# Patient Record
Sex: MALE | Race: WHITE | Employment: OTHER | URBAN - METROPOLITAN AREA
[De-identification: names, ages, dates, MRNs, and addresses within clinical notes are randomized per-mention and may not be internally consistent; named-entity substitution may affect disease eponyms.]

---

## 2020-02-14 ENCOUNTER — HOSPITAL ENCOUNTER (OUTPATIENT)
Dept: LAB | Age: 80
Discharge: HOME | End: 2020-02-14
Payer: MEDICARE

## 2020-02-14 ENCOUNTER — OFFICE VISIT (OUTPATIENT)
Dept: FAMILY MEDICINE CLINIC | Age: 80
End: 2020-02-14

## 2020-02-14 VITALS
HEIGHT: 70 IN | TEMPERATURE: 98.7 F | HEART RATE: 76 BPM | WEIGHT: 195 LBS | SYSTOLIC BLOOD PRESSURE: 112 MMHG | RESPIRATION RATE: 16 BRPM | OXYGEN SATURATION: 96 % | DIASTOLIC BLOOD PRESSURE: 70 MMHG | BODY MASS INDEX: 27.92 KG/M2

## 2020-02-14 DIAGNOSIS — D33.3 ACOUSTIC NEUROMA (HCC): Primary | ICD-10-CM

## 2020-02-14 DIAGNOSIS — Z99.89 OSA ON CPAP: ICD-10-CM

## 2020-02-14 DIAGNOSIS — E78.00 HYPERCHOLESTEREMIA: ICD-10-CM

## 2020-02-14 DIAGNOSIS — G47.33 OSA ON CPAP: ICD-10-CM

## 2020-02-14 DIAGNOSIS — R06.09 CHRONIC DYSPNEA: ICD-10-CM

## 2020-02-14 DIAGNOSIS — R01.1 HEART MURMUR: ICD-10-CM

## 2020-02-14 DIAGNOSIS — I82.5Z1 CHRONIC DEEP VEIN THROMBOSIS (DVT) OF DISTAL VEIN OF RIGHT LOWER EXTREMITY (HCC): ICD-10-CM

## 2020-02-14 PROBLEM — F41.9 ANXIETY: Status: ACTIVE | Noted: 2020-02-14

## 2020-02-14 PROBLEM — I82.509 CHRONIC DEEP VEIN THROMBOSIS (DVT) OF LOWER EXTREMITY (HCC): Status: ACTIVE | Noted: 2020-02-14

## 2020-02-14 PROBLEM — I47.9 PAROXYSMAL TACHYCARDIA, UNSPECIFIED (HCC): Status: ACTIVE | Noted: 2020-02-14

## 2020-02-14 PROCEDURE — 85610 PROTHROMBIN TIME: CPT

## 2020-02-14 PROCEDURE — 80053 COMPREHEN METABOLIC PANEL: CPT

## 2020-02-14 PROCEDURE — 85025 COMPLETE CBC W/AUTO DIFF WBC: CPT

## 2020-02-14 RX ORDER — FOLIC ACID 1 MG/1
TABLET ORAL DAILY
COMMUNITY

## 2020-02-14 RX ORDER — ATORVASTATIN CALCIUM 20 MG/1
TABLET, FILM COATED ORAL DAILY
COMMUNITY
End: 2020-06-17

## 2020-02-14 RX ORDER — ASCORBIC ACID 500 MG
TABLET ORAL
COMMUNITY

## 2020-02-14 RX ORDER — LANOLIN ALCOHOL/MO/W.PET/CERES
CREAM (GRAM) TOPICAL DAILY
COMMUNITY
End: 2021-06-15

## 2020-02-14 RX ORDER — ASPIRIN 81 MG/1
TABLET ORAL DAILY
COMMUNITY
End: 2021-01-19

## 2020-02-14 RX ORDER — ACETAMINOPHEN 500 MG
2000 TABLET ORAL DAILY
COMMUNITY

## 2020-02-14 RX ORDER — SOTALOL HYDROCHLORIDE 80 MG/1
80 TABLET ORAL 2 TIMES DAILY
COMMUNITY
End: 2020-03-04 | Stop reason: SDUPTHER

## 2020-02-14 RX ORDER — WARFARIN 1 MG/1
1 TABLET ORAL DAILY
COMMUNITY
End: 2020-05-14 | Stop reason: DRUGHIGH

## 2020-02-14 RX ORDER — WARFARIN 2.5 MG/1
2.5 TABLET ORAL DAILY
COMMUNITY
End: 2020-02-27 | Stop reason: SDUPTHER

## 2020-02-14 RX ORDER — IRON 18 MG
TABLET ORAL
COMMUNITY
End: 2021-02-09

## 2020-02-14 RX ORDER — MENTHOL
1000 GEL (GRAM) TOPICAL DAILY
COMMUNITY
End: 2021-02-09

## 2020-02-14 RX ORDER — ZINC GLUCONATE 10 MG
LOZENGE ORAL
COMMUNITY

## 2020-02-14 RX ORDER — PAROXETINE HYDROCHLORIDE 20 MG/1
TABLET, FILM COATED ORAL EVERY EVENING
COMMUNITY
End: 2020-05-10 | Stop reason: SDUPTHER

## 2020-02-14 NOTE — PROGRESS NOTES
Chief Complaint   Patient presents with   [facility] Patient

## 2020-02-14 NOTE — PROGRESS NOTES
Agueda Eli  70 y.o. male  1940  [address] [facility]  [id number]     [facility] PRACTICE       Encounter Date: 2/14/2020           New Patient Visit Note: Fermin Goff MD      Reason for Appointment:  Chief Complaint   Patient presents with   [facility] Establish Care     New Patient        History of Present Illness:  History provided by patient    Agueda Eli is a  70y.o. year old male who presents to clinic today for an new patient visit. The patient  has a past medical history of Acoustic neuroma ([address] (2/18/2020), Chronic deep vein thrombosis (DVT) of lower extremity ([address] (2/14/2020), Hypercholesteremia (2/14/2020), ISABELLA on CPAP (2/18/2020), and Paroxysmal tachycardia, unspecified ([facility] (2/14/2020). .  The patient reports that he has been feeling well recently, and he denies any recent illnesses or hospitalizations. The patient's acute and chronic medical conditions that were addressed today are discussed in greater detail below.         Acoustic neuroma [facility])     Duration: diagnosed around age 42  Monitoring: patient reports getting imaging every 2 years  Specialist: has ENT in [facility], but would like to transfer care to [facility], [address] [place] [address] ISABELLA on CPAP:   -reports good compliance and would like to establish with sleep medicine in [place]      Chronic dyspnea     Duration: patient reports that he has had chronic dyspnea since he was in his twenties  Evaluation: reports that he was found to have left diaphragmatic issues  Specialist: reports seeing a pulmonologist several years ago, but denies any recent visits      Chronic deep vein thrombosis (DVT) of lower extremity ([address]     Duration: age 24 with recurrence at age 48 (started on coumadin by Dr. Elizabeth De La Cruz)  Location: right lower leg with chronic swelling  Treatment: Coumadin with INR goal of 1.8 to 2.5       Paroxysmal tachycardia, unspecified ([address]     Medicine: Sotalol  Cardiology: patient reports that he was followed by Dr. Adriane Nogueira MD, but he would like to transfer care to [address]  Denies CP or Dyspnea. All other ROS were reviewed and are negative except as discussed in HPI      Allergies: Sulfa (sulfonamide antibiotics)    Medications: (Updated to reflect final medication list after visit)    Current Outpatient Medications:     warfarin (COUMADIN) 2.5 mg tablet, Take 2.5 mg by mouth daily. , Disp: , Rfl:     warfarin (COUMADIN) 1 mg tablet, Take 1 mg by mouth daily. , Disp: , Rfl:     aspirin delayed-release 81 mg tablet, Take  by mouth daily. , Disp: , Rfl:     PARoxetine (PAXIL) 20 mg tablet, Take  by mouth every evening., Disp: , Rfl:     sotalol (BETAPACE) 80 mg tablet, Take 80 mg by mouth two (2) times a day., Disp: , Rfl:     atorvastatin (LIPITOR) 20 mg tablet, Take  by mouth daily. , Disp: , Rfl:     iron 18 mg tab, Take  by mouth., Disp: , Rfl:     ascorbic acid, vitamin C, (VITAMIN C) 500 mg tablet, Take  by mouth., Disp: , Rfl:     gluc case/chondro case A/vit C/Mn (GLUCOSAMINE 1500 COMPLEX PO), Take  by mouth., Disp: , Rfl:     vitamin e (E GEMS) 1,000 unit capsule, Take 1,000 Units by mouth daily. , Disp: , Rfl:     thiamine HCL (VITAMIN B-1) 100 mg tablet, Take  by mouth daily. , Disp: , Rfl:     folic acid (FOLVITE) 1 mg tablet, Take  by mouth daily. , Disp: , Rfl:     cholecalciferol (VITAMIN D3) (2,000 UNITS /50 MCG) cap capsule, Take  by mouth two (2) times a day., Disp: , Rfl:     magnesium 250 mg tab, Take  by mouth., Disp: , Rfl:     calcium carbonate (CALCIUM 500 PO), Take  by mouth., Disp: , Rfl:     turmeric (CURCUMIN), by Does Not Apply route., Disp: , Rfl:     History  Patient Care Team:  Rad Samaniego MD as PCP - General (Family Practice)    Past Medical History: he has a past medical history of Acoustic neuroma ([facility] [address] (2/18/2020), Chronic deep vein thrombosis (DVT) of lower extremity ([facility] [address] (2/14/2020), Hypercholesteremia (2/14/2020), ISABELLA on CPAP (2/18/2020), and Paroxysmal tachycardia, unspecified ([facility] [address] (2/14/2020). right shoulder replacement     Past Surgical History: he has a past surgical history that includes hx orthopaedic; hx colonoscopy; and hx hernia repair. , nnormal stress test age 81,     Family Medical History: family history is not on file. Social History: he reports that he has quit smoking. His smoking use included pipe. He has never used smokeless tobacco. He reports current alcohol use. He reports that he does not use drugs. He grew up in [place], moved to [place] for work, but recently moved to [place] to be closer to granddaughters. His interests include sailing, fishing, family (his daughter is at [employer] running advancement program)      Objective:   Visit Vitals  /70 (BP 1 Location: Left arm, BP Patient Position: Sitting)   Pulse 76   Temp 98.7 °F (37.1 °C) (Oral)   Resp 16   Ht 5' 9.69\" (1.77 m)   Wt 195 lb (88.5 kg)   SpO2 96%   BMI 28.23 kg/m²     Wt Readings from Last 3 Encounters:   02/14/20 195 lb (88.5 kg)       Physical Exam  HENT:      Head: Normocephalic and atraumatic. Right Ear: External ear normal.      Left Ear: External ear normal.      Nose: Nose normal.      Mouth/Throat:      Mouth: Mucous membranes are moist.      Pharynx: No oropharyngeal exudate. Eyes:      General: Lids are normal. No scleral icterus. Right eye: No discharge. Left eye: No discharge. Extraocular Movements: Extraocular movements intact. Conjunctiva/sclera: Conjunctivae normal.      Pupils: Pupils are equal, round, and reactive to light. Neck:      Musculoskeletal: Normal range of motion and neck supple. Thyroid: No thyromegaly. Vascular: No JVD. Trachea: No tracheal deviation. Cardiovascular:      Rate and Rhythm: Normal rate and regular rhythm. Pulses:           Radial pulses are 2+ on the right side and 2+ on the left side. Heart sounds: Murmur present.  No friction rub. No gallop. Pulmonary:      Effort: Pulmonary effort is normal. No respiratory distress. Breath sounds: Normal breath sounds. No stridor. No wheezing. Abdominal:      General: Bowel sounds are normal. There is no distension. Palpations: Abdomen is soft. There is no mass. Tenderness: There is no abdominal tenderness. There is no guarding or rebound. Musculoskeletal: Normal range of motion. General: No tenderness or deformity. Right lower leg: No edema. Left lower leg: No edema. Lymphadenopathy:      Cervical: No cervical adenopathy. Skin:     General: Skin is warm and dry. Neurological:      Mental Status: He is alert. Cranial Nerves: No cranial nerve deficit. Coordination: Coordination normal.      Gait: Gait is intact. Deep Tendon Reflexes: Reflexes normal.   Psychiatric:         Mood and Affect: Mood normal.         Behavior: Behavior normal.         Thought Content: Thought content normal.              Assessment & Plan:    Diagnoses and all orders for this visit:    1. Acoustic neuroma [facility])  Assessment & Plan:  Chronic, stable  - will provide referral to ENT to establish care for continued monitoring  - discussed red flag symptoms and reasons to call or go to ED    Orders:  -     REFERRAL TO ENT-OTOLARYNGOLOGY    2. ISABELLA on CPAP  -     REFERRAL TO SLEEP STUDIES    3. Chronic deep vein thrombosis (DVT) of distal vein of right lower extremity (HCC)  Assessment & Plan:  Chronic, stable   - check INR for coumadin dosing  - discussed precautions with coumadin      Orders:  -     PROTHROMBIN TIME + INR  -     CBC WITH AUTOMATED DIFF    4. Hypercholesteremia  Assessment & Plan:  Chronic, stable  - check Lipids  - continue Lipitor    Orders:  -     METABOLIC PANEL, COMPREHENSIVE    5.  Chronic dyspnea  Assessment & Plan:  Chronic, unclear history  - obtain prior records for further information  - discussed red flag symptoms and reasons to call or go to ED      6. Heart murmur  Assessment & Plan:  Chronic, stable  - obtain records from previous provider  - discussed red flag symptoms and reasons to call or go to ED        I have discussed the diagnosis with the patient and the intended plan as seen in the above orders. The patient has received an after-visit summary along with patient information handout. I have discussed medication side effects and warnings with the patient as well. Dispostion  Follow-up and Dispositions    · Return in about 4 weeks (around 3/13/2020).            Joann Kern MD

## 2020-02-15 LAB
ALBUMIN SERPL-MCNC: 4.5 G/DL (ref 3.7–4.7)
ALBUMIN/GLOB SERPL: 2 {RATIO} (ref 1.2–2.2)
ALP SERPL-CCNC: 63 IU/L (ref 39–117)
ALT SERPL-CCNC: 19 IU/L (ref 0–44)
AST SERPL-CCNC: 19 IU/L (ref 0–40)
BASOPHILS # BLD AUTO: 0 X10E3/UL (ref 0–0.2)
BASOPHILS NFR BLD AUTO: 0 %
BILIRUB SERPL-MCNC: 0.3 MG/DL (ref 0–1.2)
BUN SERPL-MCNC: 14 MG/DL (ref 8–27)
BUN/CREAT SERPL: 17 (ref 10–24)
CALCIUM SERPL-MCNC: 9.3 MG/DL (ref 8.6–10.2)
CHLORIDE SERPL-SCNC: 103 MMOL/L (ref 96–106)
CO2 SERPL-SCNC: 23 MMOL/L (ref 20–29)
CREAT SERPL-MCNC: 0.82 MG/DL (ref 0.76–1.27)
EOSINOPHIL # BLD AUTO: 0.1 X10E3/UL (ref 0–0.4)
EOSINOPHIL NFR BLD AUTO: 2 %
ERYTHROCYTE [DISTWIDTH] IN BLOOD BY AUTOMATED COUNT: 12.1 % (ref 11.6–15.4)
GLOBULIN SER CALC-MCNC: 2.3 G/DL (ref 1.5–4.5)
GLUCOSE SERPL-MCNC: 100 MG/DL (ref 65–99)
HCT VFR BLD AUTO: 40.7 % (ref 37.5–51)
HGB BLD-MCNC: 13.4 G/DL (ref 13–17.7)
IMM GRANULOCYTES # BLD AUTO: 0 X10E3/UL (ref 0–0.1)
IMM GRANULOCYTES NFR BLD AUTO: 0 %
INR PPP: 2.4 (ref 0.8–1.2)
LYMPHOCYTES # BLD AUTO: 1.4 X10E3/UL (ref 0.7–3.1)
LYMPHOCYTES NFR BLD AUTO: 24 %
MCH RBC QN AUTO: 32.8 PG (ref 26.6–33)
MCHC RBC AUTO-ENTMCNC: 32.9 G/DL (ref 31.5–35.7)
MCV RBC AUTO: 100 FL (ref 79–97)
MONOCYTES # BLD AUTO: 0.8 X10E3/UL (ref 0.1–0.9)
MONOCYTES NFR BLD AUTO: 14 %
NEUTROPHILS # BLD AUTO: 3.4 X10E3/UL (ref 1.4–7)
NEUTROPHILS NFR BLD AUTO: 60 %
PLATELET # BLD AUTO: 199 X10E3/UL (ref 150–450)
POTASSIUM SERPL-SCNC: 5.5 MMOL/L (ref 3.5–5.2)
PROT SERPL-MCNC: 6.8 G/DL (ref 6–8.5)
PROTHROMBIN TIME: 22.9 SEC (ref 9.1–12)
RBC # BLD AUTO: 4.09 X10E6/UL (ref 4.14–5.8)
SODIUM SERPL-SCNC: 143 MMOL/L (ref 134–144)
WBC # BLD AUTO: 5.7 X10E3/UL (ref 3.4–10.8)

## 2020-02-18 PROBLEM — G47.33 OSA ON CPAP: Status: ACTIVE | Noted: 2020-02-18

## 2020-02-18 PROBLEM — R01.1 HEART MURMUR: Status: ACTIVE | Noted: 2020-02-18

## 2020-02-18 PROBLEM — R06.09 CHRONIC DYSPNEA: Status: ACTIVE | Noted: 2020-02-18

## 2020-02-18 PROBLEM — D33.3 ACOUSTIC NEUROMA (HCC): Status: ACTIVE | Noted: 2020-02-18

## 2020-02-18 PROBLEM — Z99.89 OSA ON CPAP: Status: ACTIVE | Noted: 2020-02-18

## 2020-02-19 NOTE — ASSESSMENT & PLAN NOTE
Chronic, stable  - obtain records from previous provider  - discussed red flag symptoms and reasons to call or go to ED

## 2020-02-19 NOTE — ASSESSMENT & PLAN NOTE
Chronic, stable  - will provide referral to ENT to establish care for continued monitoring  - discussed red flag symptoms and reasons to call or go to ED

## 2020-02-19 NOTE — ASSESSMENT & PLAN NOTE
Chronic, unclear history  - obtain prior records for further information  - discussed red flag symptoms and reasons to call or go to ED

## 2020-02-19 NOTE — ASSESSMENT & PLAN NOTE
Key CAD CHF Meds             warfarin (COUMADIN) 2.5 mg tablet (Taking) Take 2.5 mg by mouth daily. warfarin (COUMADIN) 1 mg tablet (Taking) Take 1 mg by mouth daily. aspirin delayed-release 81 mg tablet (Taking) Take  by mouth daily. sotalol (BETAPACE) 80 mg tablet (Taking) Take 80 mg by mouth two (2) times a day. atorvastatin (LIPITOR) 20 mg tablet (Taking) Take  by mouth daily.         Lab Results   Component Value Date/Time    Sodium 143 02/14/2020 04:50 PM    Potassium 5.5 02/14/2020 04:50 PM    INR 2.4 02/14/2020 04:50 PM    Prothrombin time 22.9 02/14/2020 04:50 PM

## 2020-03-04 NOTE — TELEPHONE ENCOUNTER
.Pharmacy is requesting for a new medication. .  Requested Prescriptions     Pending Prescriptions Disp Refills    sotalol (BETAPACE) 80 mg tablet       Sig: Take 1 Tab by mouth two (2) times a day. [facility] on file verified        LOV: Friday, February 14, 2020  UOV: Friday, March 13, 2020

## 2020-03-05 ENCOUNTER — TELEPHONE (OUTPATIENT)
Dept: FAMILY MEDICINE CLINIC | Age: 80
End: 2020-03-05

## 2020-03-05 DIAGNOSIS — E87.5 SERUM POTASSIUM ELEVATED: Primary | ICD-10-CM

## 2020-03-05 RX ORDER — SOTALOL HYDROCHLORIDE 80 MG/1
80 TABLET ORAL 2 TIMES DAILY
Qty: 180 TAB | Refills: 1 | Status: SHIPPED | OUTPATIENT
Start: 2020-03-05 | End: 2020-06-05 | Stop reason: SDUPTHER

## 2020-03-05 NOTE — TELEPHONE ENCOUNTER
Called patient and discussed lab results. INR 2.4. Patient reports his goal at 1.8 to 2.5. Potassium slightly elevated. Will recheck. Patient expresses agreement.      Lucinda Barr MD

## 2020-03-12 ENCOUNTER — HOSPITAL ENCOUNTER (OUTPATIENT)
Dept: LAB | Age: 80
Discharge: HOME | End: 2020-03-12
Payer: MEDICARE

## 2020-03-12 PROCEDURE — 80048 BASIC METABOLIC PNL TOTAL CA: CPT

## 2020-03-12 PROCEDURE — 36415 COLL VENOUS BLD VENIPUNCTURE: CPT

## 2020-03-13 LAB
BUN SERPL-MCNC: 17 MG/DL (ref 8–27)
BUN/CREAT SERPL: 20 (ref 10–24)
CALCIUM SERPL-MCNC: 9.2 MG/DL (ref 8.6–10.2)
CHLORIDE SERPL-SCNC: 103 MMOL/L (ref 96–106)
CO2 SERPL-SCNC: 25 MMOL/L (ref 20–29)
CREAT SERPL-MCNC: 0.85 MG/DL (ref 0.76–1.27)
GLUCOSE SERPL-MCNC: 94 MG/DL (ref 65–99)
POTASSIUM SERPL-SCNC: 5.4 MMOL/L (ref 3.5–5.2)
SODIUM SERPL-SCNC: 141 MMOL/L (ref 134–144)

## 2020-03-16 ENCOUNTER — TELEPHONE (OUTPATIENT)
Dept: FAMILY MEDICINE CLINIC | Age: 80
End: 2020-03-16

## 2020-03-16 DIAGNOSIS — I82.5Z1 CHRONIC DEEP VEIN THROMBOSIS (DVT) OF DISTAL VEIN OF RIGHT LOWER EXTREMITY (HCC): Primary | ICD-10-CM

## 2020-03-16 NOTE — TELEPHONE ENCOUNTER
----- Message from [employer] [place] sent at 3/16/2020 12:19 PM EDT -----  Regarding: Dr. Geoff Palmer first and last name: Oneyda Yi  Reason for call:   Pt is following up about a phone call appt that he was supposed to have with Dr. Cassie Matthew today at 10:45. The  told the nurse that it was advised for him to expect a call from the dr and have the appt there then to have the appt at the practice due to the age.  I attempted two transfers over to the backline for the pt to have the appt  Callback required yes/no and why:yes  Best contact number(s):  [phone]  Details to clarify the request:

## 2020-03-16 NOTE — TELEPHONE ENCOUNTER
03/16/20  5:22 PM    Called patient regarding rescheduled apt. Discussed ISABELLA and recommended that he follow up with sleep medicine. Discussed chronic DVT. Will place order for recheck of INR, and patient will have this performed when he is able.      1. Chronic deep vein thrombosis (DVT) of distal vein of right lower extremity ([address]  - PROTHROMBIN TIME + INR    Collin De La Vega MD

## 2020-03-16 NOTE — TELEPHONE ENCOUNTER
----- Message from [employer] sent at 3/16/2020  3:15 PM EDT -----  Regarding: Dr. Rod Guzman Message/Vendor Calls    Caller's first and last name: Kaden Barker      Reason for call: unknown      Callback required yes/no and why:yes      Best contact number(s):[phone]    Details to clarify the request: Patient is returning your call please call back      Hema Adam

## 2020-03-24 ENCOUNTER — TELEPHONE (OUTPATIENT)
Dept: FAMILY MEDICINE CLINIC | Age: 80
End: 2020-03-24

## 2020-03-24 DIAGNOSIS — E87.5 HYPERKALEMIA: Primary | ICD-10-CM

## 2020-03-24 NOTE — TELEPHONE ENCOUNTER
Called patient and discussed lab results with elevated potassium that is stable but remains elevated from last check. Patient denies any CP, dyspnea, sob, or palpitations. Will plan to recheck in two weeks. Patient expresses agreement.

## 2020-03-27 VITALS — WEIGHT: 190 LBS | HEIGHT: 72 IN | BODY MASS INDEX: 25.73 KG/M2

## 2020-03-30 ENCOUNTER — OFFICE VISIT (OUTPATIENT)
Dept: SLEEP MEDICINE | Age: 80
End: 2020-03-30

## 2020-03-30 DIAGNOSIS — G47.33 OSA (OBSTRUCTIVE SLEEP APNEA): Primary | ICD-10-CM

## 2020-03-30 NOTE — PATIENT INSTRUCTIONS

## 2020-03-30 NOTE — PROGRESS NOTES
[address] [facility] [place]. [place], [address]  Tel.  [phone]  Fax. [address] [facility] [address]  [place], [address] [facility]  Tel.  [phone]  Fax. [phone] [address] [facility] [place]   Tel.  [phone]  Fax. [phone]       Portillo Wolf is a 70 y.o. male who was seen by synchronous (real-time) audio-video technology on 3/30/2020. Consent:  He and/or his healthcare decision maker is aware that this patient-initiated Telehealth encounter is a billable service, with coverage as determined by his insurance carrier. He is aware that he may receive a bill and has provided verbal consent to proceed: Yes    I was in the office while conducting this encounter. Chief Complaint       Chief Complaint   Patient presents with    Sleep Problem     NO refd by Sonny Pires for sleep consult       HPI      Portillo Wolf is 70 y.o. male seen for evaluation of a sleep disorder. He notes having had an initial evaluation in [place] over 10 years ago (Dr. Shayna Cherry, ENT) for which she was started on CPAP. He moved to [place] last December. His current unit is 3or 11years old. He has not had supplies since August 2019. Currently, he retires between 10-10: 30 p.m. and awakens at 7 AM.  He may awaken once during the night. He notes a history of snoring and associated apnea. He denies vivid dreaming or nightmares, sleep talking or sleepwalking, bruxism or nocturnal incontinence, abnormal arm or leg movements, hypnagogic hallucinations, sleep paralysis or cataplexy. He notes that he may doze if he is seated and inactive such as reading, watching TV, riding as a passenger or at the movies. [place] Sleepiness Scale is elevated at 13. Compliance data demonstrated that during the past 30 days, APAP of 4-18 cm use during 30 days with the average daily use of 8.15 hours. CMS compliance criteria 100%.   Mask leak is frequently elevated. Average AHI 6.6/h. [place] Sleepiness Score: 13       Allergies   Allergen Reactions    Sulfa (Sulfonamide Antibiotics) Rash       Current Outpatient Medications   Medication Sig Dispense Refill    sotaloL (BETAPACE) 80 mg tablet Take 1 Tab by mouth two (2) times a day. 180 Tab 1    warfarin (COUMADIN) 2.5 mg tablet Take 1 Tab by mouth daily. 90 Tab 0    warfarin (COUMADIN) 1 mg tablet Take 1 mg by mouth daily.  aspirin delayed-release 81 mg tablet Take  by mouth daily.  PARoxetine (PAXIL) 20 mg tablet Take  by mouth every evening.  atorvastatin (LIPITOR) 20 mg tablet Take  by mouth daily.  iron 18 mg tab Take  by mouth.  ascorbic acid, vitamin C, (VITAMIN C) 500 mg tablet Take  by mouth.  gluc case/chondro case A/vit C/Mn (GLUCOSAMINE 1500 COMPLEX PO) Take  by mouth.  vitamin e (E GEMS) 1,000 unit capsule Take 1,000 Units by mouth daily.  thiamine HCL (VITAMIN B-1) 100 mg tablet Take  by mouth daily.  folic acid (FOLVITE) 1 mg tablet Take  by mouth daily.  cholecalciferol (VITAMIN D3) (2,000 UNITS /50 MCG) cap capsule Take  by mouth two (2) times a day.  magnesium 250 mg tab Take  by mouth.  calcium carbonate (CALCIUM 500 PO) Take  by mouth.  turmeric (CURCUMIN) by Does Not Apply route. He  has a past medical history of Acoustic neuroma ([facility] (2/18/2020), Chronic deep vein thrombosis (DVT) of lower extremity ([facility] [address] (2/14/2020), Hypercholesteremia (2/14/2020), SIABELLA on CPAP (2/18/2020), and Paroxysmal tachycardia, unspecified ([facility] (2/14/2020). He  has a past surgical history that includes hx orthopaedic; hx colonoscopy; and hx hernia repair. He family history is not on file. He  reports that he has quit smoking. His smoking use included pipe. He has never used smokeless tobacco. He reports current alcohol use. He reports that he does not use drugs.      Review of Systems:  Review of Systems   Constitutional: Negative for chills and fever. HENT: Positive for tinnitus. Negative for hearing loss. Eyes: Negative for blurred vision. Respiratory: Negative for cough and shortness of breath. Cardiovascular: Negative for chest pain and palpitations. Gastrointestinal: Negative for heartburn and nausea. Genitourinary: Positive for urgency. Negative for frequency. Musculoskeletal: Negative for back pain and neck pain. Skin: Negative for itching and rash. Neurological: Negative for dizziness and headaches. Psychiatric/Behavioral: Negative for depression. Objective:     Visit Vitals  Ht 6' (1.829 m)   Wt 190 lb (86.2 kg)   BMI 25.77 kg/m²     Body mass index is 25.77 kg/m². General:   Conversant, cooperative                               Neuro:  Speech fluent,   Psych:  Normal affect,  normal countenance        Assessment:       ICD-10-CM ICD-9-CM    1. ISABELLA (obstructive sleep apnea) G47.33 327.23        History of sleep apnea. Elevated AHI potentially reflecting elevated mask leak. A copy of his initial evaluation will be requested. Once obtained he should be able to upgrade his supplies. Compliance review after 2 weeks with new supplies. Plan:     No orders of the defined types were placed in this encounter. * Patient has a history and examination consistent with the diagnosis of sleep apnea. *Original sleep testing will be obtained  * He was provided information on sleep apnea including corresponding risk factors and the importance of proper treatment. * Treatment options if indicated were reviewed today. Instructions:  o Do not engage in activities requiring a normal degree of alertness if fatigue is present.   o The patient understands that untreated or undertreated sleep apnea could impair judgement and the ability to function normally during the day.  o Call or return if symptoms worsen or persist.          Lashae Hager MD, FAASM  Electronically signed 03/30/20     Pursuant to the emergency declaration under the [facility] waiver authority and the [employer] and [employer] Act, this Virtual  Visit was conducted, with patient's consent, to reduce the patient's risk of exposure to COVID-19 and provide continuity of care for an established patient. Services were provided through a video synchronous discussion virtually to substitute for in-person clinic visit. Vida Sagasutme MD       This note was created using voice recognition software. Despite editing, there may be syntax errors. This note will not be viewable in [address]

## 2020-04-03 ENCOUNTER — DOCUMENTATION ONLY (OUTPATIENT)
Dept: SLEEP MEDICINE | Age: 80
End: 2020-04-03

## 2020-04-20 ENCOUNTER — HOSPITAL ENCOUNTER (OUTPATIENT)
Dept: LAB | Age: 80
Discharge: HOME | End: 2020-04-20
Payer: MEDICARE

## 2020-04-20 PROCEDURE — 85610 PROTHROMBIN TIME: CPT

## 2020-04-20 PROCEDURE — 36415 COLL VENOUS BLD VENIPUNCTURE: CPT

## 2020-04-21 LAB
INR PPP: 2.3 (ref 0.8–1.2)
PROTHROMBIN TIME: 23.3 SEC (ref 9.1–12)

## 2020-05-10 ENCOUNTER — TELEPHONE (OUTPATIENT)
Dept: FAMILY MEDICINE CLINIC | Age: 80
End: 2020-05-10

## 2020-05-10 DIAGNOSIS — F41.9 ANXIETY: Primary | ICD-10-CM

## 2020-05-10 RX ORDER — PAROXETINE HYDROCHLORIDE 20 MG/1
40 TABLET, FILM COATED ORAL EVERY EVENING
Qty: 90 TAB | Refills: 1 | Status: SHIPPED | OUTPATIENT
Start: 2020-05-10 | End: 2020-08-18 | Stop reason: SDUPTHER

## 2020-05-14 ENCOUNTER — ANTI-COAG VISIT (OUTPATIENT)
Dept: CARDIOLOGY CLINIC | Age: 80
End: 2020-05-14

## 2020-05-14 DIAGNOSIS — I82.5Z1 CHRONIC DEEP VEIN THROMBOSIS (DVT) OF DISTAL VEIN OF RIGHT LOWER EXTREMITY (HCC): Primary | ICD-10-CM

## 2020-05-14 DIAGNOSIS — Z79.01 CURRENT USE OF LONG TERM ANTICOAGULATION: ICD-10-CM

## 2020-05-14 LAB
INR BLD: 2.4
PT POC: NORMAL
VALID INTERNAL CONTROL?: YES

## 2020-05-14 RX ORDER — WARFARIN 1 MG/1
1 TABLET ORAL
COMMUNITY
End: 2020-06-02 | Stop reason: SDUPTHER

## 2020-05-14 RX ORDER — WARFARIN 2.5 MG/1
2.5 TABLET ORAL
COMMUNITY
End: 2020-06-02 | Stop reason: SDUPTHER

## 2020-05-14 NOTE — PROGRESS NOTES
A full discussion of the nature of anticoagulants has been carried out. A benefit risk analysis has been presented to the patient, so that they understand the justification for choosing anticoagulation at this time. The need for frequent and regular monitoring, precise dosage adjustment and compliance is stressed. Side effects of potential bleeding are discussed. The patient should avoid any OTC items containing aspirin or ibuprofen, and should avoid great swings in general diet. Avoid alcohol consumption. Call if any signs of abnormal bleeding. Next PT/INR test in 1 month.     Continue current dosing regimen  Patient verbalized understanding    Anticoagulation Summary  As of 2020    INR goal:   2.0-3.0   TTR:      INR used for dosin.4 (2020)   Warfarin maintenance plan:   2.5 mg (2.5 mg x 1) every Mon, Wed, Fri; 3.5 mg (2.5 mg x 1 and 1 mg x 1) all other days   Weekly warfarin total:   21.5 mg   Plan last modified:   Annie Rice NP (2020)   Next INR check:   2020   Target end date:       Indications    Chronic deep vein thrombosis (DVT) of lower extremity ([facility] [address] [address] [I82.509]             Anticoagulation Episode Summary     INR check location:       Preferred lab:       Send INR reminders to:       Comments:         Anticoagulation Care Providers     Provider Role Specialty Phone number    Vamsi Moore MD [facility] [phone]          Future Appointments   Date Time Provider Corinna Johnston   6/3/2020  3:30 PM Vamsi Moore  [facility]

## 2020-05-21 ENCOUNTER — VIRTUAL VISIT (OUTPATIENT)
Dept: FAMILY MEDICINE CLINIC | Age: 80
End: 2020-05-21

## 2020-05-21 VITALS — HEIGHT: 72 IN | BODY MASS INDEX: 25.77 KG/M2

## 2020-05-21 DIAGNOSIS — I82.5Z1 CHRONIC DEEP VEIN THROMBOSIS (DVT) OF DISTAL VEIN OF RIGHT LOWER EXTREMITY (HCC): ICD-10-CM

## 2020-05-21 DIAGNOSIS — Z99.89 OSA ON CPAP: ICD-10-CM

## 2020-05-21 DIAGNOSIS — G47.33 OSA ON CPAP: ICD-10-CM

## 2020-05-21 DIAGNOSIS — F41.9 ANXIETY: Primary | ICD-10-CM

## 2020-05-21 DIAGNOSIS — D33.3 ACOUSTIC NEUROMA (HCC): ICD-10-CM

## 2020-05-21 DIAGNOSIS — Z86.006 HX OF MELANOMA IN SITU: ICD-10-CM

## 2020-05-21 NOTE — PROGRESS NOTES
SUBJECTIVE: The patient {gen pregnancy complaints obgyn:[id number]::\"has no unusual complaints\"} 1. Have you been to the ER, urgent care clinic since your last visit? Hospitalized since your last visit? No 
 
2. Have you seen or consulted any other health care providers outside of the [address] [place] [address] since your last visit? Include any pap smears or colon screening.  No

## 2020-05-21 NOTE — PROGRESS NOTES
Bethany Simmons  70 y.o. male  1940  [place] [address]  [id number]     1101        Encounter Date: 5/21/2020           Established Patient Visit Note: Zaki Rivera MD    Reason for Appointment:  Chief Complaint   Patient presents with    Medication Evaluation       History of Present Illness:  History provided by patient    Bethany Simmons is a 70 y.o. male who presents today for follow up of his chronic conditions. Anxiety: patient taking Paxil 20mg daily; recently increased dosing to 40mg daily given current situational stress with Pandemic. He reports that he is doing well with this dosing and he denies any SI  ISABELLA: Using CPAP and following along with sleep medicine  Chronic DVT: Followed by anticoagulation clinic; last visit on 5/14/20 with INR of 2.4  Hx of Skin Cancer: Patient endorses having hx of melanoma in situ that were excised. He also endorses having SSC that was removed. He would like referral to dermatology. 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 Respiratory: Negative for cough, shortness of breath and wheezing. Cardiovascular: Negative for chest pain and palpitations. Allergies: Sulfa (sulfonamide antibiotics)    Medications: (Updated to reflect final medication list after visit)    Current Outpatient Medications:     warfarin (Coumadin) 2.5 mg tablet, Take 2.5 mg by mouth three (3) days a week., Disp: , Rfl:     warfarin (COUMADIN) 1 mg tablet, Take 1 mg by mouth four (4) days a week.  In addition to 2.5mg tablet, Disp: , Rfl:     PARoxetine (PaxiL) 20 mg tablet, Take 2 Tabs by mouth every evening., Disp: 90 Tab, Rfl: 1    sotaloL (BETAPACE) 80 mg tablet, Take 1 Tab by mouth two (2) times a day., Disp: 180 Tab, Rfl: 1    aspirin delayed-release 81 mg tablet, Take by mouth daily. , Disp: , Rfl:     atorvastatin (LIPITOR) 20 mg tablet, Take  by mouth daily. , Disp: , Rfl:     iron 18 mg tab, Take  by mouth., Disp: , Rfl:     ascorbic acid, vitamin C, (VITAMIN C) 500 mg tablet, Take  by mouth., Disp: , Rfl:     gluc case/chondro case A/vit C/Mn (GLUCOSAMINE 1500 COMPLEX PO), Take  by mouth., Disp: , Rfl:     vitamin e (E GEMS) 1,000 unit capsule, Take 1,000 Units by mouth daily. , Disp: , Rfl:     thiamine HCL (VITAMIN B-1) 100 mg tablet, Take  by mouth daily. , Disp: , Rfl:     folic acid (FOLVITE) 1 mg tablet, Take  by mouth daily. , Disp: , Rfl:     cholecalciferol (VITAMIN D3) (2,000 UNITS /50 MCG) cap capsule, Take  by mouth two (2) times a day., Disp: , Rfl:     magnesium 250 mg tab, Take  by mouth., Disp: , Rfl:     calcium carbonate (CALCIUM 500 PO), Take  by mouth., Disp: , Rfl:     turmeric (CURCUMIN), by Does Not Apply route., Disp: , Rfl:     History  Patient Care Team:  Irwin Payan MD as PCP - General (Family Practice)  Irwin Payan MD as PCP - [facility]    Past Medical History: he has a past medical history of Acoustic neuroma ([facility] (2/18/2020), Chronic deep vein thrombosis (DVT) of lower extremity ([facility] (2/14/2020), Hypercholesteremia (2/14/2020), ISABELLA on CPAP (2/18/2020), and Paroxysmal tachycardia, unspecified ([facility] (2/14/2020). Past Surgical History: he has a past surgical history that includes hx orthopaedic; hx colonoscopy; and hx hernia repair. Family Medical History: family history is not on file. Social History: he reports that he has quit smoking. His smoking use included pipe. He has never used smokeless tobacco. He reports current alcohol use. He reports that he does not use drugs.       Objective:   Vital Signs  Visit Vitals  Ht 6' (1.829 m)   BMI 25.77 kg/m²     Unable to obtain full set of vital signs today as patient does not have all equipment for this at home    Physical Exam  Constitutional:       General: He is not in acute distress. Appearance: Normal appearance. He is not ill-appearing or toxic-appearing. HENT:      Head: Normocephalic. Right Ear: External ear normal.      Left Ear: External ear normal.      Nose: Nose normal.      Mouth/Throat:      Mouth: Mucous membranes are moist.   Eyes:      General: No scleral icterus. Right eye: No discharge. Left eye: No discharge. Extraocular Movements: Extraocular movements intact. Conjunctiva/sclera: Conjunctivae normal.   Neck:      Musculoskeletal: Normal range of motion. Pulmonary:      Effort: Pulmonary effort is normal. No respiratory distress. Neurological:      Mental Status: He is alert and oriented to person, place, and time. Mental status is at baseline. Psychiatric:         Mood and Affect: Mood normal.         Behavior: Behavior normal.         Thought Content: Thought content normal.         Judgment: Judgment normal.         Assessment & Plan:      ICD-10-CM ICD-9-CM    1. Anxiety F41.9 300.00    2. Hx of melanoma in situ Z86.006 V10.82 REFERRAL TO DERMATOLOGY   3. ISABELLA on CPAP G47.33 327.23     Z99.89 V46.8    4. Chronic deep vein thrombosis (DVT) of distal vein of right lower extremity (HCC) I82.5Z1 453.52    5. Acoustic neuroma ([facility]) D33.3 225.1      Anxiety: chronic, no SI; recently worse with Pandemic, but improved with increased dosing of Paxil. Will maintain 40mg dosing and reevaluate in 4-6 weeks  Hx of Melanoma: Chronic; will provide referral to dermatology for surveillance  ISABELLA: chronic, improved on CPAP; continue to follow along with sleep medicine  Chronic DVT: chronic stable on Coumadin and followed by anticoagulation clinic  Acoustic Neuroma: chronic, stable; patient has not yet scheduled visit with ENT; provided referral information to patient to schedule appointment      I was in the office while conducting this encounter.     Consent:  He and/or his healthcare decision maker is aware that this patient-initiated Telehealth encounter is a billable service, with coverage as determined by his insurance carrier. He is aware that he may receive a bill and has provided verbal consent to proceed: Yes    This virtual visit was conducted via D[employer] me. Pursuant to the emergency declaration under the [facility], [facility] waiver authority and the [employer] Act, this Virtual  Visit was conducted to reduce the patient's risk of exposure to COVID-19 and provide continuity of care for an established patient. Services were provided through a video synchronous discussion virtually to substitute for in-person clinic visit. Due to this being a TeleHealth evaluation, many elements of the physical examination are unable to be assessed. Total Time: minutes: 21-30 minutes. I have discussed the diagnosis with the patient and the intended plan as seen in the above orders. The patient has received an after-visit summary along with patient information handout. I have discussed medication side effects and warnings with the patient as well. Disposition  Follow-up and Dispositions    · Return in about 4 weeks (around 6/18/2020) for anxiety.            Tyrone Perry MD

## 2020-06-02 ENCOUNTER — TELEPHONE (OUTPATIENT)
Dept: FAMILY MEDICINE CLINIC | Age: 80
End: 2020-06-02

## 2020-06-02 RX ORDER — WARFARIN 2.5 MG/1
TABLET ORAL
Qty: 90 TAB | Refills: 1 | Status: SHIPPED | OUTPATIENT
Start: 2020-06-02 | End: 2020-11-24 | Stop reason: SDUPTHER

## 2020-06-02 RX ORDER — WARFARIN 1 MG/1
TABLET ORAL
Qty: 48 TAB | Refills: 1 | Status: SHIPPED | OUTPATIENT
Start: 2020-06-02 | End: 2020-11-24 | Stop reason: SDUPTHER

## 2020-06-02 NOTE — TELEPHONE ENCOUNTER
MD Jackie Voss is requesting refill for the 2.5mg tablet for patient with sig of 1 tablet once daily. Per last visit, 5/21/20, patient taking 2.5mg 3xweek and 1mg 4xweek. Attached both strengths if appropriate. Thanks, Jagruti Pérez      Last Visit: V.V. 5/21/20  MD Gabriel Castillo  Next Appointment: Not scheduled- to return 4 weeks  Previous Refill Encounter(s):   Historical provider- pharmacy rx 2/27/20 #90  (2.5mg)    Requested Prescriptions     Pending Prescriptions Disp Refills    warfarin (Coumadin) 2.5 mg tablet 36 Tab 1     Sig: Take 1 Tab by mouth three (3) days a week.  warfarin (COUMADIN) 1 mg tablet 48 Tab 1     Sig: Take 1 Tab by mouth four (4) days a week.  In addition to 2.5mg tablet

## 2020-06-03 ENCOUNTER — PATIENT MESSAGE (OUTPATIENT)
Dept: FAMILY MEDICINE CLINIC | Age: 80
End: 2020-06-03

## 2020-06-03 DIAGNOSIS — I82.5Z1 CHRONIC DEEP VEIN THROMBOSIS (DVT) OF DISTAL VEIN OF RIGHT LOWER EXTREMITY (HCC): Primary | ICD-10-CM

## 2020-06-05 RX ORDER — COMPRESS.STOCKING,KNEE,REG,MED
EACH MISCELLANEOUS
Qty: 2 EACH | Refills: 1 | Status: SHIPPED | OUTPATIENT
Start: 2020-06-05 | End: 2020-06-05 | Stop reason: SDUPTHER

## 2020-06-05 RX ORDER — COMPRESS.STOCKING,KNEE,REG,MED
EACH MISCELLANEOUS
Qty: 2 EACH | Refills: 1 | Status: SHIPPED | OUTPATIENT
Start: 2020-06-05 | End: 2020-06-12 | Stop reason: SDUPTHER

## 2020-06-05 RX ORDER — SOTALOL HYDROCHLORIDE 80 MG/1
80 TABLET ORAL 2 TIMES DAILY
Qty: 180 TAB | Refills: 1 | Status: SHIPPED | OUTPATIENT
Start: 2020-06-05 | End: 2021-02-24 | Stop reason: SDUPTHER

## 2020-06-05 NOTE — TELEPHONE ENCOUNTER
----- Message from Patrica Franklin sent at 6/4/2020  9:24 AM EDT -----  Regarding: Dr. Meghann Richmond pharmacy would like to know if a refill for patients Sotalol 80 mg medication can be refilled.  Pls contact pharmacy at [phone]

## 2020-06-05 NOTE — TELEPHONE ENCOUNTER
From: Perez Mota  To: Nicho Vogt MD  Sent: 6/3/2020 4:50 PM EDT  Subject: Prescription Question    Hi Dr. Neha Barrow--    For years, I have worn a Medi elastic stocking on my right leg (the one with DVT history) to improve circulation. The specs are Large, 30-40 mm, open toe calf, model # [id number]. They last me 6-9 months/pair, and my current pair was new last August, so they are getting pretty ratty (I'll save 'em for waqar-fishing). Can you write me a prescription for a new pair and call it in to [address] [place] [address]? Hope all is well at your house. Looking forward to our virtual appointment on Monday afternoon.     Best regards, March [place]

## 2020-06-08 ENCOUNTER — VIRTUAL VISIT (OUTPATIENT)
Dept: FAMILY MEDICINE CLINIC | Age: 80
End: 2020-06-08

## 2020-06-08 DIAGNOSIS — F41.9 ANXIETY: Primary | ICD-10-CM

## 2020-06-08 NOTE — PROGRESS NOTES
Fidelina Hull  70 y.o. male  1940  [facility] [address]  [id number]     [facility]       Encounter Date: 6/8/2020           Established Patient Visit Note: Chemo Santos MD    Reason for Appointment:  Chief Complaint   Patient presents with    Follow-up       History of Present Illness:  History provided by patient    Fidelina Hull is a 70 y.o. male who presents today for follow up of anxiety. The patient was last seen on 5/21/20 for anxiety. His Paxil has been increased from 20 to 40mg for increased situation stress a/w the pandemic. Today, he reports that he is doing well with the increase and most have his symtpoms have now returned to baseline. He denies any SI. He denies any significant side effects from increasing the paxil. Review of Systems  Review of Systems   Constitutional: Negative for chills and fever. Respiratory: Negative for cough, shortness of breath and wheezing. Cardiovascular: Negative for chest pain and palpitations. Allergies: Sulfa (sulfonamide antibiotics)    Medications: (Updated to reflect final medication list after visit)    Current Outpatient Medications:     sotaloL (BETAPACE) 80 mg tablet, Take 1 Tab by mouth two (2) times a day., Disp: 180 Tab, Rfl: 1    Comp. Stocking,Knee,Regular,Lrg (Relief Knee Open Toe Stocking) misc, Use daily for lower extremity swelling, Size large 30-40mm, Model # [id number], Disp: 2 Each, Rfl: 1    warfarin (Coumadin) 2.5 mg tablet, 2.5 mg (2.5 mg x 1) every Mon, Wed, Fri; 3.5 mg (2.5 mg x 1 and 1 mg x 1) all other days, Disp: 90 Tab, Rfl: 1    warfarin (COUMADIN) 1 mg tablet, 2.5 mg (2.5 mg x 1) every Mon, Wed, Fri; 3.5 mg (2.5 mg x 1 and 1 mg x 1) all other days, Disp: 48 Tab, Rfl: 1    PARoxetine (PaxiL) 20 mg tablet, Take 2 Tabs by mouth every evening., Disp: 90 Tab, Rfl: 1    aspirin delayed-release 81 mg tablet, Take  by mouth daily. , Disp: , Rfl:     atorvastatin (LIPITOR) 20 mg tablet, Take  by mouth daily. , Disp: , Rfl:     iron 18 mg tab, Take  by mouth., Disp: , Rfl:     ascorbic acid, vitamin C, (VITAMIN C) 500 mg tablet, Take  by mouth., Disp: , Rfl:     gluc case/chondro case A/vit C/Mn (GLUCOSAMINE 1500 COMPLEX PO), Take  by mouth., Disp: , Rfl:     vitamin e (E GEMS) 1,000 unit capsule, Take 1,000 Units by mouth daily. , Disp: , Rfl:     thiamine HCL (VITAMIN B-1) 100 mg tablet, Take  by mouth daily. , Disp: , Rfl:     folic acid (FOLVITE) 1 mg tablet, Take  by mouth daily. , Disp: , Rfl:     cholecalciferol (VITAMIN D3) (2,000 UNITS /50 MCG) cap capsule, Take  by mouth two (2) times a day., Disp: , Rfl:     magnesium 250 mg tab, Take  by mouth., Disp: , Rfl:     calcium carbonate (CALCIUM 500 PO), Take  by mouth., Disp: , Rfl:     turmeric (CURCUMIN), by Does Not Apply route., Disp: , Rfl:     History  Patient Care Team:  Ashley Ivan MD as PCP - General (Family Practice)  Ashley Ivan MD as PCP - [facility]    Past Medical History: he has a past medical history of Acoustic neuroma ([facility] (2/18/2020), Chronic deep vein thrombosis (DVT) of lower extremity ([address] (2/14/2020), Hypercholesteremia (2/14/2020), ISABELLA on CPAP (2/18/2020), and Paroxysmal tachycardia, unspecified ([address] (2/14/2020). Past Surgical History: he has a past surgical history that includes hx orthopaedic; hx colonoscopy; and hx hernia repair. Family Medical History: family history is not on file. Social History: he reports that he has quit smoking. His smoking use included pipe. He has never used smokeless tobacco. He reports current alcohol use. He reports that he does not use drugs. Objective:   Vital Signs  There were no vitals taken for this visit. Unable to obtain full set of vital signs today as patient does not have all equipment for this at home    Assessment & Plan:      ICD-10-CM ICD-9-CM    1.  Anxiety F41.9 300.00      Anxiety: Chronic, improved on increased dosing of Paxil with no SI; will continue and RTC 3 months      I was in the office while conducting this encounter. Consent:  He and/or his healthcare decision maker is aware that this patient-initiated Telehealth encounter is a billable service, with coverage as determined by his insurance carrier. He is aware that he may receive a bill and has provided verbal consent to proceed: Yes    This virtual visit was conducted telephone encounter only. -  I affirm this is a Patient Initiated Episode with an Established Patient who has not had a related appointment within my department in the past 7 days or scheduled within the next 24 hours. Note: this encounter is not billable if this call serves to triage the patient into an appointment for the relevant concern. Total Time: minutes: 30.        I have discussed the diagnosis with the patient and the intended plan as seen in the above orders. The patient has received an after-visit summary along with patient information handout. I have discussed medication side effects and warnings with the patient as well. Disposition  Follow-up and Dispositions    · Return in about 3 months (around 9/8/2020).            Nicho Vogt MD

## 2020-06-11 ENCOUNTER — ANTI-COAG VISIT (OUTPATIENT)
Dept: CARDIOLOGY CLINIC | Age: 80
End: 2020-06-11

## 2020-06-11 DIAGNOSIS — I82.5Z1 CHRONIC DEEP VEIN THROMBOSIS (DVT) OF DISTAL VEIN OF RIGHT LOWER EXTREMITY (HCC): ICD-10-CM

## 2020-06-11 DIAGNOSIS — Z79.01 CURRENT USE OF LONG TERM ANTICOAGULATION: Primary | ICD-10-CM

## 2020-06-11 LAB
INR BLD: NORMAL
INR, EXTERNAL: 2.2
PT POC: 2.2 SECONDS
VALID INTERNAL CONTROL?: YES

## 2020-06-11 NOTE — PROGRESS NOTES
A full discussion of the nature of anticoagulants has been carried out. A benefit risk analysis has been presented to the patient, so that they understand the justification for choosing anticoagulation at this time. The need for frequent and regular monitoring, precise dosage adjustment and compliance is stressed. Side effects of potential bleeding are discussed. The patient should avoid any OTC items containing aspirin or ibuprofen, and should avoid great swings in general diet. Avoid alcohol consumption. Call if any signs of abnormal bleeding. Next PT/INR test in 1 month. INR 2.2, continue current dosing regimen  Recheck INR in 1 month  He plans to establish cardiovascular care here with Dr. Ondina Albert for his AFib and would like to continue having his INR checked at this location. Anticoagulation Summary  As of 2020    INR goal:   2.0-3.0   TTR:   100.0 % (2.6 wk)   INR used for dosin.2 (2020)   Warfarin maintenance plan:   2.5 mg (2.5 mg x 1) every Mon, Wed, Fri; 3.5 mg (2.5 mg x 1 and 1 mg x 1) all other days   Weekly warfarin total:   21.5 mg   Plan last modified:   Martha Shepherd NP (2020)   Next INR check:   2020   Target end date:       Indications    Chronic deep [facility] thrombosis (DV[facility]) [facility] lower [facility] [I82.509]             Anticoagulation Episode Summary     INR check location:       Preferred lab:       Send INR reminders to:       Comments:         Anticoagulation Care Providers     Provider Role Specialty Phone number    Chinmay Sanabria MD [facility] [phone]          No future appointments.

## 2020-06-12 DIAGNOSIS — I82.5Z1 CHRONIC DEEP VEIN THROMBOSIS (DVT) OF DISTAL VEIN OF RIGHT LOWER EXTREMITY (HCC): ICD-10-CM

## 2020-06-12 RX ORDER — COMPRESS.STOCKING,KNEE,REG,MED
EACH MISCELLANEOUS
Qty: 2 EACH | Refills: 1 | Status: SHIPPED | OUTPATIENT
Start: 2020-06-12 | End: 2020-06-12

## 2020-06-12 RX ORDER — COMPRESS.STOCKING,KNEE,REG,MED
EACH MISCELLANEOUS
Qty: 2 EACH | Refills: 1 | Status: SHIPPED | OUTPATIENT
Start: 2020-06-12 | End: 2020-06-15 | Stop reason: SDUPTHER

## 2020-06-12 RX ORDER — COMPRESS.STOCKING,KNEE,REG,MED
EACH MISCELLANEOUS
Qty: 2 EACH | Refills: 1 | Status: SHIPPED | OUTPATIENT
Start: 2020-06-12 | End: 2020-08-10 | Stop reason: SDUPTHER

## 2020-06-15 DIAGNOSIS — I82.5Z1 CHRONIC DEEP VEIN THROMBOSIS (DVT) OF DISTAL VEIN OF RIGHT LOWER EXTREMITY (HCC): ICD-10-CM

## 2020-06-15 RX ORDER — COMPRESS.STOCKING,KNEE,REG,MED
EACH MISCELLANEOUS
Qty: 2 EACH | Refills: 1 | Status: SHIPPED | OUTPATIENT
Start: 2020-06-15

## 2020-06-17 RX ORDER — ATORVASTATIN CALCIUM 10 MG/1
10 TABLET, FILM COATED ORAL DAILY
Qty: 90 TAB | Refills: 1 | Status: SHIPPED | OUTPATIENT
Start: 2020-06-17 | End: 2020-12-23 | Stop reason: SDUPTHER

## 2020-06-17 NOTE — TELEPHONE ENCOUNTER
MD Mascorro Erp requesting new rx for atorvastatin 10mg 1 every day. Our med list has 20mg but is historical provider. Attached rx for the 10mg per pharmacy (written 6/4/19 #90) (last dispensed 3/23/20) if appropriate. Thanks, Lyndon Carlisle    Last Visit: V.V. 6/8/20  MD Indiana Sewell  Next Appointment: Not scheduled  Previous Refill Encounter(s): historical provider- Med list has 20mg current    Requested Prescriptions     Pending Prescriptions Disp Refills    atorvastatin (LIPITOR) 10 mg tablet 90 Tab 1     Sig: Take 1 Tab by mouth daily.

## 2020-08-06 ENCOUNTER — OFFICE VISIT (OUTPATIENT)
Dept: CARDIOLOGY CLINIC | Age: 80
End: 2020-08-06
Payer: MEDICARE

## 2020-08-06 VITALS
OXYGEN SATURATION: 98 % | DIASTOLIC BLOOD PRESSURE: 80 MMHG | SYSTOLIC BLOOD PRESSURE: 122 MMHG | HEIGHT: 72 IN | RESPIRATION RATE: 16 BRPM | BODY MASS INDEX: 26.22 KG/M2 | WEIGHT: 193.6 LBS | HEART RATE: 68 BPM

## 2020-08-06 DIAGNOSIS — I82.5Z1 CHRONIC DEEP VEIN THROMBOSIS (DVT) OF DISTAL VEIN OF RIGHT LOWER EXTREMITY (HCC): ICD-10-CM

## 2020-08-06 DIAGNOSIS — I25.10 CORONARY ARTERY DISEASE INVOLVING NATIVE CORONARY ARTERY OF NATIVE HEART WITHOUT ANGINA PECTORIS: ICD-10-CM

## 2020-08-06 DIAGNOSIS — I47.9 PAROXYSMAL TACHYCARDIA, UNSPECIFIED (HCC): Primary | ICD-10-CM

## 2020-08-06 DIAGNOSIS — E78.00 HYPERCHOLESTEREMIA: ICD-10-CM

## 2020-08-06 DIAGNOSIS — R01.1 HEART MURMUR: ICD-10-CM

## 2020-08-06 PROCEDURE — 93005 ELECTROCARDIOGRAM TRACING: CPT | Performed by: INTERNAL MEDICINE

## 2020-08-06 PROCEDURE — 93010 ELECTROCARDIOGRAM REPORT: CPT | Performed by: INTERNAL MEDICINE

## 2020-08-06 PROCEDURE — 99204 OFFICE O/P NEW MOD 45 MIN: CPT | Performed by: INTERNAL MEDICINE

## 2020-08-06 NOTE — PROGRESS NOTES
[facility] [place]: Laura Latif  [phone]  Requesting/referring provider: Dr. Leta Mcfarland  Reason for Consult: afib, leg swelling    HPI: Pauline Galindo, a 70y.o. year-old who presents for evaluation of afib and leg swelling. Has afib. Kennedy Clifton today on the EKG,   Had hid riight leg crushed with DVT and phlebitis after a rugby injury. On coumadin for that. Prev followed in [facility]. Was offered eliquis and hasn't done it hes been stable on the coumadin for years and is happy with it. He may want to do iINR testing at home. Echo a year ago with aortic valve sclerosis without stenosis  He has shortness of breath, has had it for years. Last stress test 8 years ago. Needs that again now. Assessment/Plan:  1. DVT on coumadin with PE  2. ISABELLA on CPAP  3. Paf and aflutter stable on coumadin rate controlled on sotalol  4. 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 Dyslipidemia- atorvastatin at goal  6. CHIRINOS may be anginal equivalent, proceed with stress testing. Fhx no early CAD  Soc repote pipe smoker    He  has a past medical history of Acoustic neuroma ([address] (2/18/2020), Chronic deep vein thrombosis (DVT) of lower extremity ([address] (2/14/2020), Hypercholesteremia (2/14/2020), ISABELLA on CPAP (2/18/2020), and Paroxysmal tachycardia, unspecified ([address] (2/14/2020). Cardiovascular ROS: positive for - dyspnea on exertion  Respiratory ROS: positive for - shortness of breath  Neurological ROS: no TIA or stroke symptoms  All other systems negative except as above. PE  Vitals:    08/06/20 1006   BP: 122/80   Pulse: 68   Resp: 16   SpO2: 98%   Weight: 193 lb 9.6 oz (87.8 kg)   Height: 6' (1.829 m)    Body mass index is 26.26 kg/m².    General appearance - alert, well appearing, and in no distress  Mental status - affect appropriate to mood  Eyes - sclera anicteric, moist mucous membranes  Neck - supple, no significant adenopathy  Lymphatics - no  lymphadenopathy  Chest - clear to auscultation, no wheezes, rales or rhonchi  Heart - normal rate, regular rhythm, normal S1, S2, no murmurs, rubs, clicks or gallops  Abdomen - soft, nontender, nondistended, no masses or organomegaly  Back exam - full range of motion, no tenderness  Neurological - cranial nerves II through XII grossly intact, no focal deficit  Musculoskeletal - no muscular tenderness noted, normal strength  Extremities - peripheral pulses normal, mild RLE swelling  Skin - normal coloration  no rashes    Recent Labs:  No results found for: CHOL, CHOLX, CHLST, CHOLV, 669900, HDL, HDLP, LDL, LDLC, DLDLP, TGLX, TRIGL, TRIGP, CHHD, CHHDX  Lab Results   Component Value Date/Time    Creatinine 0.85 03/12/2020 11:50 AM     Lab Results   Component Value Date/Time    BUN 17 03/12/2020 11:50 AM     Lab Results   Component Value Date/Time    Potassium 5.4 (H) 03/12/2020 11:50 AM     No results found for: HBA1C, HGBE8, ABR3PDQN  Lab Results   Component Value Date/Time    HGB 13.4 02/14/2020 04:50 PM     Lab Results   Component Value Date/Time    PLATELET 599 87/26/7341 04:50 PM       Reviewed:  Past Medical History:   Diagnosis Date    Acoustic neuroma ([facility] 2/18/2020    [facility] 2/14/2020    1.8 to 2.5 is goal    Hypercholesteremia 2/14/2020    Lipitor 20mg    ISABELLA on CPAP 2/18/2020    Paroxysmal tachycardia, unspecified ([facility] 2/14/2020    Sotalol Saw Corinna Johnson MD      Social History     Tobacco Use   Smoking Status Former Smoker    Types: Pipe   Smokeless Tobacco Never Used     Social History     Substance and Sexual Activity   Alcohol Use Yes    Drinks per session: 1 or 2    Binge frequency: Never     Allergies   Allergen Reactions    Sulfa (Sulfonamide Antibiotics) Rash       Current Outpatient Medications   Medication Sig    atorvastatin (LIPITOR) 10 mg tablet Take 1 Tab by mouth daily.  Comp. Stocking,Knee,Regular,Lrg (Relief Knee Open Toe Stocking) misc Use daily for lower extremity swelling, Size large 30-40mm, Model # [id number]    sotaloL (BETAPACE) 80 mg tablet Take 1 Tab by mouth two (2) times a day.  warfarin (Coumadin) 2.5 mg tablet 2.5 mg (2.5 mg x 1) every Mon, Wed, Fri; 3.5 mg (2.5 mg x 1 and 1 mg x 1) all other days    warfarin (COUMADIN) 1 mg tablet 2.5 mg (2.5 mg x 1) every Mon, Wed, Fri; 3.5 mg (2.5 mg x 1 and 1 mg x 1) all other days    PARoxetine (PaxiL) 20 mg tablet Take 2 Tabs by mouth every evening.  aspirin delayed-release 81 mg tablet Take  by mouth daily.  iron 18 mg tab Take  by mouth.  ascorbic acid, vitamin C, (VITAMIN C) 500 mg tablet Take  by mouth.  gluc case/chondro case A/vit C/Mn (GLUCOSAMINE 1500 COMPLEX PO) Take  by mouth.  vitamin e (E GEMS) 1,000 unit capsule Take 1,000 Units by mouth daily.  thiamine HCL (VITAMIN B-1) 100 mg tablet Take  by mouth daily.  folic acid (FOLVITE) 1 mg tablet Take  by mouth daily.  cholecalciferol (VITAMIN D3) (2,000 UNITS /50 MCG) cap capsule Take  by mouth two (2) times a day.  magnesium 250 mg tab Take  by mouth.  calcium carbonate (CALCIUM 500 PO) Take  by mouth.  turmeric (CURCUMIN) by Does Not Apply route.  Comp. Stocking,Knee,Regular,Lrg (Relief Knee Open Toe Stocking) misc Use daily for lower extremity swelling, Size large 30-40mm, Model # [id number]     No current facility-administered medications for this visit.         Jl Pat MD  [facility] [place]  [address] [facility]  [address]

## 2020-08-10 ENCOUNTER — OFFICE VISIT (OUTPATIENT)
Dept: FAMILY MEDICINE CLINIC | Age: 80
End: 2020-08-10
Payer: MEDICARE

## 2020-08-10 VITALS
SYSTOLIC BLOOD PRESSURE: 112 MMHG | WEIGHT: 194.4 LBS | DIASTOLIC BLOOD PRESSURE: 68 MMHG | HEIGHT: 72 IN | BODY MASS INDEX: 26.33 KG/M2 | TEMPERATURE: 98.5 F | RESPIRATION RATE: 16 BRPM | OXYGEN SATURATION: 98 % | HEART RATE: 58 BPM

## 2020-08-10 DIAGNOSIS — R06.09 DYSPNEA ON EXERTION: ICD-10-CM

## 2020-08-10 DIAGNOSIS — Z98.890 HX OF MELANOMA EXCISION: ICD-10-CM

## 2020-08-10 DIAGNOSIS — M1A.9XX0 CHRONIC GOUT WITHOUT TOPHUS, UNSPECIFIED CAUSE, UNSPECIFIED SITE: ICD-10-CM

## 2020-08-10 DIAGNOSIS — M25.559 HIP PAIN: Primary | ICD-10-CM
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
## 2020-08-10 DIAGNOSIS — I48.91 ATRIAL FIBRILLATION, UNSPECIFIED TYPE (HCC): ICD-10-CM

## 2020-08-10 DIAGNOSIS — Z79.01 ANTICOAGULATED ON COUMADIN: ICD-10-CM

## 2020-08-10 DIAGNOSIS — D33.3 ACOUSTIC NEUROMA (HCC): ICD-10-CM

## 2020-08-10 DIAGNOSIS — M79.89 TOE SWELLING: ICD-10-CM

## 2020-08-10 DIAGNOSIS — E78.00 HYPERCHOLESTEREMIA: ICD-10-CM

## 2020-08-10 DIAGNOSIS — Z85.820 HX OF MELANOMA EXCISION: ICD-10-CM

## 2020-08-10 DIAGNOSIS — I82.5Z1 CHRONIC DEEP VEIN THROMBOSIS (DVT) OF DISTAL VEIN OF RIGHT LOWER EXTREMITY (HCC): ICD-10-CM

## 2020-08-10 PROCEDURE — 99213 OFFICE O/P EST LOW 20 MIN: CPT | Performed by: FAMILY MEDICINE

## 2020-08-10 NOTE — PROGRESS NOTES
Chief Complaint   Patient presents with    Toe Swelling     great toe on right foot, does he need to do anything with his toes since     Foot Swelling    Hip Pain     L >R     1. Have you been to the ER, urgent care clinic since your last visit? Hospitalized since your last visit? No    2. Have you seen or consulted any other health care providers outside of the [address] [place] [address] since your last visit? Include any pap smears or colon screening.  No     Due AWV

## 2020-08-10 NOTE — PROGRESS NOTES
Donna Marsh  70 y.o. male  1940  [place]  [id number]     [address] [facility] PRACTICE       Encounter Date: 8/10/2020           Established Patient Visit Note: Wilbert Mina MD    Reason for Appointment:  Chief Complaint   Patient presents with    Toe Swelling     great toe on right foot, does he need to do anything with his toes since     Foot Swelling    Hip Pain     L >R       History of Present Illness:  History provided by patient    Scott José is a 70 y.o. male who presents to clinic today for:    Hip Pain  Duration: 6 months  Location: left and right hip, worse on left; also reports pain in gluteal region  Denies any lower back pain  Denies any symptoms of sciatica    Toe Swelling  Duration: several years  Symptoms: denies any pain, burning, or itching  Podiatry: has not seen podiatry  He reports having a history of gout with last exacerbation 5 years ago  Last uric acid: 6.3 (8/2019)    He has hx of significant sun exposure as well as prior hx of melanoma excision. 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 He will be seeing ENT Mark Garcia MD) next week  PAF/Aflutter, Chronic CHIRINOS: recently saw cardiology and stress test is planned      Review of Systems  Review of Systems   Constitutional: Negative for chills and fever. Respiratory: Negative for cough, shortness of breath (has chronic CHIRINOS) and wheezing. Cardiovascular: Negative for chest pain and palpitations. Allergies: Sulfa (sulfonamide antibiotics)    Medications: (Updated to reflect final medication list after visit)    Current Outpatient Medications:     atorvastatin (LIPITOR) 10 mg tablet, Take 1 Tab by mouth daily. , Disp: 90 Tab, Rfl: 1    Comp. Stocking,Knee,Regular,Lrg (Relief Knee Open Toe Stocking) misc, Use daily for lower extremity swelling, Size large 30-40mm, Model # [id number], Disp: 2 Each, Rfl: 1    sotaloL (BETAPACE) 80 mg tablet, Take 1 Tab by mouth two (2) times a day., Disp: 180 Tab, Rfl: 1    warfarin (Coumadin) 2.5 mg tablet, 2.5 mg (2.5 mg x 1) every Mon, Wed, Fri; 3.5 mg (2.5 mg x 1 and 1 mg x 1) all other days (Patient taking differently: 2.5 mg (2.5 mg x 1) every Mon, Wed 3.5 mg (2.5 mg x 1 and 1 mg x 1) all other days), Disp: 90 Tab, Rfl: 1    warfarin (COUMADIN) 1 mg tablet, 2.5 mg (2.5 mg x 1) every Mon, Wed, Fri; 3.5 mg (2.5 mg x 1 and 1 mg x 1) all other days, Disp: 48 Tab, Rfl: 1    PARoxetine (PaxiL) 20 mg tablet, Take 2 Tabs by mouth every evening., Disp: 90 Tab, Rfl: 1    aspirin delayed-release 81 mg tablet, Take  by mouth daily. , Disp: , Rfl:     iron 18 mg tab, Take  by mouth., Disp: , Rfl:     ascorbic acid, vitamin C, (VITAMIN C) 500 mg tablet, Take  by mouth., Disp: , Rfl:     gluc case/chondro case A/vit C/Mn (GLUCOSAMINE 1500 COMPLEX PO), Take  by mouth., Disp: , Rfl:     vitamin e (E GEMS) 1,000 unit capsule, Take 1,000 Units by mouth daily. , Disp: , Rfl:     thiamine HCL (VITAMIN B-1) 100 mg tablet, Take  by mouth daily. , Disp: , Rfl:     folic acid (FOLVITE) 1 mg tablet, Take  by mouth daily. , Disp: , Rfl:     cholecalciferol (VITAMIN D3) (2,000 UNITS /50 MCG) cap capsule, Take  by mouth two (2) times a day., Disp: , Rfl:     magnesium 250 mg tab, Take  by mouth., Disp: , Rfl:     calcium carbonate (CALCIUM 500 PO), Take  by mouth., Disp: , Rfl:     turmeric (CURCUMIN), by Does Not Apply route., Disp: , Rfl:     History  Patient Care Team:  Kyaw Chong MD as PCP - General (Family Medicine)  Kyaw Chong MD as PCP - [facility]  Renae Mendiola MD as Physician (Otolaryngology)    Past Medical History: he has a past medical history of Acoustic neuroma ([facility] [address] (2/18/2020), Chronic deep vein thrombosis (DVT) of lower extremity ([facility] [address] (2/14/2020), Hypercholesteremia (2/14/2020), ISABELLA on CPAP (2/18/2020), and Paroxysmal tachycardia, unspecified ([facility] [address] (2/14/2020).     Past Surgical History: he has a past surgical history that includes hx orthopaedic; hx colonoscopy; and hx hernia repair. Family Medical History: family history is not on file. Social History: he reports that he has quit smoking. His smoking use included pipe. He has never used smokeless tobacco. He reports current alcohol use. He reports that he does not use drugs. Health Maintenance Due   Topic Date Due    DTaP/Tdap/Td series (1 - Tdap) 10/10/1963    Pneumococcal 65+ years (1 of 1 - PPSV23) 10/10/2007    Shingrix Vaccine Age 50> (2 of 2) 12/27/2019    Medicare Yearly Exam  02/14/2020    Lipid Screen  08/14/2020       Objective:   Visit Vitals  /68 (BP 1 Location: Left arm, BP Patient Position: Sitting)   Pulse (!) 58   Temp 98.5 °F (36.9 °C) (Oral)   Resp 16   Ht 6' (1.829 m)   Wt 194 lb 6.4 oz (88.2 kg)   SpO2 98%   BMI 26.37 kg/m²     Wt Readings from Last 3 Encounters:   08/10/20 194 lb 6.4 oz (88.2 kg)   08/06/20 193 lb 9.6 oz (87.8 kg)   03/27/20 190 lb (86.2 kg)       Physical Exam  HENT:      Head: Normocephalic and atraumatic. Right Ear: External ear normal.      Left Ear: External ear normal.      Nose: Nose normal.      Mouth/Throat:      Pharynx: No oropharyngeal exudate. Eyes:      General: No scleral icterus. Right eye: No discharge. Left eye: No discharge. Conjunctiva/sclera: Conjunctivae normal.      Pupils: Pupils are equal, round, and reactive to light. Neck:      Musculoskeletal: Normal range of motion and neck supple. Thyroid: No thyromegaly. Vascular: No JVD. Trachea: No tracheal deviation. Cardiovascular:      Rate and Rhythm: Normal rate and regular rhythm. Heart sounds: Normal heart sounds. No murmur. No friction rub. No gallop. Pulmonary:      Effort: Pulmonary effort is normal. No respiratory distress. Breath sounds: Normal breath sounds. No stridor. No wheezing. Abdominal:      General: Bowel sounds are normal. There is no distension. Palpations: Abdomen is soft. There is no mass. Tenderness: There is no abdominal tenderness. There is no guarding or rebound. Musculoskeletal: Normal range of motion. General: No tenderness or deformity. Lymphadenopathy:      Cervical: No cervical adenopathy. Skin:     General: Skin is warm and dry. Neurological:      Mental Status: He is alert. Cranial Nerves: No cranial nerve deficit. Coordination: Coordination normal.      Gait: Gait is intact. Deep Tendon Reflexes: Reflexes normal.         Assessment & Plan:      ICD-10-CM ICD-9-CM    1. Hip pain  M25.559 719.45 XR SPINE LUMB 2 OR 3 V      XR HIP LT W OR WO PELV 2-3 VWS      XR HIP RT W OR WO PELV 2-3 VWS   2. Toe swelling  M79.89 729.81 REFERRAL TO PODIATRY   3. Acoustic neuroma (HCC)  D33.3 225.1    4. Moderate sun exposure, subsequent encounter  X32. XXXD [id number] REFERRAL TO DERMATOLOGY   5. Hx of melanoma excision  Z98.890 V15.29 REFERRAL TO DERMATOLOGY    Z85.820     6. Hypercholesteremia  U77.88 513.1 METABOLIC PANEL, COMPREHENSIVE      MAGNESIUM      LIPID PANEL   7. Chronic deep vein thrombosis (DVT) of distal vein of right lower extremity ([facility])  I82.5Z1 453.52 CBC WITH AUTOMATED DIFF   8. Anticoagulated on Coumadin  Z79.01 V58.61 CBC WITH AUTOMATED DIFF   9. Chronic gout without tophus, unspecified cause, unspecified site  M1A. 9XX0 274.02 URIC ACID   10. Atrial fibrillation, unspecified type ([facility])  I48.91 427.31 TSH REFLEX TO T4   11. Dyspnea on exertion  R06.00 786.09      · Hip Pain: Chronic, will obtain xrays to evaluate further  · Toe Swelling in setting of chronic gout: patient with hx of gout but no tophus on exam; no sign of infection on exam; will check uric acid and provide referral to podiatry for further evaluation  · Acoustic Neuroma: follow up with ENT as scheduled  · Hx of moderate sun exposure and prior hx of melanoma:  Will provide referral to dermatology skin check  · On AC of chronic DVT: Chronic, continue coumadin per cardiology  · Chronic CHIRINOS: follow up as scheduled for stress test with cardiology          I have discussed the diagnosis with the patient and the intended plan as seen in the above orders. The patient has received an after-visit summary along with patient information handout. I have discussed medication side effects and warnings with the patient as well. Disposition  Follow-up and Dispositions    · Return in about 4 weeks (around 9/7/2020) for annual wellness visit.            Sarah Khoury MD

## 2020-08-16 PROBLEM — M25.559 HIP PAIN: Status: ACTIVE | Noted: 2020-08-16

## 2020-08-16 PROBLEM — Z85.820 HX OF MELANOMA EXCISION: Status: ACTIVE | Noted: 2020-08-16

## 2020-08-16 PROBLEM — Z79.01 ANTICOAGULATED ON COUMADIN: Status: ACTIVE | Noted: 2020-08-16

## 2020-08-16 PROBLEM — M1A.9XX0 CHRONIC GOUT WITHOUT TOPHUS: Status: ACTIVE | Noted: 2020-08-16
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
## 2020-08-16 PROBLEM — I48.91 ATRIAL FIBRILLATION (HCC): Status: ACTIVE | Noted: 2020-08-16

## 2020-08-16 PROBLEM — Z98.890 HX OF MELANOMA EXCISION: Status: ACTIVE | Noted: 2020-08-16

## 2020-08-16 PROBLEM — M79.89 TOE SWELLING: Status: ACTIVE | Noted: 2020-08-16

## 2020-08-17 ENCOUNTER — ANTI-COAG VISIT (OUTPATIENT)
Dept: CARDIOLOGY CLINIC | Age: 80
End: 2020-08-17
Payer: MEDICARE

## 2020-08-17 DIAGNOSIS — I82.5Z1 CHRONIC DEEP VEIN THROMBOSIS (DVT) OF DISTAL VEIN OF RIGHT LOWER EXTREMITY (HCC): ICD-10-CM

## 2020-08-17 LAB
INR BLD: 3.6
PT POC: NORMAL
VALID INTERNAL CONTROL?: YES

## 2020-08-17 PROCEDURE — 85610 PROTHROMBIN TIME: CPT | Performed by: INTERNAL MEDICINE

## 2020-08-17 NOTE — PROGRESS NOTES
A full discussion of the nature of anticoagulants has been carried out. A benefit risk analysis has been presented to the patient, so that they understand the justification for choosing anticoagulation at this time. The need for frequent and regular monitoring, precise dosage adjustment and compliance is stressed. Side effects of potential bleeding are discussed. The patient should avoid any OTC items containing aspirin or ibuprofen, and should avoid great swings in general diet. Avoid alcohol consumption. Call if any signs of abnormal bleeding. Next PT/INR test in 1 week with nuclear test.    Patient has had a lot going on this week due to wife having surgery. Would like to resume prior dosing. Will recheck levels next week. Patient verbalized understanding      Anticoagulation Summary  As of 8/17/2020    INR goal:   2.0-3.0   TTR:   54.3 % (2.8 mo)   INR used for dosing:   3.6!  (8/17/2020)   Warfarin maintenance plan:   2.5 mg (2.5 mg x 1) every Mon, Wed, Fri; 3.5 mg (2.5 mg x 1 and 1 mg x 1) all other days   Weekly warfarin total:   21.5 mg   Plan last modified:   Aislinn Lugo RN (8/17/2020)   Next INR check:   8/27/2020   Target end date:       Indications    Chronic deep vein thrombosis (DVT) of lower extremity ([facility] [address] [I82.509]             Anticoagulation Episode Summary     INR check location:       Preferred lab:       Send INR reminders to:       Comments:         Anticoagulation Care Providers     Provider Role Specialty Phone number    Charu Donaldson MD [facility] [phone]          Future Appointments   Date Time Provider Corinna Johnston   8/27/2020 10:00 AM VERENICE LARSEN BS AMB   8/27/2020 10:40 AM COUMVERENICE CHAMBERS AMB   9/30/2020 10:00 AM MD FATEMEH Jewell BS AMB   2/9/2021 10:20 AM MD ROBLES Cárdenas BS AMB

## 2020-08-18 DIAGNOSIS — F41.9 ANXIETY: ICD-10-CM

## 2020-08-18 RX ORDER — PAROXETINE HYDROCHLORIDE 20 MG/1
40 TABLET, FILM COATED ORAL EVERY EVENING
Qty: 180 TAB | Refills: 1 | Status: SHIPPED | OUTPATIENT
Start: 2020-08-18 | End: 2021-02-11 | Stop reason: SDUPTHER

## 2020-08-18 NOTE — TELEPHONE ENCOUNTER
Updated rx to 90 d/s + 1. (Takes 2/day)  ThanksTonie    Last Visit: 8/10/20  MD Borden Hidden  Next Appointment: 9/30/20  MD Borden Hidden  Previous Refill Encounter(s): 5/10/20  90 + 1 (90 days)    Requested Prescriptions     Pending Prescriptions Disp Refills    PARoxetine (PaxiL) 20 mg tablet 180 Tab 1     Sig: Take 2 Tabs by mouth every evening.

## 2020-08-26 ENCOUNTER — TELEPHONE (OUTPATIENT)
Dept: CARDIOLOGY CLINIC | Age: 80
End: 2020-08-26

## 2020-08-26 NOTE — TELEPHONE ENCOUNTER
Spoke with patient, identifiers x2. COVID prescreening performed. Screening negative. Stress test instructions reviewed with patient. Patient verbalized understanding.

## 2020-08-27 ENCOUNTER — ANTI-COAG VISIT (OUTPATIENT)
Dept: CARDIOLOGY CLINIC | Age: 80
End: 2020-08-27
Payer: MEDICARE

## 2020-08-27 ENCOUNTER — ANCILLARY PROCEDURE (OUTPATIENT)
Dept: CARDIOLOGY CLINIC | Age: 80
End: 2020-08-27
Payer: MEDICARE

## 2020-08-27 VITALS
WEIGHT: 194 LBS | BODY MASS INDEX: 26.28 KG/M2 | DIASTOLIC BLOOD PRESSURE: 70 MMHG | HEIGHT: 72 IN | SYSTOLIC BLOOD PRESSURE: 122 MMHG

## 2020-08-27 DIAGNOSIS — I47.9 PAROXYSMAL TACHYCARDIA, UNSPECIFIED (HCC): ICD-10-CM

## 2020-08-27 DIAGNOSIS — R01.1 HEART MURMUR: ICD-10-CM

## 2020-08-27 DIAGNOSIS — I82.5Z1 CHRONIC DEEP VEIN THROMBOSIS (DVT) OF DISTAL VEIN OF RIGHT LOWER EXTREMITY (HCC): ICD-10-CM

## 2020-08-27 DIAGNOSIS — I48.91 ATRIAL FIBRILLATION, UNSPECIFIED TYPE (HCC): ICD-10-CM

## 2020-08-27 DIAGNOSIS — R07.9 CHEST PAIN IN ADULT: ICD-10-CM

## 2020-08-27 DIAGNOSIS — R06.09 CHRONIC DYSPNEA: ICD-10-CM

## 2020-08-27 DIAGNOSIS — E78.00 HYPERCHOLESTEREMIA: ICD-10-CM

## 2020-08-27 LAB
INR BLD: 1.5
PT POC: NORMAL
STRESS BASELINE DIAS BP: 70 MMHG
STRESS BASELINE HR: 55 BPM
STRESS BASELINE SYS BP: 122 MMHG
STRESS O2 SAT PEAK: 97 %
STRESS O2 SAT REST: 98 %
STRESS PEAK DIAS BP: 60 MMHG
STRESS PEAK SYS BP: 104 MMHG
STRESS PERCENT HR ACHIEVED: 51 %
STRESS POST PEAK HR: 73 BPM
STRESS RATE PRESSURE PRODUCT: 7592 BPM*MMHG
STRESS ST DEPRESSION: 0 MM
STRESS ST ELEVATION: 0 MM
STRESS TARGET HR: 143 BPM
VALID INTERNAL CONTROL?: YES

## 2020-08-27 PROCEDURE — 93018 CV STRESS TEST I&R ONLY: CPT | Performed by: INTERNAL MEDICINE

## 2020-08-27 PROCEDURE — 78452 HT MUSCLE IMAGE SPECT MULT: CPT | Performed by: INTERNAL MEDICINE

## 2020-08-27 PROCEDURE — 93017 CV STRESS TEST TRACING ONLY: CPT | Performed by: INTERNAL MEDICINE

## 2020-08-27 PROCEDURE — 93016 CV STRESS TEST SUPVJ ONLY: CPT | Performed by: INTERNAL MEDICINE

## 2020-08-27 PROCEDURE — 85610 PROTHROMBIN TIME: CPT | Performed by: INTERNAL MEDICINE

## 2020-08-27 PROCEDURE — A9500 TC99M SESTAMIBI: HCPCS | Performed by: INTERNAL MEDICINE

## 2020-08-27 RX ORDER — TETRAKIS(2-METHOXYISOBUTYLISOCYANIDE)COPPER(I) TETRAFLUOROBORATE 1 MG/ML
10 INJECTION, POWDER, LYOPHILIZED, FOR SOLUTION INTRAVENOUS ONCE
Status: COMPLETED | OUTPATIENT
Start: 2020-08-27 | End: 2020-08-27

## 2020-08-27 RX ORDER — TETRAKIS(2-METHOXYISOBUTYLISOCYANIDE)COPPER(I) TETRAFLUOROBORATE 1 MG/ML
30 INJECTION, POWDER, LYOPHILIZED, FOR SOLUTION INTRAVENOUS ONCE
Status: COMPLETED | OUTPATIENT
Start: 2020-08-27 | End: 2020-08-27

## 2020-08-27 RX ADMIN — TETRAKIS(2-METHOXYISOBUTYLISOCYANIDE)COPPER(I) TETRAFLUOROBORATE 25.7 MILLICURIE: 1 INJECTION, POWDER, LYOPHILIZED, FOR SOLUTION INTRAVENOUS at 12:00

## 2020-08-27 RX ADMIN — TETRAKIS(2-METHOXYISOBUTYLISOCYANIDE)COPPER(I) TETRAFLUOROBORATE 8.4 MILLICURIE: 1 INJECTION, POWDER, LYOPHILIZED, FOR SOLUTION INTRAVENOUS at 10:40

## 2020-08-27 RX ADMIN — REGADENOSON 0.4 MG: 0.08 INJECTION, SOLUTION INTRAVENOUS at 12:00

## 2020-08-27 NOTE — PROGRESS NOTES
A full discussion of the nature of anticoagulants has been carried out. A benefit risk analysis has been presented to the patient, so that they understand the justification for choosing anticoagulation at this time. The need for frequent and regular monitoring, precise dosage adjustment and compliance is stressed. Side effects of potential bleeding are discussed. The patient should avoid any OTC items containing aspirin or ibuprofen, and should avoid great swings in general diet. Avoid alcohol consumption. Call if any signs of abnormal bleeding. Next PT/INR test in 1 week. Levels are up and down. Patient wants to dictate what does he wants to take. States he wants to take 3.5 mg daily. Informed patient that when he was on 3.5 mg 5x a week his levels were 3.6. Patient reports his dose was 3.5 mg daily for a long time prior. Will recheck levels next week.       Anticoagulation Summary  As of 2020    INR goal:   2.0-3.0   TTR:   53.6 % (3.2 mo)   INR used for dosin.5! (2020)   Warfarin maintenance plan:   3.5 mg (2.5 mg x 1 and 1 mg x 1) every day   Weekly warfarin total:   24.5 mg   Plan last modified:   Suma Smith RN (2020)   Next INR check:   2020   Target end date:       Indications    Chronic deep vein thrombosis (DVT) of lower extremity ([facility] [address] [I82.509]             Anticoagulation Episode Summary     INR check location:       Preferred lab:       Send INR reminders to:       Comments:         Anticoagulation Care Providers     Provider Role Specialty Phone number    Francis Gray MD [facility] [phone]          Future Appointments   Date Time Provider Corinna Johnston   2020 10:40 AM VERENICE ESTRADA BS AMB   2020  2:00 PM VERENICE ESTRADA BS AMB   2020 10:00 AM MD FATEMEH Bobby BS AMB   2021 10:20 AM MD ROBLES Rangel BS AMB

## 2020-08-28 ENCOUNTER — TELEPHONE (OUTPATIENT)
Dept: CARDIOLOGY CLINIC | Age: 80
End: 2020-08-28

## 2020-08-28 NOTE — TELEPHONE ENCOUNTER
----- Message from Ewa Aviles MD sent at 8/27/2020  4:32 PM EDT -----  Normal stress test no evidence of blockages! yay! My chart message given to patient.

## 2020-09-02 ENCOUNTER — ANTI-COAG VISIT (OUTPATIENT)
Dept: CARDIOLOGY CLINIC | Age: 80
End: 2020-09-02
Payer: MEDICARE

## 2020-09-02 DIAGNOSIS — I82.5Z1 CHRONIC DEEP VEIN THROMBOSIS (DVT) OF DISTAL VEIN OF RIGHT LOWER EXTREMITY (HCC): ICD-10-CM

## 2020-09-02 LAB
INR BLD: 2.1
PT POC: NORMAL
VALID INTERNAL CONTROL?: YES

## 2020-09-02 PROCEDURE — 85610 PROTHROMBIN TIME: CPT | Performed by: INTERNAL MEDICINE

## 2020-09-02 NOTE — PROGRESS NOTES
A full discussion of the nature of anticoagulants has been carried out. A benefit risk analysis has been presented to the patient, so that they understand the justification for choosing anticoagulation at this time. The need for frequent and regular monitoring, precise dosage adjustment and compliance is stressed. Side effects of potential bleeding are discussed. The patient should avoid any OTC items containing aspirin or ibuprofen, and should avoid great swings in general diet. Avoid alcohol consumption. Call if any signs of abnormal bleeding. Next PT/INR test in 2 weeks. Levels better. Patient reports increased aleve intake recently which accounted for elevated levels. Continue current dosing regimen.    Patient verbalized understanding      Anticoagulation Summary  As of 2020    INR goal:   2.0-3.0   TTR:   51.3 % (3.4 mo)   INR used for dosin.1 (2020)   Warfarin maintenance plan:   3.5 mg (2.5 mg x 1 and 1 mg x 1) every day   Weekly warfarin total:   24.5 mg   Plan last modified:   Anita Johnson RN (2020)   Next INR check:   2020   Target end date:       Indications    Chronic deep vein thrombosis (DV[facility]) [facility] [I82.509]             Anticoagulation Episode Summary     INR check location:       Preferred lab:       Send INR reminders to:       Comments:         Anticoagulation Care Providers     Provider Role Specialty Phone number    Dilshad Terry MD [facility] [phone]          Future Appointments   Date Time Provider Corinna Johnston   2020 10:00 AM VERENICE ESTRADA BS AMB   2020 10:00 AM MD FATEMEH Emmanuel BS AMB   2021 10:20 AM MD ROBLES Lemos BS AMB

## 2020-09-16 ENCOUNTER — ANTI-COAG VISIT (OUTPATIENT)
Dept: CARDIOLOGY CLINIC | Age: 80
End: 2020-09-16
Payer: MEDICARE

## 2020-09-16 DIAGNOSIS — I82.5Z1 CHRONIC DEEP VEIN THROMBOSIS (DVT) OF DISTAL VEIN OF RIGHT LOWER EXTREMITY (HCC): ICD-10-CM

## 2020-09-16 LAB
INR BLD: 3.9
PT POC: NORMAL
VALID INTERNAL CONTROL?: YES

## 2020-09-16 PROCEDURE — 85610 PROTHROMBIN TIME: CPT | Performed by: INTERNAL MEDICINE

## 2020-09-16 NOTE — PROGRESS NOTES
A full discussion of the nature of anticoagulants has been carried out. A benefit risk analysis has been presented to the patient, so that they understand the justification for choosing anticoagulation at this time. The need for frequent and regular monitoring, precise dosage adjustment and compliance is stressed. Side effects of potential bleeding are discussed. The patient should avoid any OTC items containing aspirin or ibuprofen, and should avoid great swings in general diet. Avoid alcohol consumption. Call if any signs of abnormal bleeding. Next PT/INR test in 1 week. Levels now elevated. Instructed to decrease dose to 2.5 mg on M/W/F and 3.5 mg all other days. Written and verbal instructions given to patient.    Patient verbalized understanding    TWD decreased 12.2%    Anticoagulation Summary  As of 9/16/2020    INR goal:   2.0-3.0   TTR:   51.2 % (3.8 mo)   INR used for dosing:   3.9! (9/16/2020)   Warfarin maintenance plan:   2.5 mg (2.5 mg x 1) every Mon, Wed, Fri; 3.5 mg (2.5 mg x 1 and 1 mg x 1) all other days   Weekly warfarin total:   21.5 mg   Plan last modified:   Cuco Cm RN (9/16/2020)   Next INR check:   9/23/2020   Target end date:       Indications    Chronic deep vein thrombosis (DV[facility]) [facility] lower [facility] [I82.509]             Anticoagulation Episode Summary     INR check location:       Preferred lab:       Send INR reminders to:       Comments:         Anticoagulation Care Providers     Provider Role Specialty Phone number    Amrik Mora MD [facility] [phone]          Future Appointments   Date Time Provider Corinna Johnston   9/23/2020 10:20 AM VERENICE ESTRADA BS AMB   9/30/2020 10:00 AM MD FATEMEH Gilliland BS AMB   2/9/2021 10:20 AM MD ROBLES Guerrero BS AMB

## 2020-09-28 ENCOUNTER — ANTI-COAG VISIT (OUTPATIENT)
Dept: CARDIOLOGY CLINIC | Age: 80
End: 2020-09-28
Payer: MEDICARE

## 2020-09-28 DIAGNOSIS — Z79.01 LONG TERM (CURRENT) USE OF ANTICOAGULANTS: ICD-10-CM

## 2020-09-28 DIAGNOSIS — I82.5Z1 CHRONIC DEEP VEIN THROMBOSIS (DVT) OF DISTAL VEIN OF RIGHT LOWER EXTREMITY (HCC): ICD-10-CM

## 2020-09-28 LAB
INR BLD: 2.5
PT POC: NORMAL
VALID INTERNAL CONTROL?: YES

## 2020-09-28 PROCEDURE — 85610 PROTHROMBIN TIME: CPT | Performed by: INTERNAL MEDICINE

## 2020-09-28 NOTE — PROGRESS NOTES
A full discussion of the nature of anticoagulants has been carried out. A benefit risk analysis has been presented to the patient, so that they understand the justification for choosing anticoagulation at this time. The need for frequent and regular monitoring, precise dosage adjustment and compliance is stressed. Side effects of potential bleeding are discussed. The patient should avoid any OTC items containing aspirin or ibuprofen, and should avoid great swings in general diet. Avoid alcohol consumption. Call if any signs of abnormal bleeding. Next PT/INR test in 2 weeks. Levels better. Continue current dosing regimen.    Patient verbalized understanding      Anticoagulation Summary  As of 2020    INR goal:   2.0-3.0   TTR:   49.7 % (4.2 mo)   INR used for dosin.5 (2020)   Warfarin maintenance plan:   2.5 mg (2.5 mg x 1) every Mon, Wed, Fri; 3.5 mg (2.5 mg x 1 and 1 mg x 1) all other days   Weekly warfarin total:   21.5 mg   Plan last modified:   Jerson Sumner RN (2020)   Next INR check:   10/12/2020   Target end date:       Indications    Chronic deep vein thrombosis (DVT) of lower extremity ([facility] [address] [I82.509]  Long term (current) use of anticoagulants [Z79.01]             Anticoagulation Episode Summary     INR check location:       Preferred lab:       Send INR reminders to:       Comments:         Anticoagulation Care Providers     Provider Role Specialty Phone number    Juliocesar Lofton MD [facility] [phone]          Future Appointments   Date Time Provider Corinna Johnston   2020 10:00 AM MD FATEMEH Thomas BS AMB   10/7/2020 10:15 AM LAB ONLY FATEMEH HICKEY AMB   10/12/2020  9:00 AM VERENICE ESTRADA AMB   2021 10:20 AM MD ORBLES Mccormick BS AMB

## 2020-09-30 ENCOUNTER — OFFICE VISIT (OUTPATIENT)
Dept: FAMILY MEDICINE CLINIC | Age: 80
End: 2020-09-30
Payer: MEDICARE

## 2020-09-30 VITALS
SYSTOLIC BLOOD PRESSURE: 118 MMHG | BODY MASS INDEX: 27.69 KG/M2 | HEART RATE: 63 BPM | WEIGHT: 193.4 LBS | DIASTOLIC BLOOD PRESSURE: 72 MMHG | HEIGHT: 70 IN | TEMPERATURE: 97.6 F | RESPIRATION RATE: 16 BRPM | OXYGEN SATURATION: 98 %

## 2020-09-30 DIAGNOSIS — Z99.89 OSA ON CPAP: ICD-10-CM

## 2020-09-30 DIAGNOSIS — G31.84 MILD COGNITIVE IMPAIRMENT: ICD-10-CM

## 2020-09-30 DIAGNOSIS — M25.559 HIP PAIN: ICD-10-CM

## 2020-09-30 DIAGNOSIS — G47.33 OSA ON CPAP: ICD-10-CM

## 2020-09-30 DIAGNOSIS — Z00.00 MEDICARE ANNUAL WELLNESS VISIT, SUBSEQUENT: Primary | ICD-10-CM

## 2020-09-30 DIAGNOSIS — D33.3 ACOUSTIC NEUROMA (HCC): ICD-10-CM

## 2020-09-30 DIAGNOSIS — Z13.39 SCREENING FOR ALCOHOLISM: ICD-10-CM

## 2020-09-30 DIAGNOSIS — Z13.31 SCREENING FOR DEPRESSION: ICD-10-CM

## 2020-09-30 PROCEDURE — G0439 PPPS, SUBSEQ VISIT: HCPCS | Performed by: FAMILY MEDICINE

## 2020-09-30 PROCEDURE — G0444 DEPRESSION SCREEN ANNUAL: HCPCS | Performed by: FAMILY MEDICINE

## 2020-09-30 NOTE — PROGRESS NOTES
Chief Complaint   Patient presents with   [place] Annual Wellness Visit     1. Have you been to the ER, urgent care clinic since your last visit? Hospitalized since your last visit? No    2. Have you seen or consulted any other health care providers outside of the [address] [place] [address] since your last visit? Include any pap smears or colon screening.  Yes Where: [facility] for L hip pain and doing PT       Will get shingles and td@ pharmacy  And discuss flu with MD

## 2020-09-30 NOTE — PATIENT INSTRUCTIONS
Medicare Wellness Visit, Male The best way to live healthy is to have a lifestyle where you eat a well-balanced diet, exercise regularly, limit alcohol use, and quit all forms of tobacco/nicotine, if applicable. Regular preventive services are another way to keep healthy. Preventive services (vaccines, screening tests, monitoring & exams) can help personalize your care plan, which helps you manage your own care. Screening tests can find health problems at the earliest stages, when they are easiest to treat. [facility] follows the current, evidence-based guidelines published by the [facility] ([facility]) when recommending preventive services for our patients. Because we follow these guidelines, sometimes recommendations change over time as research supports it. (For example, a prostate screening blood test is no longer routinely recommended for men with no symptoms). Of course, you and your doctor may decide to screen more often for some diseases, based on your risk and co-morbidities (chronic disease you are already diagnosed with). Preventive services for you include: - Medicare offers their members a free annual wellness visit, which is time for you and your primary care provider to discuss and plan for your preventive service needs. Take advantage of this benefit every year! 
-All adults over age 72 should receive the recommended pneumonia vaccines. Current USPSTF guidelines recommend a series of two vaccines for the best pneumonia protection.  
-All adults should have a flu vaccine yearly and tetanus vaccine every 10 years. 
-All adults age 48 and older should receive the shingles vaccines (series of two vaccines).       
-All adults age 38-68 who are overweight should have a diabetes screening test once every three years.  
-Other screening tests & preventive services for persons with diabetes include: an eye exam to screen for diabetic retinopathy, a kidney function test, a foot exam, and stricter control over your cholesterol.  
-Cardiovascular screening for adults with routine risk involves an electrocardiogram (ECG) at intervals determined by the provider.  
-Colorectal cancer screening should be done for adults age 54-65 with no increased risk factors for colorectal cancer. There are a number of acceptable methods of screening for this type of cancer. Each test has its own benefits and drawbacks. Discuss with your provider what is most appropriate for you during your annual wellness visit. The different tests include: colonoscopy (considered the best screening method), a fecal occult blood test, a fecal DNA test, and sigmoidoscopy. 
-All adults born between [facility] should be screened once for Hepatitis C. 
-An Abdominal Aortic Aneurysm (AAA) Screening is recommended for men age 73-68 who has ever smoked in their lifetime. Here is a list of your current Health Maintenance items (your personalized list of preventive services) with a due date: 
Health Maintenance Due Topic Date Due  Pneumococcal Vaccine (1 of 1 - PPSV23) 10/10/2007 [address] [place] [address] Annual Well Visit  02/14/2020  Cholesterol Test   08/14/2020  Yearly Flu Vaccine (1) 09/01/2020

## 2020-09-30 NOTE — PROGRESS NOTES
This is the Subsequent Medicare Annual Wellness Exam, performed 12 months or more after the Initial AWV or the last Subsequent AWV    I have reviewed the patient's medical history in detail and updated the computerized patient record. Patient reports having some mild left hip pain. He was evaluated by ortho who performed xray showing arthritis and recommended PT. He is doing PT with [facility]. He reports that it is helping with stretching and muscle strengthening. He states that his pain level is around 1/10. He endorses having strong family history of osteoarthritis. Patient reports that he has been a little more forgetful. He reports having testing with a neurologist in [facility] 7-8 years ago that was normal. He does endorse feeling a little slower and occasionally having problems with names. He denies getting lost while driving or any changes in sight/smell/taste. ISABELLA: Her reports that he is compliant with CPAP. He is followed by Dr. 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    Atrial fibrillation ([address] I48.91    Long term (current) use of anticoagulants Z79.01     Past Medical History:   Diagnosis Date    Acoustic neuroma ([address] 2/18/2020    Chronic deep vein thrombosis (DVT) of lower extremity (HCC) 2/14/2020    1.8 to 2.5 is goal    Hypercholesteremia 2/14/2020 Lipitor 20mg    ISABELLA on CPAP 2/18/2020    Paroxysmal tachycardia, unspecified (HCC) 2/14/2020    Sotalol Saw Naya Hendricks MD       Past Surgical History:   Procedure Laterality Date    HX COLONOSCOPY      hx of GI bleed    HX HERNIA REPAIR      HX ORTHOPAEDIC      lerft shoulder      Current Outpatient Medications   Medication Sig Dispense Refill    PARoxetine (PaxiL) 20 mg tablet Take 2 Tabs by mouth every evening. (Patient taking differently: Take 20 mg by mouth two (2) times a day.) 180 Tab 1    atorvastatin (LIPITOR) 10 mg tablet Take 1 Tab by mouth daily. 90 Tab 1    Comp. Stocking,Knee,Regular,Lrg (Relief Knee Open Toe Stocking) misc Use daily for lower extremity swelling, Size large 30-40mm, Model # [id number] 2 Each 1    sotaloL (BETAPACE) 80 mg tablet Take 1 Tab by mouth two (2) times a day. 180 Tab 1    warfarin (Coumadin) 2.5 mg tablet 2.5 mg (2.5 mg x 1) every Mon, Wed, Fri; 3.5 mg (2.5 mg x 1 and 1 mg x 1) all other days (Patient taking differently: 3.5 mg daily except M/W/F take 2.5 mg) 90 Tab 1    warfarin (COUMADIN) 1 mg tablet 2.5 mg (2.5 mg x 1) every Mon, Wed, Fri; 3.5 mg (2.5 mg x 1 and 1 mg x 1) all other days (Patient taking differently: 3.5 mg daily except M/W/F take 2.5 mg) 48 Tab 1    aspirin delayed-release 81 mg tablet Take  by mouth daily.  iron 18 mg tab Take  by mouth.  ascorbic acid, vitamin C, (VITAMIN C) 500 mg tablet Take  by mouth.  gluc case/chondro case A/vit C/Mn (GLUCOSAMINE 1500 COMPLEX PO) Take  by mouth.  vitamin e (E GEMS) 1,000 unit capsule Take 1,000 Units by mouth daily.  thiamine HCL (VITAMIN B-1) 100 mg tablet Take  by mouth daily.  folic acid (FOLVITE) 1 mg tablet Take  by mouth daily.  cholecalciferol (VITAMIN D3) (2,000 UNITS /50 MCG) cap capsule Take 2,000 Units by mouth daily.  magnesium 250 mg tab Take  by mouth.  calcium carbonate (CALCIUM 500 PO) Take  by mouth.  turmeric (CURCUMIN) by Does Not Apply route. Allergies   Allergen Reactions    Sulfa (Sulfonamide Antibiotics) Rash       No family history on file. Social History     Tobacco Use    Smoking status: Former Smoker     Types: Pipe    Smokeless tobacco: Never Used   Substance Use Topics    Alcohol use: Yes     Drinks per session: 1 or 2     Binge frequency: Never       Depression Risk Factor Screening:     3 most recent PHQ Screens 9/30/2020   Little interest or pleasure in doing things Not at all   Feeling down, depressed, irritable, or hopeless Not at all   Total Score PHQ 2 0       Alcohol Risk Screen   Do you average more than 1 drink per night or more than 7 drinks a week: about 1-2 per night    In the past three months have you have had more than 4 drinks containing alcohol on one occasion: No        Functional Ability and Level of Safety:   Hearing: followed by ENT     Activities of Daily Living: The home contains: good lighting  Patient does total self care     Ambulation: with mild difficulty     Fall Risk:  Fall Risk Assessment, last 12 mths 9/30/2020   Able to walk? Yes   Fall in past 12 months? No     Abuse Screen:  Patient is not abused       Cognitive Screening   Has your family/caregiver stated any concerns about your memory: yes - wife with some concerns about memory     Cognitive Screening: Normal - MMSE (Mini Mental Status Exam) 29/30 (see media)    Patient Care Team   Patient Care Team:  Tyler Acosta MD as PCP - General (Family Medicine)  Tyler Acosta MD as PCP - [facility]  Sil Arciniega MD as Physician (Otolaryngology)    Assessment/Plan   Education and counseling provided:  Are appropriate based on today's review and evaluation      ICD-10-CM ICD-9-CM    1. Medicare annual wellness visit, subsequent  Z00.00 V70.0    2. Mild cognitive impairment  G31.84 331.83    3. Hip pain  M25.559 719.45    4. ISABELLA on CPAP  G47.33 327.23     Z99.89 V46.8    5. Screening for alcoholism  Z13.39 V79.1    6.  Screening for depression  Z13.31 V79.0 DEPRESSION SCREEN ANNUAL   7. Acoustic neuroma (HCC)  D33.3 225.1      · Medicare Wellness Exam: Reviewed and addressed patient's medical history and concerns as discussed in note. Reviewed recommended screenings and immunizations. Discussed recommendations for diet, exercise, and lifestyle. · All conditions listed above: chronic and stable. Will continue current treatment regimen. Will check labs as reflected above. Discussed following up with specialist as scheduled. Discussed recommendations for diet and exercise.          Health Maintenance Due   Topic Date Due    Pneumococcal 65+ years (1 of 1 - PPSV23) 10/10/2007    Medicare Yearly Exam  02/14/2020    Lipid Screen  08/14/2020    Flu Vaccine (1) 09/01/2020

## 2020-10-05 PROBLEM — G31.84 MILD COGNITIVE IMPAIRMENT: Status: ACTIVE | Noted: 2020-10-05

## 2020-11-10 ENCOUNTER — ANTI-COAG VISIT (OUTPATIENT)
Dept: CARDIOLOGY CLINIC | Age: 80
End: 2020-11-10
Payer: MEDICARE

## 2020-11-10 DIAGNOSIS — I82.5Z9 CHRONIC DEEP VEIN THROMBOSIS (DVT) OF DISTAL VEIN OF LOWER EXTREMITY, UNSPECIFIED LATERALITY (HCC): ICD-10-CM

## 2020-11-10 DIAGNOSIS — Z79.01 LONG TERM (CURRENT) USE OF ANTICOAGULANTS: ICD-10-CM

## 2020-11-10 LAB
INR BLD: 2.5
PT POC: NORMAL
VALID INTERNAL CONTROL?: YES

## 2020-11-10 PROCEDURE — 85610 PROTHROMBIN TIME: CPT | Performed by: INTERNAL MEDICINE

## 2020-11-10 NOTE — PROGRESS NOTES
A full discussion of the nature of anticoagulants has been carried out. A benefit risk analysis has been presented to the patient, so that they understand the justification for choosing anticoagulation at this time. The need for frequent and regular monitoring, precise dosage adjustment and compliance is stressed. Side effects of potential bleeding are discussed. The patient should avoid any OTC items containing aspirin or ibuprofen, and should avoid great swings in general diet. Avoid alcohol consumption. Call if any signs of abnormal bleeding. Next PT/INR test in 1 month. Continue current dosing regimen.    Patient verbalized understanding      Anticoagulation Summary  As of 11/10/2020    INR goal:   2.0-3.0   TTR:   62.5 % (5.7 mo)   INR used for dosin.5 (11/10/2020)   Warfarin maintenance plan:   2.5 mg (2.5 mg x 1) every Mon, Wed, Fri; 3.5 mg (2.5 mg x 1 and 1 mg x 1) all other days   Weekly warfarin total:   21.5 mg   Plan last modified:   Guicho Collins RN (2020)   Next INR check:   2020   Target end date:       Indications    Chronic deep vein thrombosis (DVT) of lower extremity ([facility] [I82.509]  Long term (current) use of anticoagulants [Z79.01]             Anticoagulation Episode Summary     INR check location:       Preferred lab:       Send INR reminders to:       Comments:         Anticoagulation Care Providers     Provider Role Specialty Phone number    Xena Nugent MD [facility] [phone]          Future Appointments   Date Time Provider Corinna Johnston   2020  9:40 AM VERENICE ESTRADA AMB   2021 10:20 AM MD ROBLES Packer AMB

## 2020-11-11 ENCOUNTER — TELEPHONE (OUTPATIENT)
Dept: FAMILY MEDICINE CLINIC | Age: 80
End: 2020-11-11

## 2020-11-11 LAB — TSH SERPL DL<=0.05 MIU/L-ACNC: 1.57 UIU/ML (ref 0.36–3.74)

## 2020-11-11 NOTE — TELEPHONE ENCOUNTER
Spoke to Lindsey Gracia. She states that they can't do the TSH reflex to T4. They can do the TSH reflex to freeT4. Advised that is fine. Lindsey Gracia said that she would need to have an add on order sent to her. Let her know that I don't have one. She will fax that form to us.     I put form on your desk to sign

## 2020-11-11 NOTE — TELEPHONE ENCOUNTER
Anabelle del real/  [facility] lab would like a call back from the nurse to discuss the pt      BCB# [phone]

## 2020-11-11 NOTE — PROGRESS NOTES
Please let patient know that his uric acid (lab for gout) is elevated and hemoglobin (oxygen carrying cells) is a little low. Please schedule for virtual visit or clinic visit to discuss additional recommendations for both.

## 2020-11-12 ENCOUNTER — PATIENT MESSAGE (OUTPATIENT)
Dept: FAMILY MEDICINE CLINIC | Age: 80
End: 2020-11-12

## 2020-11-16 LAB
ALBUMIN SERPL-MCNC: 3.1 G/DL (ref 3.5–5)
ALBUMIN/GLOB SERPL: 1 {RATIO} (ref 1.1–2.2)
ALP SERPL-CCNC: 83 U/L (ref 45–117)
ALT SERPL-CCNC: 26 U/L (ref 12–78)
ANION GAP SERPL CALC-SCNC: 4 MMOL/L (ref 5–15)
AST SERPL-CCNC: 21 U/L (ref 15–37)
BASOPHILS # BLD: 0 K/UL (ref 0–0.1)
BASOPHILS NFR BLD: 1 % (ref 0–1)
BILIRUB SERPL-MCNC: 0.7 MG/DL (ref 0.2–1)
BUN SERPL-MCNC: 15 MG/DL (ref 6–20)
BUN/CREAT SERPL: 21 (ref 12–20)
CALCIUM SERPL-MCNC: 8.7 MG/DL (ref 8.5–10.1)
CHLORIDE SERPL-SCNC: 106 MMOL/L (ref 97–108)
CHOLEST SERPL-MCNC: 140 MG/DL
CO2 SERPL-SCNC: 29 MMOL/L (ref 21–32)
COMMENT, HOLDF: NORMAL
CREAT SERPL-MCNC: 0.71 MG/DL (ref 0.7–1.3)
DIFFERENTIAL METHOD BLD: ABNORMAL
EOSINOPHIL # BLD: 0.1 K/UL (ref 0–0.4)
EOSINOPHIL NFR BLD: 1 % (ref 0–7)
ERYTHROCYTE [DISTWIDTH] IN BLOOD BY AUTOMATED COUNT: 12.5 % (ref 11.5–14.5)
GLOBULIN SER CALC-MCNC: 3.1 G/DL (ref 2–4)
GLUCOSE SERPL-MCNC: 125 MG/DL (ref 65–100)
HCT VFR BLD AUTO: 35.4 % (ref 36.6–50.3)
HDLC SERPL-MCNC: 61 MG/DL
HDLC SERPL: 2.3 {RATIO} (ref 0–5)
HGB BLD-MCNC: 11.3 G/DL (ref 12.1–17)
IMM GRANULOCYTES # BLD AUTO: 0 K/UL (ref 0–0.04)
IMM GRANULOCYTES NFR BLD AUTO: 1 % (ref 0–0.5)
LDLC SERPL CALC-MCNC: 64.8 MG/DL (ref 0–100)
LIPID PROFILE,FLP: NORMAL
LYMPHOCYTES # BLD: 0.9 K/UL (ref 0.8–3.5)
LYMPHOCYTES NFR BLD: 13 % (ref 12–49)
MAGNESIUM SERPL-MCNC: 2.1 MG/DL (ref 1.6–2.4)
MCH RBC QN AUTO: 32.8 PG (ref 26–34)
MCHC RBC AUTO-ENTMCNC: 31.9 G/DL (ref 30–36.5)
MCV RBC AUTO: 102.9 FL (ref 80–99)
MONOCYTES # BLD: 0.9 K/UL (ref 0–1)
MONOCYTES NFR BLD: 14 % (ref 5–13)
NEUTS SEG # BLD: 4.7 K/UL (ref 1.8–8)
NEUTS SEG NFR BLD: 70 % (ref 32–75)
NRBC # BLD: 0 K/UL (ref 0–0.01)
NRBC BLD-RTO: 0 PER 100 WBC
PLATELET # BLD AUTO: 228 K/UL (ref 150–400)
PMV BLD AUTO: 10.3 FL (ref 8.9–12.9)
POTASSIUM SERPL-SCNC: 4.9 MMOL/L (ref 3.5–5.1)
PROT SERPL-MCNC: 6.2 G/DL (ref 6.4–8.2)
RBC # BLD AUTO: 3.44 M/UL (ref 4.1–5.7)
SAMPLES BEING HELD,HOLD: NORMAL
SODIUM SERPL-SCNC: 139 MMOL/L (ref 136–145)
T4 FREE SERPL-MCNC: 1.1 NG/DL (ref 0.8–1.5)
TRIGL SERPL-MCNC: 71 MG/DL (ref ?–150)
URATE SERPL-MCNC: 7.5 MG/DL (ref 3.5–7.2)
VLDLC SERPL CALC-MCNC: 14.2 MG/DL
WBC # BLD AUTO: 6.5 K/UL (ref 4.1–11.1)

## 2020-11-18 ENCOUNTER — VIRTUAL VISIT (OUTPATIENT)
Dept: FAMILY MEDICINE CLINIC | Age: 80
End: 2020-11-18
Payer: MEDICARE

## 2020-11-18 DIAGNOSIS — E79.0 ELEVATED URIC ACID IN BLOOD: ICD-10-CM

## 2020-11-18 DIAGNOSIS — M25.50 MULTIPLE JOINT PAIN: ICD-10-CM

## 2020-11-18 DIAGNOSIS — D53.9 MACROCYTIC ANEMIA: Primary | ICD-10-CM

## 2020-11-18 PROCEDURE — 99213 OFFICE O/P EST LOW 20 MIN: CPT | Performed by: FAMILY MEDICINE

## 2020-11-18 PROCEDURE — G0463 HOSPITAL OUTPT CLINIC VISIT: HCPCS | Performed by: FAMILY MEDICINE

## 2020-11-18 NOTE — PROGRESS NOTES
Shalini Gutierrez  68 y.o. male  1940  [facility] [address]  [id number]     [address] [facility]       Encounter Date: 11/18/2020           Established Patient Visit Note: Vickie Anne MD    Reason for Appointment:  Chief Complaint   Patient presents with    Follow-up       History of Present Illness:  History provided by patient    Shalini Gutierrez is a 68 y.o. male who presents today for:    Recent labs showed low Hgb of 11.3 with MCV of 102.9. He endorses having one drink per night and two drinks on the weekend  His is on coumadin for hx of DVT (monitored by cardiology)  He reports hip and shoulder stiff in the morning. Followed by Sanju Edmondson (orthopedics). He has completed PT. Gout: Elevated Uric Acid. He has been on Allopurinol in past at 50mg dosing. Review of Systems  Review of Systems   Constitutional: Negative for chills and fever. Respiratory: Negative for cough, shortness of breath and wheezing. Cardiovascular: Negative for chest pain and palpitations. Allergies: Sulfa (sulfonamide antibiotics)    Medications: (Updated to reflect final medication list after visit)    Current Outpatient Medications:     warfarin (COUMADIN) 1 mg tablet, 3.5 mg daily except M/W/F take 2.5 mg, Disp: 48 Tab, Rfl: 1    warfarin (Coumadin) 2.5 mg tablet, 3.5 mg daily except M/W/F take 2.5 mg, Disp: 90 Tab, Rfl: 1    PARoxetine (PaxiL) 20 mg tablet, Take 2 Tabs by mouth every evening. (Patient taking differently: Take 20 mg by mouth two (2) times a day.), Disp: 180 Tab, Rfl: 1    atorvastatin (LIPITOR) 10 mg tablet, Take 1 Tab by mouth daily. , Disp: 90 Tab, Rfl: 1    Comp. Stocking,Knee,Regular,Lrg (Relief Knee Open Toe Stocking) misc, Use daily for lower extremity swelling, Size large 30-40mm, Model # [id number], Disp: 2 Each, Rfl: 1    sotaloL (BETAPACE) 80 mg tablet, Take 1 Tab by mouth two (2) times a day., Disp: 180 Tab, Rfl: 1    aspirin delayed-release 81 mg tablet, Take  by mouth daily. , Disp: , Rfl:     iron 18 mg tab, Take  by mouth., Disp: , Rfl:     ascorbic acid, vitamin C, (VITAMIN C) 500 mg tablet, Take  by mouth., Disp: , Rfl:     gluc case/chondro case A/vit C/Mn (GLUCOSAMINE 1500 COMPLEX PO), Take  by mouth., Disp: , Rfl:     vitamin e (E GEMS) 1,000 unit capsule, Take 1,000 Units by mouth daily. , Disp: , Rfl:     thiamine HCL (VITAMIN B-1) 100 mg tablet, Take  by mouth daily. , Disp: , Rfl:     folic acid (FOLVITE) 1 mg tablet, Take  by mouth daily. , Disp: , Rfl:     cholecalciferol (VITAMIN D3) (2,000 UNITS /50 MCG) cap capsule, Take 2,000 Units by mouth daily. , Disp: , Rfl:     magnesium 250 mg tab, Take  by mouth., Disp: , Rfl:     calcium carbonate (CALCIUM 500 PO), Take  by mouth., Disp: , Rfl:     turmeric (CURCUMIN), by Does Not Apply route., Disp: , Rfl:     History  Patient Care Team:  Lynne Roberts MD as PCP - General (Family Medicine)  Lynne Roberts MD as PCP - [address] [place] [address]  [facility] Provider  Alysia De Jesus MD as Physician (Otolaryngology)    Past Medical History: he has a past medical history of Acoustic neuroma ([facility] [address] [address] (2/18/2020), Chronic deep vein thrombosis (DVT) of lower extremity ([facility] [address] (2/14/2020), Hypercholesteremia (2/14/2020), ISABELLA on CPAP (2/18/2020), and Paroxysmal tachycardia, unspecified ([facility] [address] (2/14/2020). Past Surgical History: he has a past surgical history that includes hx orthopaedic; hx colonoscopy; and hx hernia repair. Family Medical History: family history is not on file. Social History: he reports that he has quit smoking. His smoking use included pipe. He has never used smokeless tobacco. He reports current alcohol use. He reports that he does not use drugs. Objective:     Physical Exam  Constitutional:       General: He is not in acute distress. Appearance: Normal appearance. He is not ill-appearing or toxic-appearing. HENT:      Head: Normocephalic.       Right Ear: External ear normal. Left Ear: External ear normal.      Nose: Nose normal.      Mouth/Throat:      Mouth: Mucous membranes are moist.   Eyes:      General: No scleral icterus. Right eye: No discharge. Left eye: No discharge. Extraocular Movements: Extraocular movements intact. Conjunctiva/sclera: Conjunctivae normal.   Neck:      Musculoskeletal: Normal range of motion. Pulmonary:      Effort: Pulmonary effort is normal. No respiratory distress. Neurological:      Mental Status: He is alert and oriented to person, place, and time. Mental status is at baseline. Psychiatric:         Mood and Affect: Mood normal.         Behavior: Behavior normal.         Thought Content: Thought content normal.         Judgment: Judgment normal.         Assessment & Plan:      ICD-10-CM ICD-9-CM    1. Macrocytic anemia  D53.9 281.9 PERIPHERAL SMEAR      VITAMIN B12 & FOLATE      METHYLMALONIC ACID      CBC WITH AUTOMATED DIFF   3. Elevated uric acid in blood  E79.0 790.6 URIC ACID   4. Multiple joint pain  M25.50 719.49 BELEM BY MULTIPLEX FLOW IA, QL     Macrocytic Anemia: Will evaluate further with labs. Elevated Uric Acid: Will recheck uric acid and consider addition of allopurinol if it remains elevated. Multiple Joint Pain: Will check BELEM    I was in the office while conducting this encounter. Consent:  He and/or his healthcare decision maker is aware that this patient-initiated Telehealth encounter is a billable service, with coverage as determined by his insurance carrier. He is aware that he may receive a bill and has provided verbal consent to proceed: Yes    This virtual visit was conducted via [employer].   Pursuant to the emergency declaration under the [facility], [address] [place] [address] authority and the [employer] Act, this Virtual  Visit was conducted to reduce the patient's risk of exposure to COVID-19 and provide continuity of care for an established patient. Services were provided through a video synchronous discussion virtually to substitute for in-person clinic visit. Due to this being a TeleHealth evaluation, many elements of the physical examination are unable to be assessed. Total Time: minutes: 11-20 minutes. I have discussed the diagnosis with the patient and the intended plan as seen in the above orders. The patient has received an after-visit summary along with patient information handout. I have discussed medication side effects and warnings with the patient as well. Disposition  Follow-up and Dispositions    · Return in about 2 weeks (around 12/2/2020).            Conor Gonzalez MD

## 2020-11-19 DIAGNOSIS — E79.0 ELEVATED URIC ACID IN BLOOD: ICD-10-CM

## 2020-11-19 DIAGNOSIS — D53.9 MACROCYTIC ANEMIA: ICD-10-CM

## 2020-11-19 DIAGNOSIS — M25.50 MULTIPLE JOINT PAIN: ICD-10-CM

## 2020-11-21 LAB — ANA SER QL: NEGATIVE

## 2020-11-23 LAB
COMMENT, HOLDF: NORMAL
SAMPLES BEING HELD,HOLD: NORMAL

## 2020-11-24 RX ORDER — WARFARIN 2.5 MG/1
TABLET ORAL
Qty: 90 TAB | Refills: 1 | Status: SHIPPED | OUTPATIENT
Start: 2020-11-24 | End: 2021-05-11 | Stop reason: SDUPTHER

## 2020-11-24 RX ORDER — WARFARIN 1 MG/1
TABLET ORAL
Qty: 48 TAB | Refills: 1 | Status: SHIPPED | OUTPATIENT
Start: 2020-11-24 | End: 2021-05-11 | Stop reason: SDUPTHER

## 2020-11-24 NOTE — TELEPHONE ENCOUNTER
Last visit 11/18/2020 Virtual visit MD Ivan Davies   Next appointment 2 weeks (12/2020)   Previous refill encounter(s)   06/02/2020:   Coumadin 1 mg #48 with 1 refill,  - Coumadin 2.5 mg #90 with 1 refill     Requested Prescriptions     Pending Prescriptions Disp Refills    warfarin (COUMADIN) 1 mg tablet 48 Tab 1     Sig: 3.5 mg daily except M/W/F take 2.5 mg    warfarin (Coumadin) 2.5 mg tablet 90 Tab 1     Sig: 3.5 mg daily except M/W/F take 2.5 mg

## 2020-11-25 ENCOUNTER — TELEPHONE (OUTPATIENT)
Dept: FAMILY MEDICINE CLINIC | Age: 80
End: 2020-11-25

## 2020-11-25 ENCOUNTER — PATIENT MESSAGE (OUTPATIENT)
Dept: FAMILY MEDICINE CLINIC | Age: 80
End: 2020-11-25

## 2020-11-25 DIAGNOSIS — E79.0 ELEVATED URIC ACID IN BLOOD: Primary | ICD-10-CM

## 2020-11-25 RX ORDER — ALLOPURINOL 100 MG/1
50 TABLET ORAL DAILY
Qty: 45 TAB | Refills: 0 | Status: SHIPPED | OUTPATIENT
Start: 2020-11-25 | End: 2021-02-24 | Stop reason: SDUPTHER

## 2020-11-25 NOTE — TELEPHONE ENCOUNTER
Called patient regarding lab results. Will start allopurinol, and patient agrees to closely monitor INR with cardiology. Will follow up with patient in clinic to discuss further.      Geno Dc MD

## 2020-11-25 NOTE — TELEPHONE ENCOUNTER
----- Message from Scotty Braswell sent at 11/25/2020  9:11 AM EST -----  Regarding: Dr. Margaret Bowen Message/Vendor Calls    Caller's first and last name: Devon Thompson with [address]      Reason for call: Pt was prescribed Warfarin. Pt is also taking Paroxetine which is showing a drug interaction. Requesting a call back to get ok to have patient to both medications.        Callback required yes/no and why: yes      Best contact number(s): [phone]      Details to clarify the request: [facility]

## 2020-11-25 NOTE — TELEPHONE ENCOUNTER
From: Iza Yoo  To: Valente Ugalde MD  Sent: 11/25/2020 8:26 AM EST  Subject: Prescription Question    Hello again, Dr. Akbar Roche. Can you call in a prescription to [facility] for allopurinol, 100 mg. tablets? If so, I will plan on splitting the tablets and taking a half each morning, as I have done in the past to control uric acid. Thanks as always for your help!  Evan, JOSE LUIS

## 2020-11-27 LAB
BASOPHILS # BLD: 0 K/UL (ref 0–0.1)
BASOPHILS NFR BLD: 0 % (ref 0–1)
DIFFERENTIAL METHOD BLD: ABNORMAL
EOSINOPHIL # BLD: 0 K/UL (ref 0–0.4)
EOSINOPHIL NFR BLD: 1 % (ref 0–7)
ERYTHROCYTE [DISTWIDTH] IN BLOOD BY AUTOMATED COUNT: 12.4 % (ref 11.5–14.5)
FOLATE SERPL-MCNC: 64.1 NG/ML (ref 5–21)
HCT VFR BLD AUTO: 36 % (ref 36.6–50.3)
HGB BLD-MCNC: 11.4 G/DL (ref 12.1–17)
IMM GRANULOCYTES # BLD AUTO: 0.1 K/UL (ref 0–0.04)
IMM GRANULOCYTES NFR BLD AUTO: 1 % (ref 0–0.5)
LYMPHOCYTES # BLD: 0.8 K/UL (ref 0.8–3.5)
LYMPHOCYTES NFR BLD: 10 % (ref 12–49)
Lab: NORMAL
MCH RBC QN AUTO: 32.2 PG (ref 26–34)
MCHC RBC AUTO-ENTMCNC: 31.7 G/DL (ref 30–36.5)
MCV RBC AUTO: 101.7 FL (ref 80–99)
METHYLMALONATE SERPL-SCNC: 235 NMOL/L (ref 0–378)
MONOCYTES # BLD: 0.9 K/UL (ref 0–1)
MONOCYTES NFR BLD: 12 % (ref 5–13)
NEUTS SEG # BLD: 6.1 K/UL (ref 1.8–8)
NEUTS SEG NFR BLD: 76 % (ref 32–75)
NRBC # BLD: 0 K/UL (ref 0–0.01)
NRBC BLD-RTO: 0 PER 100 WBC
PERIPHERAL SMEAR,PSM: NORMAL
PLATELET # BLD AUTO: 330 K/UL (ref 150–400)
PMV BLD AUTO: 9.8 FL (ref 8.9–12.9)
RBC # BLD AUTO: 3.54 M/UL (ref 4.1–5.7)
URATE SERPL-MCNC: 6 MG/DL (ref 3.5–7.2)
VIT B12 SERPL-MCNC: 382 PG/ML (ref 193–986)
WBC # BLD AUTO: 7.9 K/UL (ref 4.1–11.1)

## 2020-11-30 ENCOUNTER — TELEPHONE (OUTPATIENT)
Dept: FAMILY MEDICINE CLINIC | Age: 80
End: 2020-11-30

## 2020-11-30 NOTE — TELEPHONE ENCOUNTER
Called pt to get him set up for an appt. He thought he had an appt already scheduled. I have set him up for VV for 12/3 @ 8:00. He reports that he had a covid test on Sat @ [facility].  He said that Dr Donna Navas recommended it. He was told that it would be 3-4 days before he got results. He will check into those results before his Thursday appt.

## 2020-11-30 NOTE — TELEPHONE ENCOUNTER
----- Message from Alma Sanford sent at 11/25/2020 10:54 AM EST -----  Regarding: Reilly Francois/telephone  Contact: [phone]  Appointment not available    Caller's first and last name and relationship to patient (if not the patient):      Best contact number: [phone]      Preferred date and time:virtual visit for 12/3/20      Scheduled appointment date and time:      Reason for appointment:f/up visit for Thursday December 3,2020. However, Epic gives me a red warning. Please call pt to schedule.  thanks      Details to clarify the request:      Alma Sanford

## 2020-12-03 ENCOUNTER — VIRTUAL VISIT (OUTPATIENT)
Dept: FAMILY MEDICINE CLINIC | Age: 80
End: 2020-12-03
Payer: MEDICARE

## 2020-12-03 DIAGNOSIS — R53.83 FATIGUE, UNSPECIFIED TYPE: ICD-10-CM

## 2020-12-03 DIAGNOSIS — M25.50 MULTIPLE JOINT PAIN: ICD-10-CM

## 2020-12-03 DIAGNOSIS — D53.9 MACROCYTIC ANEMIA: Primary | ICD-10-CM

## 2020-12-03 PROCEDURE — G0463 HOSPITAL OUTPT CLINIC VISIT: HCPCS | Performed by: FAMILY MEDICINE

## 2020-12-03 PROCEDURE — 99213 OFFICE O/P EST LOW 20 MIN: CPT | Performed by: FAMILY MEDICINE

## 2020-12-03 NOTE — PROGRESS NOTES
Iza Yoo  68 y.o. male  1940  [place] [address]  [id number]     [facility]       Encounter Date: 12/3/2020           Established Patient Visit Note: Valente Ugalde MD    Reason for Appointment:  Chief Complaint   Patient presents with    Fatigue       History of Present Illness:  History provided by patient    Iza Yoo is a 68 y.o. male who presents today for follow up of fatigue and shoulder/hip ache    Patient reports that the fatigue is a little better  He states that over the weekend he found a bottle of celebrex and took a few of them. He states that it helped with a lot of the stiffness. He states that this has allowed him to sleep better, but he continues to have some fatigue and soreness. He reports that he was tested for covid at [facility],which was negative  Left hip and left shoulder have been aching, he also endorses having neck stiffeness. He reports that he tried taking tylenol 2 tylenol 3 times per day but it did not seem to help    Review of Systems  Review of Systems   Constitutional: Negative for chills and fever. Respiratory: Negative for cough, shortness of breath and wheezing. Cardiovascular: Negative for chest pain and palpitations. Allergies: Sulfa (sulfonamide antibiotics)    Medications: (Updated to reflect final medication list after visit)    Current Outpatient Medications:     allopurinoL (ZYLOPRIM) 100 mg tablet, Take 0.5 Tabs by mouth daily. , Disp: 45 Tab, Rfl: 0    warfarin (COUMADIN) 1 mg tablet, 3.5 mg daily except M/W/F take 2.5 mg, Disp: 48 Tab, Rfl: 1    warfarin (Coumadin) 2.5 mg tablet, 3.5 mg daily except M/W/F take 2.5 mg, Disp: 90 Tab, Rfl: 1    PARoxetine (PaxiL) 20 mg tablet, Take 2 Tabs by mouth every evening. (Patient taking differently: Take 20 mg by mouth two (2) times a day.), Disp: 180 Tab, Rfl: 1    atorvastatin (LIPITOR) 10 mg tablet, Take 1 Tab by mouth daily. , Disp: 90 Tab, Rfl: 1    Comp. Stocking,Knee,Regular,Lrg (Relief Knee Open Toe Stocking) misc, Use daily for lower extremity swelling, Size large 30-40mm, Model # [id number], Disp: 2 Each, Rfl: 1    sotaloL (BETAPACE) 80 mg tablet, Take 1 Tab by mouth two (2) times a day., Disp: 180 Tab, Rfl: 1    aspirin delayed-release 81 mg tablet, Take  by mouth daily. , Disp: , Rfl:     iron 18 mg tab, Take  by mouth., Disp: , Rfl:     ascorbic acid, vitamin C, (VITAMIN C) 500 mg tablet, Take  by mouth., Disp: , Rfl:     gluc case/chondro case A/vit C/Mn (GLUCOSAMINE 1500 COMPLEX PO), Take  by mouth., Disp: , Rfl:     vitamin e (E GEMS) 1,000 unit capsule, Take 1,000 Units by mouth daily. , Disp: , Rfl:     thiamine HCL (VITAMIN B-1) 100 mg tablet, Take  by mouth daily. , Disp: , Rfl:     folic acid (FOLVITE) 1 mg tablet, Take  by mouth daily. , Disp: , Rfl:     cholecalciferol (VITAMIN D3) (2,000 UNITS /50 MCG) cap capsule, Take 2,000 Units by mouth daily. , Disp: , Rfl:     magnesium 250 mg tab, Take  by mouth., Disp: , Rfl:     calcium carbonate (CALCIUM 500 PO), Take  by mouth., Disp: , Rfl:     turmeric (CURCUMIN), by Does Not Apply route., Disp: , Rfl:     History  Patient Care Team:  Arnie Fernando MD as PCP - General (Family Medicine)  Arnie Fernando MD as PCP - [address] [place] [address] [facility]  Pierre Powers MD as Physician (Otolaryngology)    Past Medical History: he has a past medical history of Acoustic neuroma ([facility] [address] (2/18/2020), Chronic deep vein thrombosis (DVT) of lower extremity ([facility] [address] (2/14/2020), Hypercholesteremia (2/14/2020), ISABELLA on CPAP (2/18/2020), and Paroxysmal tachycardia, unspecified ([facility] [address] (2/14/2020). Past Surgical History: he has a past surgical history that includes hx orthopaedic; hx colonoscopy; and hx hernia repair. Family Medical History: family history is not on file. Social History: he reports that he has quit smoking. His smoking use included pipe.  He has never used smokeless tobacco. He reports current alcohol use. He reports that he does not use drugs. Objective:     Physical Exam  Constitutional:       General: He is not in acute distress. Appearance: Normal appearance. He is not ill-appearing or toxic-appearing. HENT:      Head: Normocephalic. Right Ear: External ear normal.      Left Ear: External ear normal.      Nose: Nose normal.      Mouth/Throat:      Mouth: Mucous membranes are moist.   Eyes:      General: No scleral icterus. Right eye: No discharge. Left eye: No discharge. Extraocular Movements: Extraocular movements intact. Conjunctiva/sclera: Conjunctivae normal.   Neck:      Musculoskeletal: Normal range of motion. Pulmonary:      Effort: Pulmonary effort is normal. No respiratory distress. Neurological:      Mental Status: He is alert and oriented to person, place, and time. Mental status is at baseline. Psychiatric:         Mood and Affect: Mood normal.         Behavior: Behavior normal.         Thought Content: Thought content normal.         Judgment: Judgment normal.         Assessment & Plan:      ICD-10-CM ICD-9-CM    1. Macrocytic anemia  D53.9 281.9 OCCULT BLOOD IMMUNOASSAY,DIAGNOSTIC      CBC WITH AUTOMATED DIFF   2. Fatigue, unspecified type  R53.83 780.79    3. Multiple joint pain  M25.50 719.49 SED RATE (ESR)      C REACTIVE PROTEIN, QT      RHEUMATOID FACTOR, QT     Fatigue and HIP/Joint Pain in Setting of Anemia: Symptoms of morning stiffness and hip/shoulder pain concerning for autoimune etiology. Will check ESR and CRP to evaluate further. Will also recheck CBC and obtain FIT for anemia. I was in the office while conducting this encounter. Consent:  He and/or his healthcare decision maker is aware that this patient-initiated Telehealth encounter is a billable service, with coverage as determined by his insurance carrier.  He is aware that he may receive a bill and has provided verbal consent to proceed: Yes    This virtual visit was conducted via [employer]. Pursuant to the emergency declaration under the [facility], [facility] waiver authority and the [employer] Act, this Virtual  Visit was conducted to reduce the patient's risk of exposure to COVID-19 and provide continuity of care for an established patient. Services were provided through a video synchronous discussion virtually to substitute for in-person clinic visit. Due to this being a TeleHealth evaluation, many elements of the physical examination are unable to be assessed. Total Time: minutes: 11-20 minutes. I have discussed the diagnosis with the patient and the intended plan as seen in the above orders. The patient has received an after-visit summary along with patient information handout. I have discussed medication side effects and warnings with the patient as well. Disposition  Follow-up and Dispositions    · Return in about 2 weeks (around 12/17/2020).            Vickie Anne MD

## 2020-12-08 ENCOUNTER — ANTI-COAG VISIT (OUTPATIENT)
Dept: CARDIOLOGY CLINIC | Age: 80
End: 2020-12-08
Payer: MEDICARE

## 2020-12-08 ENCOUNTER — PATIENT MESSAGE (OUTPATIENT)
Dept: FAMILY MEDICINE CLINIC | Age: 80
End: 2020-12-08

## 2020-12-08 DIAGNOSIS — M25.50 MULTIPLE JOINT PAIN: ICD-10-CM

## 2020-12-08 DIAGNOSIS — Z79.01 LONG TERM (CURRENT) USE OF ANTICOAGULANTS: ICD-10-CM

## 2020-12-08 DIAGNOSIS — M35.3 POLYMYALGIA RHEUMATICA (HCC): Primary | ICD-10-CM

## 2020-12-08 DIAGNOSIS — I82.5Z9 CHRONIC DEEP VEIN THROMBOSIS (DVT) OF DISTAL VEIN OF LOWER EXTREMITY, UNSPECIFIED LATERALITY (HCC): ICD-10-CM

## 2020-12-08 DIAGNOSIS — D53.9 MACROCYTIC ANEMIA: ICD-10-CM

## 2020-12-08 LAB
BASOPHILS # BLD: 0 K/UL (ref 0–0.1)
BASOPHILS NFR BLD: 0 % (ref 0–1)
CRP SERPL-MCNC: 7.24 MG/DL (ref 0–0.6)
DIFFERENTIAL METHOD BLD: ABNORMAL
EOSINOPHIL # BLD: 0.1 K/UL (ref 0–0.4)
EOSINOPHIL NFR BLD: 1 % (ref 0–7)
ERYTHROCYTE [DISTWIDTH] IN BLOOD BY AUTOMATED COUNT: 12.7 % (ref 11.5–14.5)
ERYTHROCYTE [SEDIMENTATION RATE] IN BLOOD: 70 MM/HR (ref 0–20)
HCT VFR BLD AUTO: 34.8 % (ref 36.6–50.3)
HGB BLD-MCNC: 10.9 G/DL (ref 12.1–17)
IMM GRANULOCYTES # BLD AUTO: 0 K/UL (ref 0–0.04)
IMM GRANULOCYTES NFR BLD AUTO: 0 % (ref 0–0.5)
INR BLD: 2.4
LYMPHOCYTES # BLD: 1 K/UL (ref 0.8–3.5)
LYMPHOCYTES NFR BLD: 12 % (ref 12–49)
MCH RBC QN AUTO: 31.3 PG (ref 26–34)
MCHC RBC AUTO-ENTMCNC: 31.3 G/DL (ref 30–36.5)
MCV RBC AUTO: 100 FL (ref 80–99)
MONOCYTES # BLD: 1 K/UL (ref 0–1)
MONOCYTES NFR BLD: 13 % (ref 5–13)
NEUTS SEG # BLD: 5.9 K/UL (ref 1.8–8)
NEUTS SEG NFR BLD: 74 % (ref 32–75)
NRBC # BLD: 0 K/UL (ref 0–0.01)
NRBC BLD-RTO: 0 PER 100 WBC
PLATELET # BLD AUTO: 324 K/UL (ref 150–400)
PMV BLD AUTO: 9.8 FL (ref 8.9–12.9)
PT POC: 29.2 SECONDS
RBC # BLD AUTO: 3.48 M/UL (ref 4.1–5.7)
RHEUMATOID FACT SERPL-ACNC: <10 IU/ML
VALID INTERNAL CONTROL?: YES
WBC # BLD AUTO: 8 K/UL (ref 4.1–11.1)

## 2020-12-08 PROCEDURE — 85610 PROTHROMBIN TIME: CPT | Performed by: INTERNAL MEDICINE

## 2020-12-08 NOTE — PATIENT INSTRUCTIONS
A full discussion of the nature of anticoagulants has been carried out. A benefit risk analysis has been presented to the patient, so that they understand the justification for choosing anticoagulation at this time. The need for frequent and regular monitoring, precise dosage adjustment and compliance is stressed. Side effects of potential bleeding are discussed. The patient should avoid any OTC items containing aspirin or ibuprofen, and should avoid great swings in general diet. Avoid alcohol consumption. Call if any signs of abnormal bleeding. Next PT/INR test in 1 month. Reports taking celebrex 200 mg daily prn arthritic pain. Does not have current pills on hand. To discuss with Dr. Katz regarding continuing. No change in diet. Discussed if NSAID was to continue, may need to check INR and dose coumadin accordingly. Verbalized understanding. No change in dosing regimen at this time.

## 2020-12-09 RX ORDER — PREDNISONE 5 MG/1
15 TABLET ORAL DAILY
Qty: 63 TAB | Refills: 0 | Status: SHIPPED | OUTPATIENT
Start: 2020-12-09 | End: 2020-12-28 | Stop reason: SDUPTHER

## 2020-12-09 NOTE — TELEPHONE ENCOUNTER
Called patient and discussed elevated ESR and CRP in setting of recent onset of fatigue, morning stiffness, and hip/shoulder pain. Discussed likely diagnosis of PMR and treatment. Patient agreeable to starting trial of steroids and RTC 2 weeks to evaluate for improvement. Discussed side effects of steroids and  need to closely monitor INR while on steroids. Patient expresses agreement and understanding. Valente Ugalde MD      ICD-10-CM ICD-9-CM    1.  Polymyalgia rheumatica (HCC)  M35.3 725 predniSONE (DELTASONE) 5 mg tablet

## 2020-12-23 RX ORDER — ATORVASTATIN CALCIUM 10 MG/1
10 TABLET, FILM COATED ORAL DAILY
Qty: 90 TAB | Refills: 1 | Status: SHIPPED | OUTPATIENT
Start: 2020-12-23 | End: 2021-06-15 | Stop reason: SDUPTHER

## 2020-12-23 NOTE — TELEPHONE ENCOUNTER
Last Visit: VV 12/3/20  MD Ivan Davies, labs done  Next Appointment: Not scheduled  Previous Refill Encounter(s): 6/17/20 90 + 1    Requested Prescriptions     Pending Prescriptions Disp Refills    atorvastatin (LIPITOR) 10 mg tablet 90 Tab 1     Sig: Take 1 Tab by mouth daily.

## 2020-12-28 ENCOUNTER — PATIENT MESSAGE (OUTPATIENT)
Dept: FAMILY MEDICINE CLINIC | Age: 80
End: 2020-12-28

## 2020-12-28 DIAGNOSIS — E78.00 HYPERCHOLESTEREMIA: ICD-10-CM

## 2020-12-28 DIAGNOSIS — M35.3 POLYMYALGIA RHEUMATICA (HCC): Primary | ICD-10-CM

## 2020-12-28 RX ORDER — PREDNISONE 2.5 MG/1
12.5 TABLET ORAL
Qty: 70 TAB | Refills: 0 | Status: SHIPPED | OUTPATIENT
Start: 2020-12-28 | End: 2021-01-11

## 2021-01-02 ENCOUNTER — HOSPITAL ENCOUNTER (OUTPATIENT)
Dept: LAB | Age: 81
Discharge: HOME | End: 2021-01-02
Payer: MEDICARE

## 2021-01-02 PROCEDURE — 82270 OCCULT BLOOD FECES: CPT

## 2021-01-11 ENCOUNTER — TELEPHONE (OUTPATIENT)
Dept: FAMILY MEDICINE CLINIC | Age: 81
End: 2021-01-11

## 2021-01-11 ENCOUNTER — TRANSCRIBE ORDER (OUTPATIENT)
Dept: REGISTRATION | Age: 81
End: 2021-01-11

## 2021-01-11 DIAGNOSIS — Z01.812 PRE-PROCEDURE LAB EXAM: Primary | ICD-10-CM

## 2021-01-14 LAB — HEMOCCULT STL QL IA: NEGATIVE

## 2021-01-19 ENCOUNTER — VIRTUAL VISIT (OUTPATIENT)
Dept: FAMILY MEDICINE CLINIC | Age: 81
End: 2021-01-19
Payer: MEDICARE

## 2021-01-19 DIAGNOSIS — Z79.01 LONG TERM (CURRENT) USE OF ANTICOAGULANTS: ICD-10-CM

## 2021-01-19 DIAGNOSIS — Z98.890 HX OF MELANOMA EXCISION: ICD-10-CM

## 2021-01-19 DIAGNOSIS — F41.9 ANXIETY: ICD-10-CM

## 2021-01-19 DIAGNOSIS — M35.3 POLYMYALGIA RHEUMATICA (HCC): Primary | ICD-10-CM

## 2021-01-19 DIAGNOSIS — G47.33 OSA ON CPAP: ICD-10-CM

## 2021-01-19 DIAGNOSIS — Z79.01 ANTICOAGULATED ON COUMADIN: ICD-10-CM

## 2021-01-19 DIAGNOSIS — I48.91 ATRIAL FIBRILLATION, UNSPECIFIED TYPE (HCC): ICD-10-CM

## 2021-01-19 DIAGNOSIS — Z85.820 HX OF MELANOMA EXCISION: ICD-10-CM

## 2021-01-19 DIAGNOSIS — I82.5Z9 CHRONIC DEEP VEIN THROMBOSIS (DVT) OF DISTAL VEIN OF LOWER EXTREMITY, UNSPECIFIED LATERALITY (HCC): ICD-10-CM

## 2021-01-19 DIAGNOSIS — D33.3 ACOUSTIC NEUROMA (HCC): ICD-10-CM

## 2021-01-19 DIAGNOSIS — Z99.89 OSA ON CPAP: ICD-10-CM

## 2021-01-19 DIAGNOSIS — M1A.9XX0 CHRONIC GOUT WITHOUT TOPHUS, UNSPECIFIED CAUSE, UNSPECIFIED SITE: ICD-10-CM
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
## 2021-01-19 PROBLEM — M79.89 TOE SWELLING: Status: RESOLVED | Noted: 2020-08-16 | Resolved: 2021-01-19

## 2021-01-19 PROCEDURE — G8427 DOCREV CUR MEDS BY ELIG CLIN: HCPCS | Performed by: FAMILY MEDICINE

## 2021-01-19 PROCEDURE — 99213 OFFICE O/P EST LOW 20 MIN: CPT | Performed by: FAMILY MEDICINE

## 2021-01-19 PROCEDURE — G0463 HOSPITAL OUTPT CLINIC VISIT: HCPCS | Performed by: FAMILY MEDICINE

## 2021-01-19 PROCEDURE — 1101F PT FALLS ASSESS-DOCD LE1/YR: CPT | Performed by: FAMILY MEDICINE

## 2021-01-19 PROCEDURE — G8432 DEP SCR NOT DOC, RNG: HCPCS | Performed by: FAMILY MEDICINE

## 2021-01-19 RX ORDER — ASPIRIN 81 MG/1
81 TABLET ORAL EVERY OTHER DAY
Qty: 1 TAB | Refills: 0
Start: 2021-01-19 | End: 2021-02-09

## 2021-01-19 NOTE — PROGRESS NOTES
Perez Mota  68 y.o. male  1940  [facility]  [id number]     [address] [facility]       Encounter Date: 1/19/2021           Established Patient Visit Note: Nicho Vogt MD    Reason for Appointment:  Chief Complaint   Patient presents with    Follow-up       History of Present Illness:  History provided by patient    Perez Mota is a 68 y.o. male who presents today for:    · PMR: hip and shoulder pain improved; taking prednisone 10mg daily; denies any side effects, sleeping okay  · Has dermatology skin check next month  · Chronic DVT: last INR Dec, 2020; he reports missing a coumadin check. He left a message, but has not yet received a call back. · Paroxysmal Tachycardia: denies any recent symptoms; scheduled for cardiology follow up in Feb, 2021  · Left Hip Arthritis: scheduled for hip replacement on 6/24/21; plans to have injection March, 2021  · Acoustic Neuroma: last saw ENT Aug, 2020 with plan to f/u in one year  · ISABELLA: complainant with CPAP      Review of Systems  Review of Systems   Constitutional: Negative for chills and fever. Respiratory: Negative for cough, shortness of breath and wheezing. Cardiovascular: Negative for chest pain and palpitations. Allergies: Sulfa (sulfonamide antibiotics)    Medications: (Updated to reflect final medication list after visit)    Current Outpatient Medications:     aspirin delayed-release 81 mg tablet, Take 1 Tab by mouth every other day., Disp: 1 Tab, Rfl: 0    predniSONE (DELTASONE) 10 mg tablet, Take 10 mg by mouth daily (with breakfast) for 28 days. , Disp: 28 Tab, Rfl: 0    atorvastatin (LIPITOR) 10 mg tablet, Take 1 Tab by mouth daily. , Disp: 90 Tab, Rfl: 1    allopurinoL (ZYLOPRIM) 100 mg tablet, Take 0.5 Tabs by mouth daily. , Disp: 45 Tab, Rfl: 0    warfarin (COUMADIN) 1 mg tablet, 3.5 mg daily except M/W/F take 2.5 mg, Disp: 48 Tab, Rfl: 1    warfarin (Coumadin) 2.5 mg tablet, 3.5 mg daily except M/W/F take 2.5 mg, Disp: 90 Tab, Rfl: 1    PARoxetine (PaxiL) 20 mg tablet, Take 2 Tabs by mouth every evening. (Patient taking differently: Take 20 mg by mouth two (2) times a day.), Disp: 180 Tab, Rfl: 1    Comp. Stocking,Knee,Regular,Lrg (Relief Knee Open Toe Stocking) misc, Use daily for lower extremity swelling, Size large 30-40mm, Model # [id number], Disp: 2 Each, Rfl: 1    sotaloL (BETAPACE) 80 mg tablet, Take 1 Tab by mouth two (2) times a day., Disp: 180 Tab, Rfl: 1    iron 18 mg tab, Take  by mouth., Disp: , Rfl:     ascorbic acid, vitamin C, (VITAMIN C) 500 mg tablet, Take  by mouth., Disp: , Rfl:     gluc case/chondro case A/vit C/Mn (GLUCOSAMINE 1500 COMPLEX PO), Take  by mouth., Disp: , Rfl:     vitamin e (E GEMS) 1,000 unit capsule, Take 1,000 Units by mouth daily. , Disp: , Rfl:     thiamine HCL (VITAMIN B-1) 100 mg tablet, Take  by mouth daily. , Disp: , Rfl:     folic acid (FOLVITE) 1 mg tablet, Take  by mouth daily. , Disp: , Rfl:     cholecalciferol (VITAMIN D3) (2,000 UNITS /50 MCG) cap capsule, Take 2,000 Units by mouth daily. , Disp: , Rfl:     magnesium 250 mg tab, Take  by mouth., Disp: , Rfl:     calcium carbonate (CALCIUM 500 PO), Take  by mouth., Disp: , Rfl:     turmeric (CURCUMIN), by Does Not Apply route., Disp: , Rfl:     History  Patient Care Team:  Mary Diaz MD as PCP - General (Family Medicine)  Mary Diaz MD [facility]  Sherrie Sparks MD as Physician (Otolaryngology)    Past Medical History: he has a past medical history of Acoustic neuroma ([facility] [address] (2/18/2020), Chronic deep vein thrombosis (DVT) of lower extremity ([facility] [address] (2/14/2020), Hypercholesteremia (2/14/2020), ISABELLA on CPAP (2/18/2020), and Paroxysmal tachycardia, unspecified ([facility] [address] (2/14/2020). Past Surgical History: he has a past surgical history that includes hx orthopaedic; hx colonoscopy; and hx hernia repair.     Family Medical History: family history is not on file.    Social History: he reports that he has quit smoking. His smoking use included pipe. He has never used smokeless tobacco. He reports current alcohol use. He reports that he does not use drugs. Objective:     Physical Exam  Constitutional:       General: He is not in acute distress. Appearance: Normal appearance. He is not ill-appearing or toxic-appearing. HENT:      Head: Normocephalic. Right Ear: External ear normal.      Left Ear: External ear normal.      Nose: Nose normal.      Mouth/Throat:      Mouth: Mucous membranes are moist.   Eyes:      General: No scleral icterus. Right eye: No discharge. Left eye: No discharge. Extraocular Movements: Extraocular movements intact. Conjunctiva/sclera: Conjunctivae normal.   Neck:      Musculoskeletal: Normal range of motion. Pulmonary:      Effort: Pulmonary effort is normal. No respiratory distress. Neurological:      Mental Status: He is alert and oriented to person, place, and time. Mental status is at baseline. Psychiatric:         Mood and Affect: Mood normal.         Behavior: Behavior normal.         Thought Content: Thought content normal.         Judgment: Judgment normal.         Assessment & Plan:      ICD-10-CM ICD-9-CM    1. Polymyalgia rheumatica (HCC)  M35.3 725    2. Long term (current) use of anticoagulants  Z79.01 V58.61    3. Atrial fibrillation, unspecified type ([facility] [address]  I48.91 427.31    4. Chronic deep vein thrombosis (DVT) of distal vein of lower extremity, unspecified laterality ([facility])  I82.5Z9 453.52    5. Moderate sun exposure, subsequent encounter  X32. XXXD [id number]    1. Hx of melanoma excision  Z98.890 V15.29     Z85.820     7. Acoustic neuroma (HCC)  D33.3 225.1    8. Anticoagulated on Coumadin  Z79.01 V58.61    9. Anxiety  F41.9 300.00    10. Chronic gout without tophus, unspecified cause, unspecified site  M1A. 9XX0 274.02    11.  ISABELLA on CPAP  G47.33 327.23     Z99.89 V46.8      · PMR: Chronic, improved on steroid taper. Continue to decreased prednisone by 2.5mg every 4 weeks. Discussed monitoring for headaches and vision changes and PMR is risk factor for temporal arteritis. · On AC with coumadin in setting of Afib and Chronic DVT: Chronic, Discussed scheduling INR check with cardiology given that patient is on prolonged steroids. Discussed red flag symptoms and reasons to call or go to ED. Discussed red flag symptoms and reasons to call or go to ED  All other conditions listed above: chronic and stable. Will continue current treatment regimen. Will check labs as reflected above. Discussed following up with specialist as scheduled. Discussed recommendations for diet and exercise. I was in the office while conducting this encounter. Consent:  He and/or his healthcare decision maker is aware that this patient-initiated Telehealth encounter is a billable service, with coverage as determined by his insurance carrier. He is aware that he may receive a bill and has provided verbal consent to proceed: Yes    This virtual visit was conducted via [employer]. Pursuant to the emergency declaration under the [facility] waiver authority and the [employer] Act, this Virtual  Visit was conducted to reduce the patient's risk of exposure to COVID-19 and provide continuity of care for an established patient. Services were provided through a video synchronous discussion virtually to substitute for in-person clinic visit. Due to this being a TeleHealth evaluation, many elements of the physical examination are unable to be assessed. Total Time: minutes: 21-30 minutes. I have discussed the diagnosis with the patient and the intended plan as seen in the above orders. The patient has received an after-visit summary along with patient information handout.   I have discussed medication side effects and warnings with the patient as well. Disposition  Follow-up and Dispositions    · Return in about 4 weeks (around 2/16/2021).            Donny Kruse MD

## 2021-02-01 ENCOUNTER — TELEPHONE (OUTPATIENT)
Dept: FAMILY MEDICINE CLINIC | Age: 81
End: 2021-02-01

## 2021-02-01 DIAGNOSIS — M35.3 POLYMYALGIA RHEUMATICA (HCC): Primary | ICD-10-CM

## 2021-02-01 RX ORDER — PREDNISONE 2.5 MG/1
7.5 TABLET ORAL
Qty: 84 TAB | Refills: 0 | Status: SHIPPED | OUTPATIENT
Start: 2021-02-09 | End: 2021-03-01 | Stop reason: SDUPTHER

## 2021-02-02 ENCOUNTER — ANTI-COAG VISIT (OUTPATIENT)
Dept: CARDIOLOGY CLINIC | Age: 81
End: 2021-02-02
Payer: MEDICARE

## 2021-02-02 DIAGNOSIS — Z79.01 LONG TERM (CURRENT) USE OF ANTICOAGULANTS: ICD-10-CM

## 2021-02-02 DIAGNOSIS — I82.5Z9 CHRONIC DEEP VEIN THROMBOSIS (DVT) OF DISTAL VEIN OF LOWER EXTREMITY, UNSPECIFIED LATERALITY (HCC): ICD-10-CM

## 2021-02-02 LAB
INR BLD: 2.2
PT POC: NORMAL
VALID INTERNAL CONTROL?: YES

## 2021-02-02 PROCEDURE — 85610 PROTHROMBIN TIME: CPT | Performed by: INTERNAL MEDICINE

## 2021-02-02 NOTE — PROGRESS NOTES
A full discussion of the nature of anticoagulants has been carried out. A benefit risk analysis has been presented to the patient, so that they understand the justification for choosing anticoagulation at this time. The need for frequent and regular monitoring, precise dosage adjustment and compliance is stressed. Side effects of potential bleeding are discussed. The patient should avoid any OTC items containing aspirin or ibuprofen, and should avoid great swings in general diet. Avoid alcohol consumption. Call if any signs of abnormal bleeding. Next PT/INR test in 1 month. Pt dose was unchanged.      Anticoagulation Summary  As of 2021    INR goal:  2.0-3.0   TTR:  74.8 % (8.5 mo)   INR used for dosin.2 (2021)   Warfarin maintenance plan:  2.5 mg (2.5 mg x 1) every Mon, Wed, Fri; 3.5 mg (2.5 mg x 1 and 1 mg x 1) all other days   Weekly warfarin total:  21.5 mg   No change documented:  Marianne Reyes RN   Plan last modified:  Manuel Perry RN (2020)   Next INR check:  3/2/2021   Target end date:      Indications    Chronic deep vein thrombosis (DVT) of lower extremity ([facility] [address] [I82.509]  Long term (current) use of anticoagulants [Z79.01]             Anticoagulation Episode Summary     INR check location:      Preferred lab:      Send INR reminders to:      Comments:        Anticoagulation Care Providers     Provider Role Specialty Phone number    Jayden De La Fuente MD [facility] [phone]          Future Appointments   Date Time Provider Corinna Johnston   2021 10:20 AM MD ROBLES Granado AMB   3/2/2021 10:20 AM VERENICE ESTRADA AMB

## 2021-02-09 ENCOUNTER — OFFICE VISIT (OUTPATIENT)
Dept: CARDIOLOGY CLINIC | Age: 81
End: 2021-02-09
Payer: MEDICARE

## 2021-02-09 VITALS
WEIGHT: 198 LBS | OXYGEN SATURATION: 99 % | HEART RATE: 59 BPM | DIASTOLIC BLOOD PRESSURE: 70 MMHG | SYSTOLIC BLOOD PRESSURE: 110 MMHG | BODY MASS INDEX: 28.35 KG/M2 | RESPIRATION RATE: 18 BRPM | HEIGHT: 70 IN

## 2021-02-09 DIAGNOSIS — I48.3 TYPICAL ATRIAL FLUTTER (HCC): Primary | ICD-10-CM

## 2021-02-09 DIAGNOSIS — Z79.01 LONG TERM (CURRENT) USE OF ANTICOAGULANTS: ICD-10-CM

## 2021-02-09 DIAGNOSIS — I35.8 AORTIC VALVE SCLEROSIS: ICD-10-CM

## 2021-02-09 DIAGNOSIS — R06.09 CHRONIC DYSPNEA: ICD-10-CM

## 2021-02-09 DIAGNOSIS — I82.5Z1 CHRONIC DEEP VEIN THROMBOSIS (DVT) OF DISTAL VEIN OF RIGHT LOWER EXTREMITY (HCC): ICD-10-CM

## 2021-02-09 PROCEDURE — 1101F PT FALLS ASSESS-DOCD LE1/YR: CPT | Performed by: INTERNAL MEDICINE

## 2021-02-09 PROCEDURE — G8536 NO DOC ELDER MAL SCRN: HCPCS | Performed by: INTERNAL MEDICINE

## 2021-02-09 PROCEDURE — G8419 CALC BMI OUT NRM PARAM NOF/U: HCPCS | Performed by: INTERNAL MEDICINE

## 2021-02-09 PROCEDURE — 93010 ELECTROCARDIOGRAM REPORT: CPT | Performed by: INTERNAL MEDICINE

## 2021-02-09 PROCEDURE — 93005 ELECTROCARDIOGRAM TRACING: CPT | Performed by: INTERNAL MEDICINE

## 2021-02-09 PROCEDURE — G8427 DOCREV CUR MEDS BY ELIG CLIN: HCPCS | Performed by: INTERNAL MEDICINE

## 2021-02-09 PROCEDURE — G8510 SCR DEP NEG, NO PLAN REQD: HCPCS | Performed by: INTERNAL MEDICINE

## 2021-02-09 PROCEDURE — G0463 HOSPITAL OUTPT CLINIC VISIT: HCPCS | Performed by: INTERNAL MEDICINE

## 2021-02-09 PROCEDURE — 99214 OFFICE O/P EST MOD 30 MIN: CPT | Performed by: INTERNAL MEDICINE

## 2021-02-09 RX ORDER — CALCIUM CARBONATE/VITAMIN D3 600MG-5MCG
1 TABLET ORAL DAILY
COMMUNITY
End: 2021-04-28

## 2021-02-09 RX ORDER — FERROUS SULFATE 324(65)MG
1 TABLET, DELAYED RELEASE (ENTERIC COATED) ORAL DAILY
COMMUNITY
End: 2021-06-15

## 2021-02-09 RX ORDER — MULTIVITAMIN/IRON/FOLIC ACID 18MG-0.4MG
1 TABLET ORAL DAILY
COMMUNITY

## 2021-02-09 NOTE — PROGRESS NOTES
[place]:   [phone]    HPI: Patti Diamond, a 68y.o. year-old who presents for evaluation of AFib. Getting hip surgery in June 2021 with Dr. Vicente Reed  No palpitations  No chest pain   No dizziness or falls  His dyspnea with exertion is chronic   Wears compression sock on right leg and edema is well controlled  Had right leg crushed with DVT and phlebitis after a rugby injury - on coumadin for that, stable INR with coumadin therapy for years  Volunteers for [facility]    Today we reviewed his Coumadin therapy as well as the excess anticoagulants he is taking. At this point there is not really any added benefit of fish oil turmeric and aspirin for him but there certainly an additive risk of harm in the setting of increased bleeding risk. His EKG last year confirmed rate controlled 4-1 a flutter m for which she is taking sotalol and today he is in sinus bradycardia with acceptable intervals. This point we will continue the Coumadin he sees asymptomatic related to his arrhythmia 7 I cannot do anything further as far as management at this time. He does need an echo to evaluate his heart function as that is been a few years and look at his heart valves. Stress test last year looked fine     Assessment/Plan:  1. Right sided DVT/PE - on coumadin, last INR 2.2 earlier this month   2. ISABELLA on CPAP  3. Parox AFib/aflutter - on sotalol 80mg BID, will repeat ECG today to reassess, labs from 12/30 reviewed  -on coumadin for anticoagulation, INR 2.2 earlier this month  -advised him to stop ASA every other day and vitamin E since the combination of those medications increases his risk of bleeding with coumadin   -will repeat TTE at his next visit to reassess chamber size, etc  4. 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 PMR - on prednisone now per PCP     Nuc Stress Test 8/20 - no ischemia     Fhx no early CAD  Soc remote pipe smoker    He  has a past medical history of Acoustic neuroma ([facility] (2/18/2020), Chronic deep vein thrombosis (DVT) of lower extremity ([facility] (2/14/2020), Hypercholesteremia (2/14/2020), ISABELLA on CPAP (2/18/2020), and Paroxysmal tachycardia, unspecified ([facility] (2/14/2020). Cardiovascular ROS: positive for - dyspnea on exertion  Respiratory ROS: positive for - shortness of breath  Neurological ROS: no TIA or stroke symptoms  All other systems negative except as above. PE  Vitals:    02/09/21 1040   BP: 110/70   Pulse: (!) 59   Resp: 18   SpO2: 99%   Weight: 198 lb (89.8 kg)   Height: 5' 10\" (1.778 m)    Body mass index is 28.41 kg/m².    General appearance - alert, well appearing, and in no distress  Mental status - affect appropriate to mood  Eyes - sclera anicteric, moist mucous membranes  Neck - supple, no significant adenopathy  Lymphatics - no  lymphadenopathy  Chest - clear to auscultation, no wheezes, rales or rhonchi  Heart - normal rate, regular rhythm, normal S1, S2, no murmurs, rubs, clicks or gallops  Abdomen - soft, nontender, nondistended  Back exam - full range of motion, no tenderness  Neurological - cranial nerves II through XII grossly intact, no focal deficit  Musculoskeletal - no muscular tenderness noted, normal strength  Extremities - peripheral pulses normal, mild RLE swelling  Skin - normal coloration  no rashes    12 lead ECG: sinus bradycardia VR 54 bpm, QTc 420ms    Recent Labs:  Lab Results   Component Value Date/Time    Cholesterol, total 140 11/11/2020 07:54 AM    HDL Cholesterol 61 11/11/2020 07:54 AM    LDL, calculated 64.8 11/11/2020 07:54 AM    Triglyceride 71 11/11/2020 07:54 AM    CHOL/HDL Ratio 2.3 11/11/2020 07:54 AM     Lab Results   Component Value Date/Time    Creatinine 0.82 12/30/2020 11:20 AM     Lab Results   Component Value Date/Time BUN 18 12/30/2020 11:20 AM     Lab Results   Component Value Date/Time    Potassium 5.3 (H) 12/30/2020 11:20 AM     No results found for: HBA1C, HGBE8, NIR1AQAP, DLH4OKIB  Lab Results   Component Value Date/Time    HGB 10.9 (L) 12/08/2020 08:54 AM     Lab Results   Component Value Date/Time    PLATELET 454 04/19/8589 08:54 AM       Reviewed:  Past Medical History:   Diagnosis Date    Acoustic neuroma ([facility] 2/18/2020    Chronic deep vein thrombosis (DVT) of lower extremity (HCC) 2/14/2020    1.8 to 2.5 is goal    Hypercholesteremia 2/14/2020    Lipitor 20mg    ISABELLA on CPAP 2/18/2020    Paroxysmal tachycardia, unspecified ([facility] 2/14/2020    Sotalol Saw Sharron Man MD      Social History     Tobacco Use   Smoking Status Former Smoker    Types: Pipe   Smokeless Tobacco Never Used     Social History     Substance and Sexual Activity   Alcohol Use Yes    Alcohol/week: 21.0 standard drinks    Types: 7 Glasses of wine, 7 Cans of beer, 7 Shots of liquor per week    Drinks per session: 1 or 2    Binge frequency: Never     Allergies   Allergen Reactions    Sulfa (Sulfonamide Antibiotics) Rash       Current Outpatient Medications   Medication Sig    calcium-vitamin D 600 mg(1,500mg) -200 unit tab Take 1 Tab by mouth daily.  ferrous sulfate 324 mg (65 mg iron) tablet Take 1 Tab by mouth daily.  predniSONE (DELTASONE) 2.5 mg tablet Take 3 Tabs by mouth daily (with breakfast) for 28 days.  atorvastatin (LIPITOR) 10 mg tablet Take 1 Tab by mouth daily.  allopurinoL (ZYLOPRIM) 100 mg tablet Take 0.5 Tabs by mouth daily.  warfarin (COUMADIN) 1 mg tablet 3.5 mg daily except M/W/F take 2.5 mg    warfarin (Coumadin) 2.5 mg tablet 3.5 mg daily except M/W/F take 2.5 mg    PARoxetine (PaxiL) 20 mg tablet Take 2 Tabs by mouth every evening. (Patient taking differently: Take 20 mg by mouth two (2) times a day.)    Comp. Stocking,Knee,Regular,Lrg (Relief Knee Open Toe Stocking) misc Use daily for lower extremity swelling, Size large 30-40mm, Model # [id number]    sotaloL (BETAPACE) 80 mg tablet Take 1 Tab by mouth two (2) times a day.  ascorbic acid, vitamin C, (VITAMIN C) 500 mg tablet Take  by mouth.  gluc case/chondro case A/vit C/Mn (GLUCOSAMINE 1500 COMPLEX PO) Take  by mouth.  thiamine HCL (VITAMIN B-1) 100 mg tablet Take  by mouth daily.  folic acid (FOLVITE) 1 mg tablet Take  by mouth daily.  cholecalciferol (VITAMIN D3) (2,000 UNITS /50 MCG) cap capsule Take 2,000 Units by mouth daily.  magnesium 250 mg tab Take  by mouth.  calcium carbonate (CALCIUM 500 PO) Take  by mouth.  turmeric (CURCUMIN) by Does Not Apply route.  b complex-folic acid 0.4 mg tablet Take 1 Tab by mouth daily. No current facility-administered medications for this visit.         Crista Esparza MD  [facility] [place]  [address] [facility] [address] [place] [address]

## 2021-02-11 DIAGNOSIS — F41.9 ANXIETY: ICD-10-CM

## 2021-02-11 RX ORDER — PAROXETINE HYDROCHLORIDE 20 MG/1
20 TABLET, FILM COATED ORAL 2 TIMES DAILY
Qty: 180 TAB | Refills: 1 | Status: SHIPPED | OUTPATIENT
Start: 2021-02-11 | End: 2021-07-12 | Stop reason: SDUPTHER

## 2021-02-11 NOTE — TELEPHONE ENCOUNTER
Last visit 01/19/2021 Virtual visit MD Amy Sosa   Next appointment 4 weeks (02/2021) - Nothing scheduled   Previous refill encounter(s)   08/18/2020 Paxil #180 with 1 refill     Requested Prescriptions     Pending Prescriptions Disp Refills    PARoxetine (PaxiL) 20 mg tablet 180 Tab 1     Sig: Take 1 Tab by mouth two (2) times a day.

## 2021-02-16 ENCOUNTER — TELEPHONE (OUTPATIENT)
Dept: FAMILY MEDICINE CLINIC | Age: 81
End: 2021-02-16

## 2021-02-16 NOTE — TELEPHONE ENCOUNTER
MD Cynthia Will pharmacy is sending fax request for prednisone 10mg tabs. Noted faxed: 2/7, 2/11 and 2/12. I contacted pharmacy to advise patient was tapering per notes and they stated patient keeps requesting. They stated the 2.5mg tabs were picked up sent on 2/1/21 for 28 days. Not sure why he is requesting? Please advise.   Thanks, yamil

## 2021-02-17 NOTE — TELEPHONE ENCOUNTER
Called patient. He states that he had requested Paxil, not prednisone. Paxil was sent to pharmacy on 2/11/21.

## 2021-02-24 DIAGNOSIS — E79.0 ELEVATED URIC ACID IN BLOOD: ICD-10-CM

## 2021-02-24 RX ORDER — SOTALOL HYDROCHLORIDE 80 MG/1
80 TABLET ORAL 2 TIMES DAILY
Qty: 180 TAB | Refills: 1 | Status: SHIPPED | OUTPATIENT
Start: 2021-02-24 | End: 2021-08-31 | Stop reason: SDUPTHER

## 2021-02-24 RX ORDER — ALLOPURINOL 100 MG/1
50 TABLET ORAL DAILY
Qty: 45 TAB | Refills: 1 | Status: SHIPPED | OUTPATIENT
Start: 2021-02-24 | End: 2021-08-31 | Stop reason: SDUPTHER

## 2021-02-24 NOTE — TELEPHONE ENCOUNTER
Last Visit: VV 1/19/21 MD Jocelyn Boston  Next Appointment: Not scheduled-to return 4 weeks  Previous Refill Encounter(s):   Allopurinol 11/25/20 45  Sotalol 6/5/20 180 + 1    Requested Prescriptions     Pending Prescriptions Disp Refills    allopurinoL (ZYLOPRIM) 100 mg tablet 45 Tab 1     Sig: Take 0.5 Tabs by mouth daily.  sotaloL (BETAPACE) 80 mg tablet 180 Tab 1     Sig: Take 1 Tab by mouth two (2) times a day.

## 2021-03-01 DIAGNOSIS — M35.3 POLYMYALGIA RHEUMATICA (HCC): ICD-10-CM

## 2021-03-01 RX ORDER — PREDNISONE 5 MG/1
5 TABLET ORAL
Qty: 28 TAB | Refills: 0 | Status: SHIPPED | OUTPATIENT
Start: 2021-03-09 | End: 2021-03-16 | Stop reason: SDUPTHER

## 2021-03-02 ENCOUNTER — ANTI-COAG VISIT (OUTPATIENT)
Dept: CARDIOLOGY CLINIC | Age: 81
End: 2021-03-02
Payer: MEDICARE

## 2021-03-02 DIAGNOSIS — Z79.01 LONG TERM (CURRENT) USE OF ANTICOAGULANTS: ICD-10-CM

## 2021-03-02 DIAGNOSIS — I82.5Z9 CHRONIC DEEP VEIN THROMBOSIS (DVT) OF DISTAL VEIN OF LOWER EXTREMITY, UNSPECIFIED LATERALITY (HCC): ICD-10-CM

## 2021-03-02 LAB
INR BLD: 2.2
PT POC: NORMAL
VALID INTERNAL CONTROL?: YES

## 2021-03-02 PROCEDURE — 85610 PROTHROMBIN TIME: CPT | Performed by: INTERNAL MEDICINE

## 2021-03-02 NOTE — PROGRESS NOTES
A full discussion of the nature of anticoagulants has been carried out. A benefit risk analysis has been presented to the patient, so that they understand the justification for choosing anticoagulation at this time. The need for frequent and regular monitoring, precise dosage adjustment and compliance is stressed. Side effects of potential bleeding are discussed. The patient should avoid any OTC items containing aspirin or ibuprofen, and should avoid great swings in general diet. Avoid alcohol consumption. Call if any signs of abnormal bleeding. Next PT/INR test in 1 month. Pt dose was unchanged.      Anticoagulation Summary  As of 3/2/2021    INR goal:  2.0-3.0   TTR:  77.3 % (9.4 mo)   INR used for dosin.2 (3/2/2021)   Warfarin maintenance plan:  2.5 mg (2.5 mg x 1) every Mon, Wed, Fri; 3.5 mg (2.5 mg x 1 and 1 mg x 1) all other days   Weekly warfarin total:  21.5 mg   No change documented:  Isabella Ocampo RN   Plan last modified:  Fredi Mares RN (2020)   Next INR check:  3/30/2021   Target end date:      Indications    Chronic deep vein thrombosis (DVT) of lower extremity ([facility] [I82.509]  Long term (current) use of anticoagulants [Z79.01]             Anticoagulation Episode Summary     INR check location:      Preferred lab:      Send INR reminders to:      Comments:        Anticoagulation Care Providers     Provider Role Specialty Phone number    Roberto Garcia MD [facility] [phone]          Future Appointments   Date Time Provider Corinna Johnston   3/30/2021 10:20 AM COUMVERENICE CHAMBERS BS AMB   8/10/2021  2:00 PM ECHOVERENICE AMB   8/10/2021  3:00 PM MD ROBLES Weinberg BS AMB

## 2021-03-05 ENCOUNTER — TRANSCRIBE ORDER (OUTPATIENT)
Dept: SCHEDULING | Age: 81
End: 2021-03-05

## 2021-03-05 DIAGNOSIS — M16.12 PRIMARY OSTEOARTHRITIS OF LEFT HIP: Primary | ICD-10-CM

## 2021-03-09 ENCOUNTER — HOSPITAL ENCOUNTER (OUTPATIENT)
Dept: GENERAL RADIOLOGY | Age: 81
Discharge: HOME | End: 2021-03-09
Attending: ORTHOPAEDIC SURGERY
Payer: MEDICARE

## 2021-03-09 DIAGNOSIS — M16.12 PRIMARY OSTEOARTHRITIS OF LEFT HIP: ICD-10-CM

## 2021-03-09 PROCEDURE — 74011250636 HC RX REV CODE- 250/636: Performed by: RADIOLOGY

## 2021-03-09 PROCEDURE — 74011000250 HC RX REV CODE- 250

## 2021-03-09 PROCEDURE — 20610 DRAIN/INJ JOINT/BURSA W/O US: CPT

## 2021-03-09 PROCEDURE — 74011000636 HC RX REV CODE- 636: Performed by: RADIOLOGY

## 2021-03-09 PROCEDURE — 74011000250 HC RX REV CODE- 250: Performed by: RADIOLOGY

## 2021-03-09 RX ORDER — BUPIVACAINE HYDROCHLORIDE 5 MG/ML
5 INJECTION, SOLUTION EPIDURAL; INTRACAUDAL
Status: COMPLETED | OUTPATIENT
Start: 2021-03-09 | End: 2021-03-09

## 2021-03-09 RX ORDER — TRIAMCINOLONE ACETONIDE 40 MG/ML
40 INJECTION, SUSPENSION INTRA-ARTICULAR; INTRAMUSCULAR
Status: COMPLETED | OUTPATIENT
Start: 2021-03-09 | End: 2021-03-09

## 2021-03-09 RX ORDER — LIDOCAINE HYDROCHLORIDE 10 MG/ML
INJECTION, SOLUTION EPIDURAL; INFILTRATION; INTRACAUDAL; PERINEURAL
Status: COMPLETED
Start: 2021-03-09 | End: 2021-03-09

## 2021-03-09 RX ORDER — SODIUM BICARBONATE 42 MG/ML
2 INJECTION, SOLUTION INTRAVENOUS
Status: COMPLETED | OUTPATIENT
Start: 2021-03-09 | End: 2021-03-09

## 2021-03-09 RX ADMIN — TRIAMCINOLONE ACETONIDE 40 MG: 40 INJECTION, SUSPENSION INTRA-ARTICULAR; INTRAMUSCULAR at 14:08

## 2021-03-09 RX ADMIN — IOHEXOL 20 ML: 180 INJECTION INTRAVENOUS at 14:07

## 2021-03-09 RX ADMIN — SODIUM BICARBONATE 10 MG: 42 INJECTION, SOLUTION INTRAVENOUS at 14:07

## 2021-03-09 RX ADMIN — LIDOCAINE HYDROCHLORIDE 10 ML: 10 INJECTION, SOLUTION EPIDURAL; INFILTRATION; INTRACAUDAL; PERINEURAL at 14:06

## 2021-03-09 RX ADMIN — BUPIVACAINE HYDROCHLORIDE 25 MG: 5 INJECTION, SOLUTION EPIDURAL; INTRACAUDAL; PERINEURAL at 14:08

## 2021-03-16 ENCOUNTER — TELEPHONE (OUTPATIENT)
Dept: FAMILY MEDICINE CLINIC | Age: 81
End: 2021-03-16

## 2021-03-16 DIAGNOSIS — M35.3 POLYMYALGIA RHEUMATICA (HCC): ICD-10-CM

## 2021-03-16 RX ORDER — PREDNISONE 5 MG/1
TABLET ORAL
Qty: 42 TAB | Refills: 0 | Status: SHIPPED | OUTPATIENT
Start: 2021-03-16 | End: 2021-04-28

## 2021-03-16 NOTE — TELEPHONE ENCOUNTER
Patient on prednisone taper. He should only be taking 5mg daily as written on last rx from 3/9 to 4/6. Please schedule him for patient visit to discuss prior to 4/6.     Alice Faustin MD

## 2021-03-16 NOTE — TELEPHONE ENCOUNTER
Called pt and he has been taking prednisone 5 mg tab 3 pills every morning since 3/1. He thought he was taking 2.5 mg tab. Advised that we have to start over on the taper. New directions are as follows:  10 mg for 1 week. He will start tomorrow   7.5 mg for 1 week start 3/24  5 mg for 4 weeks start 3/31  2.5 mg for 4 weeks start 4/28. Pt has #16 pills from his 3/1 prescription. He has another rx to p/u that was sent in on 3/9 for 5 mg. He is scheduled for a f/u appt for 4/28. He reports that he had L hip injection. He got first covid inj 3/7    Called pharmacy with above directions. They didn't get the 3/9 order of the prednisone. Gave verbal order (per Dr Deanna Gilmore verbal order) for prednison 5 mg # 42.

## 2021-03-16 NOTE — TELEPHONE ENCOUNTER
MD Neha Barrow,    Receiving requests again (from patient per pharmacy) from [facility], for prednisone with written \"faxed 3/8, 3/10, 3/12. \"      Is there another taper of prednisone that we need? (due 4/1?)    (the paxil has refill- I advised pharmacy also that last time patient was actually requesting paxil.)    They are sending requests for 2.5mg (3 tabs daily dated 2/1/21) and 5mg - 1 daily dated 3/1/21 (both were for 28 days). Patient was due to return in Feb for appt. None scheduled.     Thanks, Dariusz Mccartneyty

## 2021-04-28 ENCOUNTER — OFFICE VISIT (OUTPATIENT)
Dept: FAMILY MEDICINE CLINIC | Age: 81
End: 2021-04-28
Payer: MEDICARE

## 2021-04-28 VITALS
WEIGHT: 196.2 LBS | RESPIRATION RATE: 16 BRPM | SYSTOLIC BLOOD PRESSURE: 118 MMHG | BODY MASS INDEX: 28.09 KG/M2 | HEART RATE: 68 BPM | TEMPERATURE: 98.3 F | OXYGEN SATURATION: 98 % | DIASTOLIC BLOOD PRESSURE: 68 MMHG | HEIGHT: 70 IN

## 2021-04-28 DIAGNOSIS — I82.5Z9 CHRONIC DEEP VEIN THROMBOSIS (DVT) OF DISTAL VEIN OF LOWER EXTREMITY, UNSPECIFIED LATERALITY (HCC): ICD-10-CM

## 2021-04-28 DIAGNOSIS — D33.3 ACOUSTIC NEUROMA (HCC): ICD-10-CM

## 2021-04-28 DIAGNOSIS — G31.84 MILD COGNITIVE IMPAIRMENT: Primary | ICD-10-CM

## 2021-04-28 DIAGNOSIS — M35.3 POLYMYALGIA RHEUMATICA (HCC): ICD-10-CM

## 2021-04-28 DIAGNOSIS — R06.09 CHRONIC DYSPNEA: ICD-10-CM

## 2021-04-28 DIAGNOSIS — Z91.81 AT RISK FOR FALLS: ICD-10-CM

## 2021-04-28 DIAGNOSIS — Z79.01 LONG TERM (CURRENT) USE OF ANTICOAGULANTS: ICD-10-CM

## 2021-04-28 DIAGNOSIS — I47.9 PAROXYSMAL TACHYCARDIA, UNSPECIFIED (HCC): ICD-10-CM

## 2021-04-28 DIAGNOSIS — Z99.89 OSA ON CPAP: ICD-10-CM

## 2021-04-28 DIAGNOSIS — Z23 ENCOUNTER FOR IMMUNIZATION: ICD-10-CM

## 2021-04-28 DIAGNOSIS — G47.33 OSA ON CPAP: ICD-10-CM

## 2021-04-28 DIAGNOSIS — M1A.9XX0 CHRONIC GOUT WITHOUT TOPHUS, UNSPECIFIED CAUSE, UNSPECIFIED SITE: ICD-10-CM

## 2021-04-28 DIAGNOSIS — R68.89 OTHER GENERAL SYMPTOMS AND SIGNS: ICD-10-CM

## 2021-04-28 DIAGNOSIS — Z79.01 ANTICOAGULATED ON COUMADIN: ICD-10-CM

## 2021-04-28 DIAGNOSIS — Z79.52 LONG TERM (CURRENT) USE OF SYSTEMIC STEROIDS: ICD-10-CM

## 2021-04-28 DIAGNOSIS — R71.8 ELEVATED MCV: ICD-10-CM

## 2021-04-28 LAB
COMMENT, HOLDF: NORMAL
SAMPLES BEING HELD,HOLD: NORMAL

## 2021-04-28 PROCEDURE — G8510 SCR DEP NEG, NO PLAN REQD: HCPCS | Performed by: FAMILY MEDICINE

## 2021-04-28 PROCEDURE — 1101F PT FALLS ASSESS-DOCD LE1/YR: CPT | Performed by: FAMILY MEDICINE

## 2021-04-28 PROCEDURE — G8419 CALC BMI OUT NRM PARAM NOF/U: HCPCS | Performed by: FAMILY MEDICINE

## 2021-04-28 PROCEDURE — G8536 NO DOC ELDER MAL SCRN: HCPCS | Performed by: FAMILY MEDICINE

## 2021-04-28 PROCEDURE — G0463 HOSPITAL OUTPT CLINIC VISIT: HCPCS | Performed by: FAMILY MEDICINE

## 2021-04-28 PROCEDURE — 90732 PPSV23 VACC 2 YRS+ SUBQ/IM: CPT | Performed by: FAMILY MEDICINE

## 2021-04-28 PROCEDURE — 99213 OFFICE O/P EST LOW 20 MIN: CPT | Performed by: FAMILY MEDICINE

## 2021-04-28 PROCEDURE — G8427 DOCREV CUR MEDS BY ELIG CLIN: HCPCS | Performed by: FAMILY MEDICINE

## 2021-04-28 NOTE — PROGRESS NOTES
Chief Complaint   Patient presents with    Follow Up Chronic Condition     1. Have you been to the ER, urgent care clinic since your last visit? Hospitalized since your last visit? No    2. Have you seen or consulted any other health care providers outside of the [address] [place] [address] since your last visit? Include any pap smears or colon screening.  Yes Where: Dr Alfie Candelario

## 2021-04-28 NOTE — PROGRESS NOTES
Chyrl Collet  68 y.o. male  1940  [place]  [id number]     [address] [facility]       Encounter Date: 4/28/2021           Established Patient Visit Note: Ahsan Carrillo MD    Reason for Appointment:  Chief Complaint   Patient presents with    Follow Up Chronic Condition       History of Present Illness:  History provided by patient    Chyrl Collet is a 68 y.o. male who presents to clinic today for:    · PMR: has completed course of prednisone, no longer has any symptoms at this time  · Primary Osteoarthritis of Left Hip: followed by orthopedics and planning surgery in June, 2021  · Parox Afib/aflutter/Dyslipidemia: Recently saw cardiolgy in feb with plan to obtain echo at next visit and continue current treatment. Anticoagulated on Coumadin  ·  Mild cognitive impairment. Denies any recent changes, reports that memory has been about the same  ·  Chronic gout without tophus: denies any recent symptoms  · Acoustic neuroma [facility]): followed by ENT; has not had an MRI recently  · Chronic deep vein thrombosis (DVT): he denies any recent changes; doing well on coumadin  · ISABELLA on CPAP: he reports that his sleep has been good recently  · Chronic dyspnea: denies any recent changes  · Recently saw dermatollgy with last visit in jan/feb 2021  · Trips: he reports that he tripped on the kitchen steps the other day but he ws able to roll through it. He reports that this has ahpeened more often recently. Review of Systems  Review of Systems   Constitutional: Negative for chills and fever. Respiratory: Negative for cough, shortness of breath (has chornic dyspnea, but no recent changes) and wheezing. Cardiovascular: Negative for chest pain and palpitations.           Allergies: Sulfa (sulfonamide antibiotics)    Medications: (Updated to reflect final medication list after visit)    Current Outpatient Medications:     allopurinoL (ZYLOPRIM) 100 mg tablet, Take 0.5 Tabs by mouth daily. , Disp: 45 Tab, Rfl: 1    sotaloL (BETAPACE) 80 mg tablet, Take 1 Tab by mouth two (2) times a day., Disp: 180 Tab, Rfl: 1    PARoxetine (PaxiL) 20 mg tablet, Take 1 Tab by mouth two (2) times a day., Disp: 180 Tab, Rfl: 1    ferrous sulfate 324 mg (65 mg iron) tablet, Take 1 Tab by mouth daily. , Disp: , Rfl:     b complex-folic acid 0.4 mg tablet, Take 1 Tab by mouth daily. , Disp: , Rfl:     atorvastatin (LIPITOR) 10 mg tablet, Take 1 Tab by mouth daily. , Disp: 90 Tab, Rfl: 1    warfarin (COUMADIN) 1 mg tablet, 3.5 mg daily except M/W/F take 2.5 mg, Disp: 48 Tab, Rfl: 1    warfarin (Coumadin) 2.5 mg tablet, 3.5 mg daily except M/W/F take 2.5 mg, Disp: 90 Tab, Rfl: 1    Comp. Stocking,Knee,Regular,Lrg (Relief Knee Open Toe Stocking) misc, Use daily for lower extremity swelling, Size large 30-40mm, Model # [id number], Disp: 2 Each, Rfl: 1    ascorbic acid, vitamin C, (VITAMIN C) 500 mg tablet, Take  by mouth., Disp: , Rfl:     gluc case/chondro case A/vit C/Mn (GLUCOSAMINE 1500 COMPLEX PO), Take  by mouth., Disp: , Rfl:     thiamine HCL (VITAMIN B-1) 100 mg tablet, Take  by mouth daily. , Disp: , Rfl:     folic acid (FOLVITE) 1 mg tablet, Take  by mouth daily. , Disp: , Rfl:     cholecalciferol (VITAMIN D3) (2,000 UNITS /50 MCG) cap capsule, Take 2,000 Units by mouth daily. , Disp: , Rfl:     magnesium 250 mg tab, Take  by mouth., Disp: , Rfl:     calcium carbonate (CALCIUM 500 PO), Take  by mouth., Disp: , Rfl:     turmeric (CURCUMIN), by Does Not Apply route., Disp: , Rfl:     History  Patient Care Team:  Lucia Valencia MD as PCP - General (Family Medicine)  Lucia Valencia MD as PCP - [address] [place] [address]  [facility] Provider  Myles Cortés MD as Physician (Otolaryngology)  Steve Rowe MD as Physician (Cardiology)    Past Medical History: he has a past medical history of Acoustic neuroma [facility]) (2/18/2020), Chronic deep vein thrombosis (DVT) of lower extremity ([facility] (2/14/2020), Hypercholesteremia (2/14/2020), ISABELLA on CPAP (2/18/2020), and Paroxysmal tachycardia, unspecified ([facility] (2/14/2020). Past Surgical History: he has a past surgical history that includes hx orthopaedic; hx colonoscopy; and hx hernia repair. Family Medical History: family history is not on file. Social History: he reports that he has quit smoking. His smoking use included pipe. He has never used smokeless tobacco. He reports current alcohol use of about 21.0 standard drinks of alcohol per week. He reports that he does not use drugs. Health Maintenance Due   Topic Date Due    Hepatitis C Screening  Never done       Objective:   Visit Vitals  /68 (BP 1 Location: Left upper arm, BP Patient Position: Sitting)   Pulse 68   Temp 98.3 °F (36.8 °C) (Oral)   Resp 16   Ht 5' 10\" (1.778 m)   Wt 196 lb 3.2 oz (89 kg)   SpO2 98%   BMI 28.15 kg/m²     Wt Readings from Last 3 Encounters:   04/28/21 196 lb 3.2 oz (89 kg)   02/09/21 198 lb (89.8 kg)   09/30/20 193 lb 6.4 oz (87.7 kg)       Physical Exam  Constitutional:       Appearance: Normal appearance. HENT:      Head: Normocephalic and atraumatic. Right Ear: External ear normal.      Left Ear: External ear normal.      Nose: Nose normal.      Mouth/Throat:      Pharynx: No oropharyngeal exudate. Eyes:      General: No scleral icterus. Right eye: No discharge. Left eye: No discharge. Conjunctiva/sclera: Conjunctivae normal.      Pupils: Pupils are equal, round, and reactive to light. Neck:      Musculoskeletal: Normal range of motion and neck supple. Thyroid: No thyromegaly. Vascular: No JVD. Trachea: No tracheal deviation. Cardiovascular:      Rate and Rhythm: Normal rate and regular rhythm. Heart sounds: Normal heart sounds. No murmur. No friction rub. No gallop. Pulmonary:      Effort: Pulmonary effort is normal. No respiratory distress. Breath sounds: Normal breath sounds. No stridor. No wheezing. Musculoskeletal: Normal range of motion. General: No tenderness or deformity. Lymphadenopathy:      Cervical: No cervical adenopathy. Skin:     General: Skin is warm and dry. Neurological:      Mental Status: He is alert. Cranial Nerves: No cranial nerve deficit. Coordination: Coordination normal.      Gait: Gait is intact. Deep Tendon Reflexes: Reflexes normal.         Assessment & Plan:      ICD-10-CM ICD-9-CM    1. Mild cognitive impairment  G31.84 331.83    2. Encounter for immunization  Z23 V03.89 PNEUMOCOCCAL POLYSACCHARIDE VACCINE, 23-VALENT, ADULT OR IMMUNOSUPPRESSED PT DOSE,      ADMIN INFLUENZA VIRUS VAC      ADMIN PNEUMOCOCCAL VACCINE   3. Long term (current) use of anticoagulants  Z79.01 V58.61    4. Anticoagulated on Coumadin  Z79.01 V58.61    5. Chronic gout without tophus, unspecified cause, unspecified site  M1A. 9XX0 274.02 URIC ACID      URIC ACID   6. Acoustic neuroma ([facility])  D33.3 225.1    7. Chronic deep vein thrombosis (DVT) of distal vein of lower extremity, unspecified laterality ([facility])  I82.5Z9 453.52    8. ISABELLA on CPAP  G47.33 327.23     Z99.89 V46.8    9. Chronic dyspnea  R06.09 786.09    10. Paroxysmal tachycardia, unspecified ([facility])  I47.9 427.2    11. At risk for falls  Z91.81 V15.88 DEXA BONE DENSITY STUDY AXIAL      REFERRAL TO PHYSICAL THERAPY   12. Long term (current) use of systemic steroids   Z79.52 V58.65 DEXA BONE DENSITY STUDY AXIAL      METABOLIC PANEL, COMPREHENSIVE      CBC W/O DIFF      CBC W/O DIFF      METABOLIC PANEL, COMPREHENSIVE   13. Elevated MCV  R71.8 790.09 METHYLMALONIC ACID      VITAMIN B12 & FOLATE      VITAMIN B12 & FOLATE      METHYLMALONIC ACID   14. Polymyalgia rheumatica ([facility])  M35.3 725 SED RATE (ESR)      C REACTIVE PROTEIN, QT      C REACTIVE PROTEIN, QT      SED RATE (ESR)   15. Other general symptoms and signs   R68.89 780.99 VITAMIN B12 & FOLATE      VITAMIN B12 & FOLATE     · At Risk For Falls: Chronic, uncontrolled. Referral to PT for fall prevention therapy and obtain DEXA given prior history of prednisone usage. · Elevated MCV: Chronic, unclear etiology. Check labs as above   All other conditions listed above: chronic and stable. Will continue current treatment regimen. Will check labs as reflected above. Discussed following up with specialist as scheduled. Discussed recommendations for diet and exercise. I have discussed the diagnosis with the patient and the intended plan as seen in the above orders. The patient has received an after-visit summary along with patient information handout. I have discussed medication side effects and warnings with the patient as well. Disposition  Follow-up and Dispositions    · Return in about 3 months (around 7/28/2021).            Eric Mixon MD

## 2021-04-29 LAB
ALBUMIN SERPL-MCNC: 3.7 G/DL (ref 3.5–5)
ALBUMIN/GLOB SERPL: 1.4 {RATIO} (ref 1.1–2.2)
ALP SERPL-CCNC: 59 U/L (ref 45–117)
ALT SERPL-CCNC: 34 U/L (ref 12–78)
ANION GAP SERPL CALC-SCNC: 7 MMOL/L (ref 5–15)
AST SERPL-CCNC: 25 U/L (ref 15–37)
BILIRUB SERPL-MCNC: 0.4 MG/DL (ref 0.2–1)
BUN SERPL-MCNC: 18 MG/DL (ref 6–20)
BUN/CREAT SERPL: 23 (ref 12–20)
CALCIUM SERPL-MCNC: 9.2 MG/DL (ref 8.5–10.1)
CHLORIDE SERPL-SCNC: 107 MMOL/L (ref 97–108)
CO2 SERPL-SCNC: 30 MMOL/L (ref 21–32)
CREAT SERPL-MCNC: 0.79 MG/DL (ref 0.7–1.3)
CRP SERPL-MCNC: <0.29 MG/DL (ref 0–0.6)
ERYTHROCYTE [DISTWIDTH] IN BLOOD BY AUTOMATED COUNT: 14.4 % (ref 11.5–14.5)
ERYTHROCYTE [SEDIMENTATION RATE] IN BLOOD: 13 MM/HR (ref 0–20)
FOLATE SERPL-MCNC: 54 NG/ML (ref 5–21)
GLOBULIN SER CALC-MCNC: 2.6 G/DL (ref 2–4)
GLUCOSE SERPL-MCNC: 109 MG/DL (ref 65–100)
HCT VFR BLD AUTO: 41.7 % (ref 36.6–50.3)
HGB BLD-MCNC: 12.7 G/DL (ref 12.1–17)
MCH RBC QN AUTO: 33.2 PG (ref 26–34)
MCHC RBC AUTO-ENTMCNC: 30.5 G/DL (ref 30–36.5)
MCV RBC AUTO: 109.2 FL (ref 80–99)
NRBC # BLD: 0 K/UL (ref 0–0.01)
NRBC BLD-RTO: 0 PER 100 WBC
PLATELET # BLD AUTO: 209 K/UL (ref 150–400)
PMV BLD AUTO: 10.1 FL (ref 8.9–12.9)
POTASSIUM SERPL-SCNC: 5.4 MMOL/L (ref 3.5–5.1)
PROT SERPL-MCNC: 6.3 G/DL (ref 6.4–8.2)
RBC # BLD AUTO: 3.82 M/UL (ref 4.1–5.7)
SODIUM SERPL-SCNC: 144 MMOL/L (ref 136–145)
URATE SERPL-MCNC: 7 MG/DL (ref 3.5–7.2)
VIT B12 SERPL-MCNC: 393 PG/ML (ref 193–986)
WBC # BLD AUTO: 6 K/UL (ref 4.1–11.1)

## 2021-05-03 LAB
Lab: NORMAL
METHYLMALONATE SERPL-SCNC: 194 NMOL/L (ref 0–378)

## 2021-05-04 ENCOUNTER — HOSPITAL ENCOUNTER (OUTPATIENT)
Dept: BONE DENSITY | Age: 81
Discharge: HOME | End: 2021-05-04
Attending: FAMILY MEDICINE
Payer: MEDICARE

## 2021-05-04 DIAGNOSIS — Z79.52 LONG TERM (CURRENT) USE OF SYSTEMIC STEROIDS: ICD-10-CM

## 2021-05-04 DIAGNOSIS — Z91.81 AT RISK FOR FALLS: ICD-10-CM

## 2021-05-04 PROCEDURE — 77080 DXA BONE DENSITY AXIAL: CPT

## 2021-05-11 RX ORDER — WARFARIN 1 MG/1
TABLET ORAL
Qty: 48 TAB | Refills: 1 | Status: SHIPPED | OUTPATIENT
Start: 2021-05-11 | End: 2021-11-11 | Stop reason: SDUPTHER

## 2021-05-11 RX ORDER — WARFARIN 2.5 MG/1
TABLET ORAL
Qty: 90 TAB | Refills: 1 | Status: SHIPPED | OUTPATIENT
Start: 2021-05-11 | End: 2021-12-09 | Stop reason: SDUPTHER

## 2021-05-11 NOTE — TELEPHONE ENCOUNTER
Last visit 04/28/2021 MD Hemalatha Shore   Next appointment 07/28/2021 MD Hemalatha Shore   Previous refill encounter(s)  11/24/2020:  - Coumadin 1 mg #48 with 1 refill,   - Coumadin 2.5 mg #90 with 1 refill     Requested Prescriptions     Pending Prescriptions Disp Refills    warfarin (COUMADIN) 1 mg tablet 48 Tab 1     Sig: 3.5 mg daily except M/W/F take 2.5 mg    warfarin (Coumadin) 2.5 mg tablet 90 Tab 1     Sig: 3.5 mg daily except M/W/F take 2.5 mg

## 2021-06-11 ENCOUNTER — ANTI-COAG VISIT (OUTPATIENT)
Dept: CARDIOLOGY CLINIC | Age: 81
End: 2021-06-11
Payer: MEDICARE

## 2021-06-11 DIAGNOSIS — Z79.01 LONG TERM (CURRENT) USE OF ANTICOAGULANTS: Primary | ICD-10-CM

## 2021-06-11 DIAGNOSIS — I48.3 TYPICAL ATRIAL FLUTTER (HCC): ICD-10-CM

## 2021-06-11 LAB
INR BLD: 1.7
PT POC: NORMAL
VALID INTERNAL CONTROL?: YES

## 2021-06-11 PROCEDURE — 85610 PROTHROMBIN TIME: CPT | Performed by: INTERNAL MEDICINE

## 2021-06-11 NOTE — PROGRESS NOTES
A full discussion of the nature of anticoagulants has been carried out. A benefit risk analysis has been presented to the patient, so that they understand the justification for choosing anticoagulation at this time. The need for frequent and regular monitoring, precise dosage adjustment and compliance is stressed. Side effects of potential bleeding are discussed. The patient should avoid any OTC items containing aspirin or ibuprofen, and should avoid great swings in general diet. Avoid alcohol consumption. Call if any signs of abnormal bleeding. Patient reported a missed dose. Advised to take 3.5mg tonight, then resume same dosing regimen. Patient has hip surgery coming up in 2 weeks. He will bridge with lovenox. Scheduled patient for INR check 1 week post procedure. Advised to check with his surgeon's office about any other INR testing. He will call or send a wishkicker message if he needs another INR check prior to procedure.     Anticoagulation Summary  As of 2021    INR goal:  2.0-3.0   TTR:  67.5 % (1 y)   INR used for dosin.7 (2021)   Warfarin maintenance plan:  2.5 mg (2.5 mg x 1) every Mon, Wed, Fri; 3.5 mg (2.5 mg x 1 and 1 mg x 1) all other days   Weekly warfarin total:  21.5 mg   Plan last modified:  Keaton Duncan RN (2021)   Next INR check:  2021   Target end date:      Indications    Chronic deep vein thrombosis (DVT) of lower extremity ([facility] [I82.509]  Long term (current) use of anticoagulants [Z79.01]             Anticoagulation Episode Summary     INR check location:      Preferred lab:      Send INR reminders to:      Comments:        Anticoagulation Care Providers     Provider Role Specialty Phone number    Jarek Latham MD [facility] [phone]          Future Appointments   Date Time Provider Corinna Johnston   6/15/2021 11:30 AM [facility] PAT EXAM RM [facility]    2021  9:20 AM VERENICE ESTRADA AMB   2021  9:45 AM MD FATEMEH Quintero BS AMB   8/10/2021  2:00 PM VERENICE COSME BS AMB   8/10/2021  3:00 PM MD ROBLES Lawson BS AMB

## 2021-06-15 ENCOUNTER — HOSPITAL ENCOUNTER (OUTPATIENT)
Dept: PREADMISSION TESTING | Age: 81
Discharge: HOME | End: 2021-06-15
Payer: MEDICARE

## 2021-06-15 VITALS
TEMPERATURE: 98.1 F | WEIGHT: 197.97 LBS | HEART RATE: 61 BPM | DIASTOLIC BLOOD PRESSURE: 75 MMHG | SYSTOLIC BLOOD PRESSURE: 120 MMHG | HEIGHT: 70 IN | RESPIRATION RATE: 18 BRPM | BODY MASS INDEX: 28.34 KG/M2

## 2021-06-15 LAB
ABO + RH BLD: NORMAL
ANION GAP SERPL CALC-SCNC: 6 MMOL/L (ref 5–15)
APPEARANCE UR: CLEAR
BACTERIA URNS QL MICRO: NEGATIVE /HPF
BILIRUB UR QL: NEGATIVE
BLOOD GROUP ANTIBODIES SERPL: NORMAL
BUN SERPL-MCNC: 14 MG/DL (ref 6–20)
BUN/CREAT SERPL: 21 (ref 12–20)
CALCIUM SERPL-MCNC: 9.2 MG/DL (ref 8.5–10.1)
CHLORIDE SERPL-SCNC: 106 MMOL/L (ref 97–108)
CO2 SERPL-SCNC: 28 MMOL/L (ref 21–32)
COLOR UR: NORMAL
CREAT SERPL-MCNC: 0.68 MG/DL (ref 0.7–1.3)
EPITH CASTS URNS QL MICRO: NORMAL /LPF
ERYTHROCYTE [DISTWIDTH] IN BLOOD BY AUTOMATED COUNT: 12.8 % (ref 11.5–14.5)
EST. AVERAGE GLUCOSE BLD GHB EST-MCNC: 157 MG/DL
GLUCOSE SERPL-MCNC: 104 MG/DL (ref 65–100)
GLUCOSE UR STRIP.AUTO-MCNC: NEGATIVE MG/DL
HBA1C MFR BLD: 7.1 % (ref 4–5.6)
HCT VFR BLD AUTO: 37.3 % (ref 36.6–50.3)
HGB BLD-MCNC: 11.6 G/DL (ref 12.1–17)
HGB UR QL STRIP: NEGATIVE
INR PPP: 2 (ref 0.9–1.1)
KETONES UR QL STRIP.AUTO: NEGATIVE MG/DL
LEUKOCYTE ESTERASE UR QL STRIP.AUTO: NEGATIVE
MCH RBC QN AUTO: 33.2 PG (ref 26–34)
MCHC RBC AUTO-ENTMCNC: 31.1 G/DL (ref 30–36.5)
MCV RBC AUTO: 106.9 FL (ref 80–99)
NITRITE UR QL STRIP.AUTO: NEGATIVE
NRBC # BLD: 0 K/UL (ref 0–0.01)
NRBC BLD-RTO: 0 PER 100 WBC
PH UR STRIP: 7 [PH] (ref 5–8)
PLATELET # BLD AUTO: 233 K/UL (ref 150–400)
PMV BLD AUTO: 10 FL (ref 8.9–12.9)
POTASSIUM SERPL-SCNC: 5.2 MMOL/L (ref 3.5–5.1)
PROT UR STRIP-MCNC: NEGATIVE MG/DL
PROTHROMBIN TIME: 20.3 SEC (ref 9–11.1)
RBC # BLD AUTO: 3.49 M/UL (ref 4.1–5.7)
RBC #/AREA URNS HPF: NORMAL /HPF (ref 0–5)
SODIUM SERPL-SCNC: 140 MMOL/L (ref 136–145)
SP GR UR REFRACTOMETRY: 1.01 (ref 1–1.03)
SPECIMEN EXP DATE BLD: NORMAL
SPERM URNS QL MICRO: PRESENT
UA: UC IF INDICATED,UAUC: NORMAL
UROBILINOGEN UR QL STRIP.AUTO: 0.2 EU/DL (ref 0.2–1)
WBC # BLD AUTO: 5.9 K/UL (ref 4.1–11.1)
WBC URNS QL MICRO: NORMAL /HPF (ref 0–4)

## 2021-06-15 PROCEDURE — 86901 BLOOD TYPING SEROLOGIC RH(D): CPT

## 2021-06-15 PROCEDURE — 83036 HEMOGLOBIN GLYCOSYLATED A1C: CPT

## 2021-06-15 PROCEDURE — 80048 BASIC METABOLIC PNL TOTAL CA: CPT

## 2021-06-15 PROCEDURE — 81001 URINALYSIS AUTO W/SCOPE: CPT

## 2021-06-15 PROCEDURE — 85610 PROTHROMBIN TIME: CPT

## 2021-06-15 PROCEDURE — 85027 COMPLETE CBC AUTOMATED: CPT

## 2021-06-15 PROCEDURE — 36415 COLL VENOUS BLD VENIPUNCTURE: CPT

## 2021-06-15 RX ORDER — ATORVASTATIN CALCIUM 10 MG/1
10 TABLET, FILM COATED ORAL DAILY
Qty: 90 TABLET | Refills: 1 | Status: SHIPPED | OUTPATIENT
Start: 2021-06-15 | End: 2021-12-06 | Stop reason: SDUPTHER

## 2021-06-15 RX ORDER — TRAMADOL HYDROCHLORIDE 50 MG/1
50 TABLET ORAL DAILY
COMMUNITY
End: 2021-06-25

## 2021-06-15 RX ORDER — IRON 18 MG
1 TABLET ORAL
COMMUNITY

## 2021-06-15 NOTE — PERIOP NOTES
PRE-OPERATIVE INSTRUCTIONS REVIEWED WITH PATIENT. PT GIVEN TIME TO ASK QUESTIONS   PATIENT GIVEN 2-BOTTLE OF CHG SOAP. REVIEWED .   MRSA / MSSA TREATMENT INSTRUCTION SHEET GIVEN        INSTRUCTIONS GIVEN AND REVIEWED ON ADMISSION STATUS     MODERNA 03/07/21 & 04/04/21

## 2021-06-15 NOTE — TELEPHONE ENCOUNTER
Last Visit: 4/28/21 MD Emely Hummel, last lipid 11/11/20  Next Appointment: 7/28/21 MD Emely Hummel  Previous Refill Encounter(s): 12/23/20 90 + 1    Requested Prescriptions     Pending Prescriptions Disp Refills    atorvastatin (LIPITOR) 10 mg tablet 90 Tablet 1     Sig: Take 1 Tablet by mouth daily.

## 2021-06-16 NOTE — PERIOP NOTES
PAT Nurse Practitioner   Pre-Operative Chart Review/Assessment:-ORTHOPEDIC                Patient Name:  Man Oseguera                                                           Age:   68 y.o.    :  1940     Today's Date:  2021     Date of PAT:   6/15/2021      Date of Surgery:    2021      Procedure(s):  Left Total Hip Arthroplasty     Surgeon:   Dr. Juan Shah                       PLAN:      1)  Medical Clearance:  Dr. Jesus Alberts. PAT labs routed to PCP for continuity of care. 2)  Cardiac Clearance: Followed by Dr. Trang Ingram. LOV 21. No new symptoms or changes to current regimen. No further cardiac testing requested. Pt on coumadin for Chronic DVT and PAF/flutter. INR-2.0 on PAT labs. PT/INR ordered for pre-op DOS. 3)  Diabetic Treatment Consult:  Not indicated. A1c-7. 1. No noted hx of DM. Results sent to PCP and surgeon's offices. 4)  Sleep Apnea evaluation:   + ISABELLA dx. Pt uses CPAP. 5) Treatment for MRSA/Staph Aureus:  Neg      6) Additional Concerns:  Polymyalgia rheumatica, PAF/flutter, Hx DVT/PE, anxiety, [place] R side    Pt K+ 5.2.  BMP ordered for pre-op DOS to recheck K+                Vital Signs:         Vitals:    06/15/21 1204   BP: 120/75   Pulse: 61   Resp: 18   Temp: 98.1 °F (36.7 °C)   Weight: 89.8 kg (197 lb 15.6 oz)   Height: 5' 10\" (1.778 m)            ____________________________________________  PAST MEDICAL HISTORY  Past Medical History:   Diagnosis Date    Acoustic neuroma ([address] 2020    Basal cell adenocarcinoma     Chronic deep vein thrombosis (DVT) of lower extremity ([address] 2020    1.8 to 2.5 is goal    COVID-19 vaccine series completed     MODERNA 21 & 21    Hearing loss, right     Hypercholesteremia 2020    Lipitor 20mg    Melanoma in situ ([address]     TOP OF HEAD AND ON OUTSIDE OF LEFT KNEE    ISABELLA on CPAP 2020    Paroxysmal tachycardia, unspecified ([address] 2020    Sotalgraciela Saw Aly Alcazar MD     Polymyalgia rheumatica (HCC)     Psychiatric disorder     MILD ANXIETY     Skin cancer     SQUAMUS CELL       ____________________________________________  PAST SURGICAL HISTORY  Past Surgical History:   Procedure Laterality Date    HX COLONOSCOPY      hx of GI bleed    HX ENDOSCOPY      HX GI      EXCISION IN COLONON     HX HERNIA REPAIR      X3    HX ORTHOPAEDIC Right     SHOULDER     HX TONSILLECTOMY      HX WISDOM TEETH EXTRACTION        ____________________________________________  HOME MEDICATIONS  Current Outpatient Medications   Medication Sig    iron 18 mg tab Take 1 mg by mouth.  Lactobacillus acidophilus (PROBIOTIC PO) Take 15 billion cell by mouth daily.  digestive enzymes cap Take 220 mg by mouth daily.  OTHER,NON-FORMULARY, Take 10 mg by mouth daily. HEMP EXTRACT GUMMIES    traMADoL (ULTRAM) 50 mg tablet Take 50 mg by mouth daily.  warfarin (COUMADIN) 1 mg tablet 3.5 mg daily except M/W/F take 2.5 mg    warfarin (Coumadin) 2.5 mg tablet 3.5 mg daily except M/W/F take 2.5 mg    allopurinoL (ZYLOPRIM) 100 mg tablet Take 0.5 Tabs by mouth daily.  sotaloL (BETAPACE) 80 mg tablet Take 1 Tab by mouth two (2) times a day.  PARoxetine (PaxiL) 20 mg tablet Take 1 Tab by mouth two (2) times a day.  b complex-folic acid 0.4 mg tablet Take 1 Tab by mouth daily.  Comp. Stocking,Knee,Regular,Lrg (Relief Knee Open Toe Stocking) misc Use daily for lower extremity swelling, Size large 30-40mm, Model # [id number]    ascorbic acid, vitamin C, (VITAMIN C) 500 mg tablet Take  by mouth.  gluc case/chondro case A/vit C/Mn (GLUCOSAMINE 1500 COMPLEX PO) Take  by mouth.  folic acid (FOLVITE) 1 mg tablet Take  by mouth daily.  cholecalciferol (VITAMIN D3) (2,000 UNITS /50 MCG) cap capsule Take 2,000 Units by mouth daily.  magnesium 250 mg tab Take  by mouth.  calcium carbonate (CALCIUM 500 PO) Take  by mouth.  turmeric (CURCUMIN) 1 Tablet by Does Not Apply route daily.     atorvastatin (LIPITOR) 10 mg tablet Take 1 Tablet by mouth daily. No current facility-administered medications for this encounter.      ____________________________________________  ALLERGIES  Allergies   Allergen Reactions    Sulfa (Sulfonamide Antibiotics) Rash      ____________________________________________  SOCIAL HISTORY  Social History     Tobacco Use    Smoking status: Former Smoker     Years: 12.00     Types: Pipe     Quit date: 1/15/1981     Years since quittin.4    Smokeless tobacco: Never Used    Tobacco comment: PIPE   Substance Use Topics    Alcohol use: Yes     Alcohol/week: 21.0 standard drinks     Types: 7 Glasses of wine, 7 Cans of beer, 7 Shots of liquor per week     Comment: HAS ONE OR THE OTHER ALTERNATES A DIFFERENT ON EACH DAY       ____________________________________________        Labs:     Hospital Outpatient Visit on 06/15/2021   Component Date Value Ref Range Status    Sodium 06/15/2021 140  136 - 145 mmol/L Final    Potassium 06/15/2021 5.2* 3.5 - 5.1 mmol/L Final    Chloride 06/15/2021 106  97 - 108 mmol/L Final    CO2 06/15/2021 28  21 - 32 mmol/L Final    Anion gap 06/15/2021 6  5 - 15 mmol/L Final    Glucose 06/15/2021 104* 65 - 100 mg/dL Final    BUN 06/15/2021 14  6 - 20 MG/DL Final    Creatinine 06/15/2021 0.68* 0.70 - 1.30 MG/DL Final    BUN/Creatinine ratio 06/15/2021 21* 12 - 20   Final    GFR est AA 06/15/2021 >60  >60 ml/min/1.73m2 Final    GFR est non-AA 06/15/2021 >60  >60 ml/min/1.73m2 Final    Estimated GFR is calculated using the IDMS-traceable Modification of Diet in Renal Disease (MDRD) Study equation, reported for both  Americans (GFRAA) and non- Americans (GFRNA), and normalized to 1.73m2 body surface area. The physician must decide which value applies to the patient.     Calcium 06/15/2021 9.2  8.5 - 10.1 MG/DL Final    WBC 06/15/2021 5.9  4.1 - 11.1 K/uL Final    RBC 06/15/2021 3.49* 4.10 - 5.70 M/uL Final    HGB 06/15/2021 11.6* 12.1 - 17.0 g/dL Final    HCT 06/15/2021 37.3  36.6 - 50.3 % Final    MCV 06/15/2021 106.9* 80.0 - 99.0 FL Final    MCH 06/15/2021 33.2  26.0 - 34.0 PG Final    MCHC 06/15/2021 31.1  30.0 - 36.5 g/dL Final    RDW 06/15/2021 12.8  11.5 - 14.5 % Final    PLATELET 75/83/8661 487  150 - 400 K/uL Final    MPV 06/15/2021 10.0  8.9 - 12.9 FL Final    NRBC 06/15/2021 0.0  0  WBC Final    ABSOLUTE NRBC 06/15/2021 0.00  0.00 - 0.01 K/uL Final    Crossmatch Expiration 06/15/2021 06/27/2021,2359   Final    ABO/Rh(D) 06/15/2021 B POSITIVE   Final    Antibody screen 06/15/2021 NEG   Final    INR 06/15/2021 2.0* 0.9 - 1.1   Final    Comment: A single therapeutic range for Vit K antagonists may not be optimal for all indications - see June, 2008 issue of Chest, American College of Chest Physicians Evidence-Based Clinical Practice Guidelines, 8th Edition. Instrument and technical errors have been ruled out      Prothrombin time 06/15/2021 20.3* 9.0 - 11.1 sec Final    Instrument and technical errors have been ruled out    Color 06/15/2021 DARK YELLOW    Final    Color Reference Range: Straw, Yellow or Dark Yellow    Appearance 06/15/2021 CLEAR  CLEAR   Final    Specific gravity 06/15/2021 1.011  1.003 - 1.030   Final    pH (UA) 06/15/2021 7.0  5.0 - 8.0   Final    Protein 06/15/2021 Negative  NEG mg/dL Final    Glucose 06/15/2021 Negative  NEG mg/dL Final    Ketone 06/15/2021 Negative  NEG mg/dL Final    Bilirubin 06/15/2021 Negative  NEG   Final    Blood 06/15/2021 Negative  NEG   Final    Urobilinogen 06/15/2021 0.2  0.2 - 1.0 EU/dL Final    Nitrites 06/15/2021 Negative  NEG   Final    Leukocyte Esterase 06/15/2021 Negative  NEG   Final    WBC 06/15/2021 0-4  0 - 4 /hpf Final    RBC 06/15/2021 0-5  0 - 5 /hpf Final    Epithelial cells 06/15/2021 FEW  FEW /lpf Final    Epithelial cell category consists of squamous cells and /or transitional urothelial cells.  Renal tubular cells, if present, are separately identified as such.  Bacteria 06/15/2021 Negative  NEG /hpf Final    UA:UC IF INDICATED 06/15/2021 CULTURE NOT INDICATED BY UA RESULT  CNI   Final    [place] 06/15/2021 PRESENT    Final    Hemoglobin A1c 06/15/2021 7.1* 4.0 - 5.6 % Final    Comment: NEW METHOD  PLEASE NOTE NEW REFERENCE RANGE  (NOTE)  HbA1C Interpretive Ranges  <5.7              Normal  5.7 - 6.4         Consider Prediabetes  >6.5              Consider Diabetes      Est. average glucose 06/15/2021 157  mg/dL Final    Special Requests: 06/15/2021 NO SPECIAL REQUESTS    Final    Culture result: 06/15/2021 MRSA NOT PRESENT    Final       Skin:     Denies open wounds, cuts, sores, rashes or other areas of concern in PAT assessment.           Юлия Hillman NP

## 2021-06-16 NOTE — PERIOP NOTES
ALL LAB RESULTS, PENDING NASAL SWAB RESULT FOR MRSA/MSSA, & EKG FAXED TO SURGEON VIA CC WITH NOTE ABT ABNORMAL LABS. LEFT VOICEMAIL MSG FOR LETICIA ALBERTS NURSE TO DR. TOBAR REGARDING ABNORMAL LABS. P,A,T, PHONE NUMBER PROVIDED.

## 2021-06-17 ENCOUNTER — PATIENT MESSAGE (OUTPATIENT)
Dept: CARDIOLOGY CLINIC | Age: 81
End: 2021-06-17

## 2021-06-17 ENCOUNTER — PATIENT MESSAGE (OUTPATIENT)
Dept: FAMILY MEDICINE CLINIC | Age: 81
End: 2021-06-17

## 2021-06-17 LAB
BACTERIA SPEC CULT: NORMAL
BACTERIA SPEC CULT: NORMAL
SERVICE CMNT-IMP: NORMAL

## 2021-06-17 RX ORDER — ACETAMINOPHEN 500 MG
1000 TABLET ORAL ONCE
Status: CANCELLED | OUTPATIENT
Start: 2021-06-17 | End: 2021-06-17

## 2021-06-17 NOTE — LETTER
6/18/2021 9:03 AM 
 
Mr. Mike Guzman [place] [address] [facility] [address] [address] Dr. Aimee Alcantar, 
 
Mr Missy Hudson is currently under the care of [address] [place], [place] [address] . He is low-intermediate risk for cardiac complications during non-cardiac surgery. No further cardiac evaluation is indicated at this time. Please do not hesitate to contact me with questions. Sincerely, Mehrdad Childers MD

## 2021-06-21 ENCOUNTER — TELEPHONE (OUTPATIENT)
Dept: FAMILY MEDICINE CLINIC | Age: 81
End: 2021-06-21

## 2021-06-21 DIAGNOSIS — Z79.01 ANTICOAGULATED ON COUMADIN: ICD-10-CM

## 2021-06-21 DIAGNOSIS — I82.5Z9 CHRONIC DEEP VEIN THROMBOSIS (DVT) OF DISTAL VEIN OF LOWER EXTREMITY, UNSPECIFIED LATERALITY (HCC): ICD-10-CM

## 2021-06-21 DIAGNOSIS — Z79.01 LONG TERM (CURRENT) USE OF ANTICOAGULANTS: Primary | ICD-10-CM

## 2021-06-21 RX ORDER — ENOXAPARIN SODIUM 100 MG/ML
40 INJECTION SUBCUTANEOUS EVERY 12 HOURS
Qty: 1.6 ML | Refills: 0 | Status: ON HOLD | OUTPATIENT
Start: 2021-06-21 | End: 2021-06-24 | Stop reason: SDUPTHER

## 2021-06-21 NOTE — TELEPHONE ENCOUNTER
Patient needs instruction in taking coumadin and lovenox and need to know what to do about the medication having surgery on Thursday 06/24/2021.     Requesting a call back    Best call back # [phone]

## 2021-06-21 NOTE — TELEPHONE ENCOUNTER
Good Evening,    I received a message about this patient's coumadin relative to his surgery that is scheduled on 6/24/21. I called and spoke with him. He states that his orthopedist did not discuss any recommendations for stopping coumadin or provide rx for Lovenox. However, his last dose of Coumadin was last night (6/20/21). It appears that the surgery was discussed at his last INR visit, but recommendations were not made for preoperative bridging. Given the time crunch, I have sent intermediate dose (40mg bid) lovenox to his pharmacy with last dose to be administered on Wednesday morning (the day before the surgery). Are you okay with this? Do you have any additional recommendations for preoperative bridging?     Daija Mendoza MD

## 2021-06-22 NOTE — TELEPHONE ENCOUNTER
Outbound call to patient to advised that letter has been faxed over to Dr. Stacey Castillo, was calling patient to get fax number but I have since retrieved it. Left voicemail for patient because was unable to reach at this time.

## 2021-06-24 ENCOUNTER — HOSPITAL ENCOUNTER (OUTPATIENT)
Age: 81
Discharge: HOME HEALTH | End: 2021-06-25
Attending: ORTHOPAEDIC SURGERY | Admitting: ORTHOPAEDIC SURGERY
Payer: MEDICARE

## 2021-06-24 ENCOUNTER — ANESTHESIA EVENT (OUTPATIENT)
Dept: SURGERY | Age: 81
End: 2021-06-24
Payer: MEDICARE

## 2021-06-24 ENCOUNTER — APPOINTMENT (OUTPATIENT)
Dept: GENERAL RADIOLOGY | Age: 81
End: 2021-06-24
Attending: PHYSICIAN ASSISTANT
Payer: MEDICARE

## 2021-06-24 ENCOUNTER — ANESTHESIA (OUTPATIENT)
Dept: SURGERY | Age: 81
End: 2021-06-24
Payer: MEDICARE

## 2021-06-24 DIAGNOSIS — I82.5Z9 CHRONIC DEEP VEIN THROMBOSIS (DVT) OF DISTAL VEIN OF LOWER EXTREMITY, UNSPECIFIED LATERALITY (HCC): ICD-10-CM

## 2021-06-24 DIAGNOSIS — M16.12 PRIMARY LOCALIZED OSTEOARTHRITIS OF LEFT HIP: Primary | ICD-10-CM

## 2021-06-24 DIAGNOSIS — Z79.01 ANTICOAGULATED ON COUMADIN: ICD-10-CM

## 2021-06-24 DIAGNOSIS — Z79.01 LONG TERM (CURRENT) USE OF ANTICOAGULANTS: ICD-10-CM

## 2021-06-24 LAB
GLUCOSE BLD STRIP.AUTO-MCNC: 127 MG/DL (ref 65–117)
GLUCOSE BLD STRIP.AUTO-MCNC: 147 MG/DL (ref 65–117)
GLUCOSE BLD STRIP.AUTO-MCNC: 172 MG/DL (ref 65–117)
GLUCOSE BLD STRIP.AUTO-MCNC: 199 MG/DL (ref 65–117)
INR BLD: 1.5 (ref 0.9–1.2)
SERVICE CMNT-IMP: ABNORMAL

## 2021-06-24 PROCEDURE — 77030002933 HC SUT MCRYL J&J -A: Performed by: ORTHOPAEDIC SURGERY

## 2021-06-24 PROCEDURE — 74011636637 HC RX REV CODE- 636/637: Performed by: PHYSICIAN ASSISTANT

## 2021-06-24 PROCEDURE — 76210000016 HC OR PH I REC 1 TO 1.5 HR: Performed by: ORTHOPAEDIC SURGERY

## 2021-06-24 PROCEDURE — 77030018547 HC SUT ETHBND1 J&J -B: Performed by: ORTHOPAEDIC SURGERY

## 2021-06-24 PROCEDURE — 74011000250 HC RX REV CODE- 250: Performed by: PHYSICIAN ASSISTANT

## 2021-06-24 PROCEDURE — 77030006822 HC BLD SAW SAG BRSM -B: Performed by: ORTHOPAEDIC SURGERY

## 2021-06-24 PROCEDURE — 77030033067 HC SUT PDO STRATFX SPIR J&J -B: Performed by: ORTHOPAEDIC SURGERY

## 2021-06-24 PROCEDURE — 97530 THERAPEUTIC ACTIVITIES: CPT

## 2021-06-24 PROCEDURE — 76010000171 HC OR TIME 2 TO 2.5 HR INTENSV-TIER 1: Performed by: ORTHOPAEDIC SURGERY

## 2021-06-24 PROCEDURE — 74011000250 HC RX REV CODE- 250: Performed by: ANESTHESIOLOGY

## 2021-06-24 PROCEDURE — 76060000036 HC ANESTHESIA 2.5 TO 3 HR: Performed by: ORTHOPAEDIC SURGERY

## 2021-06-24 PROCEDURE — 74011250637 HC RX REV CODE- 250/637: Performed by: PHYSICIAN ASSISTANT

## 2021-06-24 PROCEDURE — 77030018673: Performed by: ORTHOPAEDIC SURGERY

## 2021-06-24 PROCEDURE — 74011000250 HC RX REV CODE- 250: Performed by: ORTHOPAEDIC SURGERY

## 2021-06-24 PROCEDURE — 77030027138 HC INCENT SPIROMETER -A

## 2021-06-24 PROCEDURE — 73501 X-RAY EXAM HIP UNI 1 VIEW: CPT

## 2021-06-24 PROCEDURE — 77030007866 HC KT SPN ANES BBMI -B: Performed by: ANESTHESIOLOGY

## 2021-06-24 PROCEDURE — 97116 GAIT TRAINING THERAPY: CPT

## 2021-06-24 PROCEDURE — 97161 PT EVAL LOW COMPLEX 20 MIN: CPT

## 2021-06-24 PROCEDURE — 2709999900 HC NON-CHARGEABLE SUPPLY: Performed by: ORTHOPAEDIC SURGERY

## 2021-06-24 PROCEDURE — 85610 PROTHROMBIN TIME: CPT

## 2021-06-24 PROCEDURE — 82962 GLUCOSE BLOOD TEST: CPT

## 2021-06-24 PROCEDURE — 77030028907 HC WRP KNEE WO BGS SOLM -B

## 2021-06-24 PROCEDURE — 77030040922 HC BLNKT HYPOTHRM STRY -A

## 2021-06-24 PROCEDURE — 74011250636 HC RX REV CODE- 250/636: Performed by: NURSE ANESTHETIST, CERTIFIED REGISTERED

## 2021-06-24 PROCEDURE — C1776 JOINT DEVICE (IMPLANTABLE): HCPCS | Performed by: ORTHOPAEDIC SURGERY

## 2021-06-24 PROCEDURE — 77030003882 HC BIT DRL TWST BRSM -B: Performed by: ORTHOPAEDIC SURGERY

## 2021-06-24 PROCEDURE — 77030018723 HC ELCTRD BLD COVD -A: Performed by: ORTHOPAEDIC SURGERY

## 2021-06-24 PROCEDURE — 2709999900 HC NON-CHARGEABLE SUPPLY

## 2021-06-24 PROCEDURE — 77030031139 HC SUT VCRL2 J&J -A: Performed by: ORTHOPAEDIC SURGERY

## 2021-06-24 PROCEDURE — 74011000250 HC RX REV CODE- 250: Performed by: NURSE ANESTHETIST, CERTIFIED REGISTERED

## 2021-06-24 PROCEDURE — 77030005513 HC CATH URETH FOL11 MDII -B: Performed by: ORTHOPAEDIC SURGERY

## 2021-06-24 PROCEDURE — 77030018831 HC SOL IRR H20 BAXT -A: Performed by: ORTHOPAEDIC SURGERY

## 2021-06-24 PROCEDURE — 74011250636 HC RX REV CODE- 250/636: Performed by: PHYSICIAN ASSISTANT

## 2021-06-24 PROCEDURE — 77030010507 HC ADH SKN DERMBND J&J -B: Performed by: ORTHOPAEDIC SURGERY

## 2021-06-24 PROCEDURE — 77030036660

## 2021-06-24 DEVICE — EMPOWR ACET SYSTEM, CUP, HEMISPHERICAL, CLUSTER HOLE, 56MM
Type: IMPLANTABLE DEVICE | Site: HIP | Status: FUNCTIONAL
Brand: DJO SURGICAL

## 2021-06-24 DEVICE — TAPERFILL HIP STEM, STANDARD, SIZE 14
Type: IMPLANTABLE DEVICE | Site: HIP | Status: FUNCTIONAL
Brand: DJO SURGICAL

## 2021-06-24 DEVICE — EMPOWR ACETABULAR, BONE SCREW, 40MM
Type: IMPLANTABLE DEVICE | Site: HIP | Status: FUNCTIONAL
Brand: DJO SURGICAL

## 2021-06-24 DEVICE — IMPL CAPPED HIP H2 TOTAL ADV OTHER HEAD DJO: Type: IMPLANTABLE DEVICE | Status: FUNCTIONAL

## 2021-06-24 DEVICE — EMPOWR ACETABULAR SYSTEM, LINER, NEUTRAL, HXE+, 40H
Type: IMPLANTABLE DEVICE | Site: HIP | Status: FUNCTIONAL
Brand: DJO SURGICAL

## 2021-06-24 DEVICE — HEAD, FEMORAL, CERAMIC, BILOX DELTA, 40MM +4.0
Type: IMPLANTABLE DEVICE | Site: HIP | Status: FUNCTIONAL
Brand: DJO SURGICAL

## 2021-06-24 RX ORDER — ALLOPURINOL 100 MG/1
50 TABLET ORAL DAILY
Status: DISCONTINUED | OUTPATIENT
Start: 2021-06-24 | End: 2021-06-25 | Stop reason: HOSPADM

## 2021-06-24 RX ORDER — SODIUM CHLORIDE 9 MG/ML
25 INJECTION, SOLUTION INTRAVENOUS CONTINUOUS
Status: DISCONTINUED | OUTPATIENT
Start: 2021-06-24 | End: 2021-06-24 | Stop reason: HOSPADM

## 2021-06-24 RX ORDER — TRANEXAMIC ACID 100 MG/ML
INJECTION, SOLUTION INTRAVENOUS AS NEEDED
Status: DISCONTINUED | OUTPATIENT
Start: 2021-06-24 | End: 2021-06-24 | Stop reason: HOSPADM

## 2021-06-24 RX ORDER — ONDANSETRON 2 MG/ML
4 INJECTION INTRAMUSCULAR; INTRAVENOUS AS NEEDED
Status: DISCONTINUED | OUTPATIENT
Start: 2021-06-24 | End: 2021-06-24 | Stop reason: HOSPADM

## 2021-06-24 RX ORDER — MORPHINE SULFATE 2 MG/ML
2 INJECTION, SOLUTION INTRAMUSCULAR; INTRAVENOUS
Status: DISCONTINUED | OUTPATIENT
Start: 2021-06-24 | End: 2021-06-24 | Stop reason: HOSPADM

## 2021-06-24 RX ORDER — SODIUM CHLORIDE, SODIUM LACTATE, POTASSIUM CHLORIDE, CALCIUM CHLORIDE 600; 310; 30; 20 MG/100ML; MG/100ML; MG/100ML; MG/100ML
INJECTION, SOLUTION INTRAVENOUS
Status: DISCONTINUED | OUTPATIENT
Start: 2021-06-24 | End: 2021-06-24 | Stop reason: HOSPADM

## 2021-06-24 RX ORDER — INSULIN LISPRO 100 [IU]/ML
INJECTION, SOLUTION INTRAVENOUS; SUBCUTANEOUS
Status: DISCONTINUED | OUTPATIENT
Start: 2021-06-24 | End: 2021-06-25 | Stop reason: HOSPADM

## 2021-06-24 RX ORDER — PROPOFOL 10 MG/ML
INJECTION, EMULSION INTRAVENOUS
Status: DISCONTINUED | OUTPATIENT
Start: 2021-06-24 | End: 2021-06-24 | Stop reason: HOSPADM

## 2021-06-24 RX ORDER — LIDOCAINE HYDROCHLORIDE 10 MG/ML
0.1 INJECTION, SOLUTION EPIDURAL; INFILTRATION; INTRACAUDAL; PERINEURAL AS NEEDED
Status: DISCONTINUED | OUTPATIENT
Start: 2021-06-24 | End: 2021-06-24 | Stop reason: HOSPADM

## 2021-06-24 RX ORDER — AMOXICILLIN 250 MG
1 CAPSULE ORAL 2 TIMES DAILY
Status: DISCONTINUED | OUTPATIENT
Start: 2021-06-24 | End: 2021-06-25 | Stop reason: HOSPADM

## 2021-06-24 RX ORDER — SODIUM CHLORIDE, SODIUM LACTATE, POTASSIUM CHLORIDE, CALCIUM CHLORIDE 600; 310; 30; 20 MG/100ML; MG/100ML; MG/100ML; MG/100ML
100 INJECTION, SOLUTION INTRAVENOUS CONTINUOUS
Status: DISCONTINUED | OUTPATIENT
Start: 2021-06-24 | End: 2021-06-24 | Stop reason: HOSPADM

## 2021-06-24 RX ORDER — SODIUM CHLORIDE, SODIUM LACTATE, POTASSIUM CHLORIDE, CALCIUM CHLORIDE 600; 310; 30; 20 MG/100ML; MG/100ML; MG/100ML; MG/100ML
25 INJECTION, SOLUTION INTRAVENOUS CONTINUOUS
Status: DISCONTINUED | OUTPATIENT
Start: 2021-06-24 | End: 2021-06-24 | Stop reason: HOSPADM

## 2021-06-24 RX ORDER — FENTANYL CITRATE 50 UG/ML
25 INJECTION, SOLUTION INTRAMUSCULAR; INTRAVENOUS
Status: DISCONTINUED | OUTPATIENT
Start: 2021-06-24 | End: 2021-06-24 | Stop reason: HOSPADM

## 2021-06-24 RX ORDER — ONDANSETRON 2 MG/ML
4 INJECTION INTRAMUSCULAR; INTRAVENOUS
Status: DISCONTINUED | OUTPATIENT
Start: 2021-06-24 | End: 2021-06-24

## 2021-06-24 RX ORDER — MIDAZOLAM HYDROCHLORIDE 1 MG/ML
1 INJECTION, SOLUTION INTRAMUSCULAR; INTRAVENOUS AS NEEDED
Status: DISCONTINUED | OUTPATIENT
Start: 2021-06-24 | End: 2021-06-24 | Stop reason: HOSPADM

## 2021-06-24 RX ORDER — SODIUM CHLORIDE 0.9 % (FLUSH) 0.9 %
5-40 SYRINGE (ML) INJECTION AS NEEDED
Status: DISCONTINUED | OUTPATIENT
Start: 2021-06-24 | End: 2021-06-24 | Stop reason: HOSPADM

## 2021-06-24 RX ORDER — SOTALOL HYDROCHLORIDE 80 MG/1
80 TABLET ORAL 2 TIMES DAILY
Status: DISCONTINUED | OUTPATIENT
Start: 2021-06-24 | End: 2021-06-25 | Stop reason: HOSPADM

## 2021-06-24 RX ORDER — OXYCODONE HYDROCHLORIDE 5 MG/1
5 TABLET ORAL AS NEEDED
Status: DISCONTINUED | OUTPATIENT
Start: 2021-06-24 | End: 2021-06-24 | Stop reason: HOSPADM

## 2021-06-24 RX ORDER — SODIUM CHLORIDE 0.9 % (FLUSH) 0.9 %
5-40 SYRINGE (ML) INJECTION EVERY 8 HOURS
Status: DISCONTINUED | OUTPATIENT
Start: 2021-06-24 | End: 2021-06-24 | Stop reason: HOSPADM

## 2021-06-24 RX ORDER — CEFAZOLIN SODIUM 1 G/3ML
INJECTION, POWDER, FOR SOLUTION INTRAMUSCULAR; INTRAVENOUS AS NEEDED
Status: DISCONTINUED | OUTPATIENT
Start: 2021-06-24 | End: 2021-06-24 | Stop reason: HOSPADM

## 2021-06-24 RX ORDER — SODIUM CHLORIDE 0.9 % (FLUSH) 0.9 %
5-40 SYRINGE (ML) INJECTION AS NEEDED
Status: DISCONTINUED | OUTPATIENT
Start: 2021-06-24 | End: 2021-06-25 | Stop reason: HOSPADM

## 2021-06-24 RX ORDER — ACETAMINOPHEN 500 MG
1000 TABLET ORAL ONCE
Status: DISCONTINUED | OUTPATIENT
Start: 2021-06-24 | End: 2021-06-24 | Stop reason: HOSPADM

## 2021-06-24 RX ORDER — OXYCODONE HYDROCHLORIDE 5 MG/1
2.5 TABLET ORAL
Status: DISCONTINUED | OUTPATIENT
Start: 2021-06-24 | End: 2021-06-25 | Stop reason: HOSPADM

## 2021-06-24 RX ORDER — ROPIVACAINE HYDROCHLORIDE 5 MG/ML
30 INJECTION, SOLUTION EPIDURAL; INFILTRATION; PERINEURAL AS NEEDED
Status: DISCONTINUED | OUTPATIENT
Start: 2021-06-24 | End: 2021-06-24 | Stop reason: HOSPADM

## 2021-06-24 RX ORDER — LIDOCAINE HYDROCHLORIDE 20 MG/ML
INJECTION, SOLUTION EPIDURAL; INFILTRATION; INTRACAUDAL; PERINEURAL AS NEEDED
Status: DISCONTINUED | OUTPATIENT
Start: 2021-06-24 | End: 2021-06-24 | Stop reason: HOSPADM

## 2021-06-24 RX ORDER — ACETAMINOPHEN 500 MG
500 TABLET ORAL EVERY 4 HOURS
Qty: 100 TABLET | Refills: 0 | Status: SHIPPED
Start: 2021-06-24

## 2021-06-24 RX ORDER — SODIUM CHLORIDE 9 MG/ML
125 INJECTION, SOLUTION INTRAVENOUS CONTINUOUS
Status: DISPENSED | OUTPATIENT
Start: 2021-06-24 | End: 2021-06-25

## 2021-06-24 RX ORDER — ACETAMINOPHEN 325 MG/1
650 TABLET ORAL ONCE
Status: DISCONTINUED | OUTPATIENT
Start: 2021-06-24 | End: 2021-06-24 | Stop reason: DRUGHIGH

## 2021-06-24 RX ORDER — HYDROMORPHONE HYDROCHLORIDE 1 MG/ML
0.2 INJECTION, SOLUTION INTRAMUSCULAR; INTRAVENOUS; SUBCUTANEOUS
Status: DISCONTINUED | OUTPATIENT
Start: 2021-06-24 | End: 2021-06-24 | Stop reason: HOSPADM

## 2021-06-24 RX ORDER — ATORVASTATIN CALCIUM 10 MG/1
10 TABLET, FILM COATED ORAL DAILY
Status: DISCONTINUED | OUTPATIENT
Start: 2021-06-24 | End: 2021-06-25 | Stop reason: HOSPADM

## 2021-06-24 RX ORDER — SODIUM CHLORIDE 0.9 % (FLUSH) 0.9 %
5-40 SYRINGE (ML) INJECTION EVERY 8 HOURS
Status: DISCONTINUED | OUTPATIENT
Start: 2021-06-24 | End: 2021-06-25 | Stop reason: HOSPADM

## 2021-06-24 RX ORDER — OXYCODONE HYDROCHLORIDE 5 MG/1
2.5-5 TABLET ORAL
Qty: 42 TABLET | Refills: 0 | Status: SHIPPED | OUTPATIENT
Start: 2021-06-24 | End: 2021-07-01

## 2021-06-24 RX ORDER — MIDAZOLAM HYDROCHLORIDE 1 MG/ML
0.5 INJECTION, SOLUTION INTRAMUSCULAR; INTRAVENOUS
Status: DISCONTINUED | OUTPATIENT
Start: 2021-06-24 | End: 2021-06-24 | Stop reason: HOSPADM

## 2021-06-24 RX ORDER — AMOXICILLIN 250 MG
1 CAPSULE ORAL
Qty: 60 TABLET | Refills: 0 | Status: SHIPPED | OUTPATIENT
Start: 2021-06-24

## 2021-06-24 RX ORDER — HYDROXYZINE HYDROCHLORIDE 10 MG/1
10 TABLET, FILM COATED ORAL
Status: DISCONTINUED | OUTPATIENT
Start: 2021-06-24 | End: 2021-06-25 | Stop reason: HOSPADM

## 2021-06-24 RX ORDER — ACETAMINOPHEN 500 MG
500 TABLET ORAL
Status: DISCONTINUED | OUTPATIENT
Start: 2021-06-24 | End: 2021-06-25 | Stop reason: HOSPADM

## 2021-06-24 RX ORDER — DIPHENHYDRAMINE HYDROCHLORIDE 50 MG/ML
12.5 INJECTION, SOLUTION INTRAMUSCULAR; INTRAVENOUS AS NEEDED
Status: DISCONTINUED | OUTPATIENT
Start: 2021-06-24 | End: 2021-06-24 | Stop reason: HOSPADM

## 2021-06-24 RX ORDER — ENOXAPARIN SODIUM 100 MG/ML
40 INJECTION SUBCUTANEOUS EVERY 12 HOURS
Qty: 3.2 ML | Refills: 0 | Status: SHIPPED | OUTPATIENT
Start: 2021-06-24 | End: 2021-06-28

## 2021-06-24 RX ORDER — NALOXONE HYDROCHLORIDE 0.4 MG/ML
0.4 INJECTION, SOLUTION INTRAMUSCULAR; INTRAVENOUS; SUBCUTANEOUS AS NEEDED
Status: DISCONTINUED | OUTPATIENT
Start: 2021-06-24 | End: 2021-06-25 | Stop reason: HOSPADM

## 2021-06-24 RX ORDER — BUPIVACAINE HYDROCHLORIDE 5 MG/ML
INJECTION, SOLUTION EPIDURAL; INTRACAUDAL
Status: COMPLETED | OUTPATIENT
Start: 2021-06-24 | End: 2021-06-24

## 2021-06-24 RX ORDER — PAROXETINE HYDROCHLORIDE 20 MG/1
20 TABLET, FILM COATED ORAL 2 TIMES DAILY
Status: DISCONTINUED | OUTPATIENT
Start: 2021-06-24 | End: 2021-06-25 | Stop reason: HOSPADM

## 2021-06-24 RX ORDER — OXYCODONE HYDROCHLORIDE 5 MG/1
5 TABLET ORAL
Status: DISCONTINUED | OUTPATIENT
Start: 2021-06-24 | End: 2021-06-25 | Stop reason: HOSPADM

## 2021-06-24 RX ORDER — POLYETHYLENE GLYCOL 3350 17 G/17G
17 POWDER, FOR SOLUTION ORAL DAILY
Status: DISCONTINUED | OUTPATIENT
Start: 2021-06-24 | End: 2021-06-25 | Stop reason: HOSPADM

## 2021-06-24 RX ORDER — FACIAL-BODY WIPES
10 EACH TOPICAL DAILY PRN
Status: DISCONTINUED | OUTPATIENT
Start: 2021-06-26 | End: 2021-06-25 | Stop reason: HOSPADM

## 2021-06-24 RX ORDER — ENOXAPARIN SODIUM 100 MG/ML
40 INJECTION SUBCUTANEOUS EVERY 12 HOURS
Status: DISCONTINUED | OUTPATIENT
Start: 2021-06-25 | End: 2021-06-25 | Stop reason: HOSPADM

## 2021-06-24 RX ORDER — FENTANYL CITRATE 50 UG/ML
50 INJECTION, SOLUTION INTRAMUSCULAR; INTRAVENOUS AS NEEDED
Status: DISCONTINUED | OUTPATIENT
Start: 2021-06-24 | End: 2021-06-24 | Stop reason: HOSPADM

## 2021-06-24 RX ORDER — KETOROLAC TROMETHAMINE 30 MG/ML
15 INJECTION, SOLUTION INTRAMUSCULAR; INTRAVENOUS EVERY 6 HOURS
Status: COMPLETED | OUTPATIENT
Start: 2021-06-24 | End: 2021-06-25

## 2021-06-24 RX ORDER — MIDAZOLAM HYDROCHLORIDE 1 MG/ML
INJECTION, SOLUTION INTRAMUSCULAR; INTRAVENOUS AS NEEDED
Status: DISCONTINUED | OUTPATIENT
Start: 2021-06-24 | End: 2021-06-24 | Stop reason: HOSPADM

## 2021-06-24 RX ORDER — HYDROMORPHONE HYDROCHLORIDE 1 MG/ML
0.5 INJECTION, SOLUTION INTRAMUSCULAR; INTRAVENOUS; SUBCUTANEOUS
Status: ACTIVE | OUTPATIENT
Start: 2021-06-24 | End: 2021-06-25

## 2021-06-24 RX ORDER — DEXTROSE 50 % IN WATER (D50W) INTRAVENOUS SYRINGE
25-50 AS NEEDED
Status: DISCONTINUED | OUTPATIENT
Start: 2021-06-24 | End: 2021-06-25 | Stop reason: HOSPADM

## 2021-06-24 RX ORDER — PROCHLORPERAZINE MALEATE 5 MG
5 TABLET ORAL
Status: DISCONTINUED | OUTPATIENT
Start: 2021-06-24 | End: 2021-06-25 | Stop reason: HOSPADM

## 2021-06-24 RX ORDER — MAGNESIUM SULFATE 100 %
4 CRYSTALS MISCELLANEOUS AS NEEDED
Status: DISCONTINUED | OUTPATIENT
Start: 2021-06-24 | End: 2021-06-25 | Stop reason: HOSPADM

## 2021-06-24 RX ADMIN — MIDAZOLAM 1 MG: 1 INJECTION INTRAMUSCULAR; INTRAVENOUS at 07:32

## 2021-06-24 RX ADMIN — INSULIN LISPRO 2 UNITS: 100 INJECTION, SOLUTION INTRAVENOUS; SUBCUTANEOUS at 12:32

## 2021-06-24 RX ADMIN — CEFAZOLIN 2 G: 1 INJECTION, POWDER, FOR SOLUTION INTRAMUSCULAR; INTRAVENOUS at 16:04

## 2021-06-24 RX ADMIN — ACETAMINOPHEN 500 MG: 500 TABLET ORAL at 18:43

## 2021-06-24 RX ADMIN — LIDOCAINE HYDROCHLORIDE 100 MG: 20 INJECTION, SOLUTION EPIDURAL; INFILTRATION; INTRACAUDAL; PERINEURAL at 09:01

## 2021-06-24 RX ADMIN — PROPOFOL 65 MCG/KG/MIN: 10 INJECTION, EMULSION INTRAVENOUS at 07:59

## 2021-06-24 RX ADMIN — ACETAMINOPHEN 500 MG: 500 TABLET ORAL at 13:17

## 2021-06-24 RX ADMIN — KETOROLAC TROMETHAMINE 15 MG: 30 INJECTION, SOLUTION INTRAMUSCULAR; INTRAVENOUS at 18:43

## 2021-06-24 RX ADMIN — SODIUM CHLORIDE 125 ML/HR: 9 INJECTION, SOLUTION INTRAVENOUS at 10:30

## 2021-06-24 RX ADMIN — POLYETHYLENE GLYCOL 3350 17 G: 17 POWDER, FOR SOLUTION ORAL at 12:33

## 2021-06-24 RX ADMIN — CEFAZOLIN 2 G: 330 INJECTION, POWDER, FOR SOLUTION INTRAMUSCULAR; INTRAVENOUS at 08:09

## 2021-06-24 RX ADMIN — PAROXETINE HYDROCHLORIDE 20 MG: 20 TABLET, FILM COATED ORAL at 18:43

## 2021-06-24 RX ADMIN — DOCUSATE SODIUM 50 MG AND SENNOSIDES 8.6 MG 1 TABLET: 8.6; 5 TABLET, FILM COATED ORAL at 12:33

## 2021-06-24 RX ADMIN — ACETAMINOPHEN 500 MG: 500 TABLET ORAL at 23:04

## 2021-06-24 RX ADMIN — KETOROLAC TROMETHAMINE 15 MG: 30 INJECTION, SOLUTION INTRAMUSCULAR; INTRAVENOUS at 12:34

## 2021-06-24 RX ADMIN — SOTALOL HYDROCHLORIDE 80 MG: 80 TABLET ORAL at 18:43

## 2021-06-24 RX ADMIN — WARFARIN SODIUM 3.5 MG: 2.5 TABLET ORAL at 16:04

## 2021-06-24 RX ADMIN — MIDAZOLAM 1 MG: 1 INJECTION INTRAMUSCULAR; INTRAVENOUS at 07:43

## 2021-06-24 RX ADMIN — SODIUM CHLORIDE, POTASSIUM CHLORIDE, SODIUM LACTATE AND CALCIUM CHLORIDE: 600; 310; 30; 20 INJECTION, SOLUTION INTRAVENOUS at 07:25

## 2021-06-24 RX ADMIN — DOCUSATE SODIUM 50 MG AND SENNOSIDES 8.6 MG 1 TABLET: 8.6; 5 TABLET, FILM COATED ORAL at 18:43

## 2021-06-24 RX ADMIN — BUPIVACAINE HYDROCHLORIDE 10 MG: 5 INJECTION, SOLUTION EPIDURAL; INTRACAUDAL; PERINEURAL at 07:57

## 2021-06-24 NOTE — ROUTINE PROCESS
Patient: Bradley Ho MRN: [id number]  SSN: [id number]   YOB: 1940  Age: 68 y.o. Sex: male     Patient is status post Procedure(s):  LEFT TOTAL HIP ARTHROPLASTY (SPINAL W/BLOCK W/IV SEDATION).     Surgeon(s) and Role:     Ferdinand Naik MD - Primary    Local/Dose/Irrigation:  SEE MAR                  Peripheral IV 06/24/21 Left Arm (Active)                           Dressing/Packing:  Incision 06/24/21 Hip Left-Dressing/Treatment: Silver dressing;Skin glue (AQUACEL) (06/24/21 7677)    Splint/Cast:  ]    Other:  I/O CATH

## 2021-06-24 NOTE — PROGRESS NOTES
Problem: Mobility Impaired (Adult and Pediatric)  Goal: *Acute Goals and Plan of Care (Insert Text)  Description: FUNCTIONAL STATUS PRIOR TO ADMISSION: Patient was independent without use of DME - limited by pain, + limp. HOME SUPPORT PRIOR TO ADMISSION: The patient lived with spouse but did not require assist.    Physical Therapy Goals  Initiated 6/24/2021    1. Patient will move from supine to sit and sit to supine  and scoot up and down in bed with modified independence within 4 days. 2. Patient will perform sit to stand with modified independence within 4 days. 3. Patient will ambulate with modified independence for > 150 feet with the least restrictive device within 4 days. 4. Patient will ascend/descend 4 stairs with one handrail(s) with modified independence within 4 days. 5. Patient will perform THR home exercise program per protocol with supervision/set-up within 4 days. PHYSICAL THERAPY EVALUATION  Patient: Man Oseguera (5 y.o. male)  Date: 6/24/2021  Primary Diagnosis: Primary localized osteoarthritis of left hip [M16.12]  Procedure(s) (LRB):  LEFT TOTAL HIP ARTHROPLASTY (SPINAL W/BLOCK W/IV SEDATION) (Left) Day of Surgery   Precautions:WBAT    ASSESSMENT  Based on the objective data described below, the patient presents POD# 0 Left TOMY with left hip pain, decreased AROM/strength and function left hip, decline in functional mobility, decreased activity tolerance and impaired standing balance/gait without assistive device. Pt mobilized easily with minimal increase in left hip pain and stable VS.  Anticipate pt will be ready for discharge from PT standpoint after am session. Current Level of Function Impacting Discharge (mobility/balance): Standby assist for bed mobility, CGA for functional mobility with RW    Functional Outcome Measure: The patient scored 55/100 on the Barthel Index outcome measure. Other factors to consider for discharge:  Only 1 step to enter home, wife to assist at discharge     Patient will benefit from skilled therapy intervention to address the above noted impairments. PLAN :  Recommendations and Planned Interventions: bed mobility training, transfer training, gait training, therapeutic exercises, patient and family training/education and therapeutic activities      Frequency/Duration: Patient will be followed by physical therapy:  twice daily to address goals. Recommendation for discharge: (in order for the patient to meet his/her long term goals)  Physical therapy at least 2 days/week in the home     This discharge recommendation:  Has been made in collaboration with the attending provider and/or case management    IF patient discharges home will need the following DME: patient owns DME required for discharge         SUBJECTIVE:   Patient stated Stella Rose went thru this with my wife last August - she had her replacement.     OBJECTIVE DATA SUMMARY:   HISTORY:    Past Medical History:   Diagnosis Date    Acoustic neuroma ([facility] 02/18/2020    Basal cell adenocarcinoma     Chronic deep vein thrombosis (DVT) of lower extremity ([facility] 2/14/2020    1.8 to 2.5 is goal    COVID-19 vaccine series completed     MODERNA 03/07/21 & 04/04/21    Hearing loss, right     Hypercholesteremia 2/14/2020    Lipitor 20mg    Melanoma in situ ([address]     TOP OF HEAD AND ON OUTSIDE OF LEFT KNEE    ISABELLA on CPAP 2/18/2020    Paroxysmal tachycardia, unspecified ([address] 2/14/2020    Sotalol Saw Trip Lynn MD     Polymyalgia rheumatica [facility])     Psychiatric disorder     MILD ANXIETY     Skin cancer     SQUAMUS CELL      Past Surgical History:   Procedure Laterality Date    HX COLONOSCOPY      hx of GI bleed    HX ENDOSCOPY      HX GI      EXCISION IN COLONON     HX HERNIA REPAIR      X3    HX ORTHOPAEDIC Right     SHOULDER     HX TONSILLECTOMY      HX WISDOM TEETH EXTRACTION         Personal factors and/or comorbidities impacting plan of care: as above    [address] [facility] Environment: Private residence  # Steps to Enter: 1  One/Two Story Residence: Two story, live on 1st floor  Lift Chair Available: Yes  Living Alone: No  Support Systems: Spouse/Significant Other/Partner  Patient Expects to be Discharged to[de-identified] [employer]  Current DME Used/Available at Home: [employer], straight, Walker, rolling, Safety frame [employer], [employer], Shower chair  Tub or Shower Type: Shower    EXAMINATION/PRESENTATION/DECISION MAKING:   Critical Behavior:  Neurologic State: Alert  Orientation Level: Oriented X4  Cognition: Follows commands  Safety/Judgement: Awareness of environment  Skin:  Left hip surgical dressing in place, well adhered, no drainage noted    Range Of Motion:  AROM: Within functional limits (except left hip)                       Strength:    Strength: Within functional limits (except left hip)                    Tone & Sensation:   Tone: Normal              Sensation: Intact               Coordination:  Coordination: Within functional limits  Functional Mobility:  Bed Mobility:     Supine to Sit: Stand-by assistance  Sit to Supine: Stand-by assistance  Scooting: Stand-by assistance  Transfers:  Sit to Stand: Contact guard assistance  Stand to Sit: Contact guard assistance                       Balance:   Sitting: Intact; Without support  Standing: Impaired; Without support  Standing - Static: Good;Constant support  Standing - Dynamic : Good;Constant support  Ambulation/Gait Training:  Distance (ft): 60 Feet (ft)  Assistive Device: Walker, rolling;Gait belt  Ambulation - Level of Assistance: Contact guard assistance        Gait Abnormalities: Antalgic;Decreased step clearance  Right Side Weight Bearing: Full  Left Side Weight Bearing: As tolerated  Base of Support: Center of gravity altered;Shift to right  Stance: Left decreased  Speed/Marcy: Slow  Step Length: Right shortened;Left shortened  Swing Pattern: Left asymmetrical            Therapeutic Exercises:   Pt instructed and performed ankle pumps, quad sets, gluteal sets and heel slides - to perform 5 - 10 reps once hr when awake. Pt demonstrated proper use of incentive spirometer - to perform x 10 once hr when awake. Written instructions placed on pt's communication board. Functional Measure:  Barthel Index:    Bathin  Bladder: 10  Bowels: 10  Groomin  Dressin  Feeding: 10  Mobility: 0  Stairs: 0  Toilet Use: 5  Transfer (Bed to Chair and Back): 10  Total: 55/100       The Barthel ADL Index: Guidelines  1. The index should be used as a record of what a patient does, not as a record of what a patient could do. 2. The main aim is to establish degree of independence from any help, physical or verbal, however minor and for whatever reason. 3. The need for supervision renders the patient not independent. 4. A patient's performance should be established using the best available evidence. Asking the patient, friends/relatives and nurses are the usual sources, but direct observation and common sense are also important. However direct testing is not needed. 5. Usually the patient's performance over the preceding 24-48 hours is important, but occasionally longer periods will be relevant. 6. Middle categories imply that the patient supplies over 50 per cent of the effort. 7. Use of aids to be independent is allowed. [place] M[place]., Barthel, DCONSTANCE. (9640). Functional evaluation: the Barthel Index. [facility] (14)2. JOSÉ MIGUEL Bailey, Donna Cedeño., Merlin Ates., [place], [address] [place] [address] (). Measuring the change indisability after inpatient rehabilitation; comparison of the responsiveness of the Barthel Index and Functional [place] Measure. Journal of Neurology, Neurosurgery, and Psychiatry, 66(4), 612-280. Ryan Prieto, N.J.A, NILA Curtis, & Herbie Meade M.A. (2004.) Assessment of post-stroke quality of life in cost-effectiveness studies: The usefulness of the Barthel Index and the EuroQoL-5D.  Quality of Life Research, 13, 655-56          Physical Therapy Evaluation Charge Determination   History Examination Presentation Decision-Making   LOW Complexity : Zero comorbidities / personal factors that will impact the outcome / POC LOW Complexity : 1-2 Standardized tests and measures addressing body structure, function, activity limitation and / or participation in recreation  LOW Complexity : Stable, uncomplicated  LOW Complexity : FOTO score of       Based on the above components, the patient evaluation is determined to be of the following complexity level: LOW     Pain Rating:  Pre-treatment 2/10    Activity Tolerance:   Good - POD# 0 mobilized with stable VS.    After treatment patient left in no apparent distress:   Supine in bed and Call bell within reach    COMMUNICATION/EDUCATION:   The patients plan of care was discussed with: Registered nurse. Fall prevention education was provided and the patient/caregiver indicated understanding., Patient/family have participated as able in goal setting and plan of care. and Patient/family agree to work toward stated goals and plan of care.     Thank you for this referral.  Lesley Sahu, PT   Time Calculation: 35 mins

## 2021-06-24 NOTE — PROGRESS NOTES
TRANSFER - IN REPORT:    Verbal report received from [address] RN (name) on Madeline Vásquez  being received from PACU (unit) for routine post - op      Report consisted of patients Situation, Background, Assessment and   Recommendations(SBAR). Information from the following report(s) SBAR, OR Summary, Procedure Summary, MAR and Recent Results was reviewed with the receiving nurse. Opportunity for questions and clarification was provided. Assessment completed upon patients arrival to unit and care assumed.

## 2021-06-24 NOTE — PROGRESS NOTES
Pharmacist Note - Warfarin Dosing  Consult provided for this 80 y.o.male to manage warfarin for chronic DVT    INR Goal: 2 - 3 (per outpt anticoag visit notes)    Home regimen/ tablet size: 3.5 mg daily EXCEPT 2.5 mg on MWF    Drugs that may increase INR: None  Drugs that may decrease INR: None  Other current anticoagulants/ drugs that may increase bleeding risk: NSAIDS, lovenox (bridge), allopurinol  Risk factors: Age > 65  Daily INR ordered: NO- ordered daily per protocol    Recent Labs     06/24/21  0728   INR 1.5*     Date               INR                  Dose  6/24              1.5              3.5 mg                                                                                 Assessment/ Plan: Will order warfarin 3.5 mg PO x 1 dose. Pharmacy will continue to monitor daily and adjust therapy as indicated. Please contact the pharmacist at x [phone] or  for outpatient recommendations if needed.

## 2021-06-24 NOTE — ANESTHESIA POSTPROCEDURE EVALUATION
Post-Anesthesia Evaluation and Assessment    Patient: Bony Yeung MRN: [id number]  SSN: [id number]    YOB: 1940  Age: 68 y.o. Sex: male      I have evaluated the patient and they are stable and ready for discharge from the PACU. Cardiovascular Function/Vital Signs  Visit Vitals  BP (!) 106/56 (BP 1 Location: Right upper arm, BP Patient Position: At rest)   Pulse (!) 56   Temp 36.5 °C (97.7 °F)   Resp 18   Ht 5' 10\" (1.778 m)   Wt 85.7 kg (189 lb)   SpO2 99%   BMI 27.12 kg/m²       Patient is status post Spinal anesthesia for Procedure(s):  LEFT TOTAL HIP ARTHROPLASTY (SPINAL W/BLOCK W/IV SEDATION). Nausea/Vomiting: None    Postoperative hydration reviewed and adequate. Pain:  Pain Scale 1: Numeric (0 - 10) (06/24/21 1200)  Pain Intensity 1: 0 (06/24/21 1200)   Managed    Neurological Status:   Neuro (WDL): Exceptions to WDL (06/24/21 1030)  Neuro  Neurologic State: Alert (06/24/21 1200)  Orientation Level: Oriented X4 (06/24/21 1200)  Cognition: Follows commands (06/24/21 1200)  Speech: Clear (06/24/21 1200)  LUE Motor Response: Purposeful (06/24/21 1200)  LLE Motor Response: Purposeful;Weak;Numbness (Spinal) (06/24/21 1200)  RUE Motor Response: Purposeful (06/24/21 1200)  RLE Motor Response: Purposeful;Weak;Numbness (Spinal) (06/24/21 1200)   At baseline    Mental Status, Level of Consciousness: Alert and  oriented to person, place, and time    Pulmonary Status:   O2 Device: None (Room air) (06/24/21 1106)   Adequate oxygenation and airway patent    Complications related to anesthesia: None    Post-anesthesia assessment completed. No concerns    Signed By: Vijay Clayton MD     June 24, 2021              Procedure(s):  LEFT TOTAL HIP ARTHROPLASTY (SPINAL W/BLOCK W/IV SEDATION).     spinal    <BSHSIANPOST>    INITIAL Post-op Vital signs:   Vitals Value Taken Time   /67 06/24/21 1045   Temp 36.4 °C (97.6 °F) 06/24/21 1030   Pulse 50 06/24/21 1050   Resp 13 06/24/21 1050   SpO2 96 % 06/24/21 1050   Vitals shown include unvalidated device data.

## 2021-06-24 NOTE — BRIEF OP NOTE
Brief Postoperative Note    Patient: Joy Gould  YOB: 1940  MRN: [id number]    Date of Procedure: 6/24/2021     Pre-Op Diagnosis: OSTEOARTHRITIS LEFT HIP    Post-Op Diagnosis: Same as preoperative diagnosis. Procedure(s):  LEFT TOTAL HIP ARTHROPLASTY (SPINAL W/BLOCK W/IV SEDATION)    Surgeon(s):  Arabella Cameron MD    Surgical Assistant: Physician Assistant: Anurag Cooper PA-C  Surg Asst-1: Dereje ISLAS    Anesthesia: Spinal     Estimated Blood Loss (mL): 819     Complications: None    Specimens: * No specimens in log *     Implants:   Implant Name Type Inv.  Item Serial No.  Lot No. LRB No. Used Action   CUP ACET CLUS HOLE H 56 MM W/ DOME HOLE PLUG P2 COAT EMPOWR - SN/A  CUP ACET CLUS HOLE H 56 MM W/ DOME HOLE PLUG P2 COAT EMPOWR N/A [facility] [id number] Left 1 Implanted   STEM FEM STD OFFSET 14 MM HIP CMNTLS CLLRLSS TI P2 COAT - SN/A  STEM FEM STD OFFSET 14 MM HIP CMNTLS CLLRLSS TI P2 COAT N/A [facility] [id number] Left 1 Implanted   LINER ACET NEUT H 40X58 MM HIP HXE+ VIT E POLYETH EMPOWR - SN/A  LINER ACET NEUT H 40X58 MM HIP HXE+ VIT E POLYETH EMPOWR N/A [facility]_ [id number] Left 1 Implanted   SCREW BNE 40 MM ACET EMPOWR - SN/A  SCREW BNE 40 MM ACET EMPOWR N/A [facility]_ [id number] Left 1 Implanted   HEAD FEM 4+ MM 40 MM HIP OFFSET FOR FMP SYS BIOLOX DELT CERM - SN/A  HEAD FEM 4+ MM 40 MM HIP OFFSET FOR FMP SYS BIOLOX DELT CERM N/A [facility] SURGICAL_ [id number] Left 1 Implanted       Drains: * No LDAs found *    Findings: oa left hip    Electronically Signed by Marnie Lawrence PA-C on 6/24/2021 at 9:35 AM

## 2021-06-24 NOTE — DISCHARGE INSTRUCTIONS
Post-op Discharge Instructions Following Total Joint Replacement  Lizeth Addison MD  [address]  [phone], ext [phone]  Follow-up Office Visit   See Dr. Ashley Corona approximately 3-4 weeks from date of surgery. Call [phone], ext [phone] to make an appointment. Activity   Use your walker for ambulation. Weight bearing as tolerated unless instructed otherwise by the physical therapist. Get up every hour you are awake and take a brief walk. Lengthen walking distance daily as your strength improves.  Continue using your walker until seen in the office for your first follow up visit.  Practice your exercises 3 times daily as instructed by the physical therapist. Trinidad Alberto for 20 minutes after exercising.  No driving until seen in the office for your first follow up visit. Incision Care   The light brown Aquacel surgical dressing is waterproof and is to remain on your incision for 7 days. On the 7th day, carefully lift the edge of the dressing to break the adhesive seal and gently peel it off.  If your Aquacel dressings comes loose or falls off before the 7th day, replace it with a dry sterile gauze dressing and change this dressing daily. Once there is no drainage on the bandage, you mean leave the incision open to air.  You may take a shower with the Aquacel dressing in place. After you remove the Aquacel dressing on day 7, you may continue to shower and get your incision wet in the shower. Do not submerge your incision under water in a bathtub, hot tub, swimming pool, etc. until after you have been evaluated at your first office visit. Medications   Blood Clot Prevention: Resume preop dose of coumadin and continue Lovenox 40 mg twice daily for 4 days   Pain Management: Take pain medication as prescribed; wean yourself off of pain medication as your pain lessens. Take with food.  You make also take Tylenol every 4-6 hours as needed for pain. Do not exceed 3 grams (3000mg) per day.    Place an ice bag on or around the incision for 20 minutes on / 20 minutes off as needed throughout the day and night, especially after exercising.  Stool Softener: You may want to take a stool softener (such as Senokot-S or Colace) to prevent constipation while taking pain medication. If constipation occurs, you may also use a laxative (such as Dulcolax tablets, Miralax, or a suppository). Diet   Resume usual diet at home. Drink plenty of fluids. Eat foods high in fiber and protein. Calcium and Vitamin D supplements recommended. Avoid alcoholic beverages. No smoking. When to call your Orthopaedic Surgeon: If you call after 5pm or on a weekend, the on call physician will return your call   Pain that is not relieved by pain medication, ice, and activity modification   Signs of infection (red incision, continuous drainage from the incision, malodorous drainage, persistent fever greater than 101 degrees Fahrenheit)   Signs of a blood clot in your leg (calf pain, tenderness, redness, and/or swelling of the lower leg)  ?   When to call your Primary Care Physician   Concerns about your medical conditions such as diabetes, high blood pressure, asthma, congestive heart failure   Call if blood sugars are elevated, if you have a persistent headache or dizziness, coughing or congestion, constipation or diarrhea, burning with urination, abnormal heart rate (fast or slow)  When to call 911 and go to the nearest Emergency Room   Acute onset of chest pain, shortness of breath, difficulty breathing

## 2021-06-24 NOTE — PROGRESS NOTES
Bedside and Verbal shift change report given to Sam Odonnell RN (oncoming nurse) by Kandi Brown RN (offgoing nurse). Report included the following information SBAR, OR Summary, Procedure Summary, MAR and Recent Results.

## 2021-06-24 NOTE — PROGRESS NOTES
Ortho Post-Op Note    6/24/2021  1:54 PM    POD:  Day of Surgery  S/P:  Procedure(s):  LEFT TOTAL HIP ARTHROPLASTY (SPINAL W/BLOCK W/IV SEDATION)    Afebrile/VSS, NAD, A&O x 3  Doing well without complaints of nausea  Pain well controlled  Calves soft/NTTP Bilaterally  Thigh soft. Dressing clean and dry  Moving lower extremities well. Neurocirculatory exam intact and within normal range. Ambulated well with PT.  H/O DVT, A-fib- on warfarin PTA, alternating 2.5 and 1.5 mg (2-3 therapeutic range)    Lab Results   Component Value Date/Time    HGB 11.6 (L) 06/15/2021 12:08 PM    INR 2.0 (H) 06/15/2021 12:08 PM    INR (POC) 1.5 (H) 06/24/2021 07:28 AM     Recent Labs     06/15/21  1208 04/28/21  1110 04/28/21  1110 12/30/20  1120 12/30/20  1120 11/11/20  0754 11/11/20  0754   CREA 0.68*   < > 0.79   < > 0.82   < > 0.71   BUN 14  --  18  --  18  --  15    < > = values in this interval not displayed. Estimated Creatinine Clearance: 89.8 mL/min (A) (by C-G formula based on SCr of 0.68 mg/dL (L)).   Visit Vitals  BP (!) 99/59 (BP Patient Position: Standing)   Pulse 65   Temp 97.6 °F (36.4 °C)   Resp 17   Ht 5' 10\" (1.778 m)   Wt 85.7 kg (189 lb)   SpO2 98%   BMI 27.12 kg/m²       PLAN:  DVT prophylaxis: Lovenox 40 mg x doses, pharmacy to dose warfarin  WBAT with PT-mobilization  Pain Control: Tylenol,oxycodone  Plan to D/C home tomorrow      Aneta Crowley, [address] [facility], [address]    Department of Orthopaedics  [phone]

## 2021-06-24 NOTE — DISCHARGE SUMMARY
[address]     DISCHARGE SUMMARY     Name: Cruzito Sanchez       MR#: [id number]    : 1940  ADMIT DATE: 2021  DISCHARGE DATE: 2021     ADMISSION DIAGNOSIS: Primary localized osteoarthritis of left hip [M16.12]     DISCHARGE DIAGNOSIS: OSTEOARTHRITIS LEFT HIP     PROCEDURE PERFORMED: Procedure(s):  LEFT TOTAL HIP ARTHROPLASTY (SPINAL W/BLOCK W/IV SEDATION)     CONSULTATIONS:  None.     HISTORY OF PRESENT ILLNESS: The patient is a 53-year-old gentleman with progressive left hip and groin pain due to severe osteoarthritis. Symptoms have progressed despite comprehensive conservative treatment. He presents for left total hip replacement. Risks, benefits, alternatives of procedure reviewed with him in detail and he desires to proceed.     HOSPITAL COURSE:  The patient underwent the aforementioned procedure on date of admission under spinal anesthesia with adductor canal block. There were no immediate postoperative complications. He was started on a multimodal pain regimen and DVT prophylaxis.     DISPOSITION: The patient made slow, steady progress with physical therapy and was appropriate for discharge to Home in stable condition on postoperative day 1. DISCHARGE MEDICATIONS:  Reinitiate preadmission medications. In addition, the patient will be on Lovenox to coumadin bridge for DVT prophylaxis and low dose oxycodone and Tylenol for pain. DISCHARGE INSTRUCTIONS:  Detailed printed instructions were provided to the patient. Follow up with Dr. Patito Hector in approximately 3 weeks. The patient will receive home health physical therapy in the meantime.     Signed by: Venkatesh Moscoso PA-C  2021

## 2021-06-24 NOTE — PERIOP NOTES
TRANSFER - OUT REPORT:    Verbal report given to STEFANI Mckinney(name) on Gregg Garcia  being transferred to [facility](unit) for routine post - op       Report consisted of patients Situation, Background, Assessment and   Recommendations(SBAR). Time Pre op antibiotic given:8:09 am Ancef  Anesthesia Stop time: 9:56  Gruber Present on Transfer to floor:no  Order for Gruber on Chart:no  Discharge Prescriptions with Chart:no    Information from the following report(s) SBAR, Kardex, OR Summary, Procedure Summary, Intake/Output, MAR, Recent Results, Med Rec Status and Cardiac Rhythm SB was reviewed with the receiving nurse. Opportunity for questions and clarification was provided. Is the patient on 02? NO       L/Min        Other     Is the patient on a monitor? NO    Is the nurse transporting with the patient? NO    Surgical Waiting Area notified of patient's transfer from PACU?  YES      The following personal items collected during your admission accompanied patient upon transfer:   Dental Appliance: Dental Appliances: None  Vision:    Hearing Aid:    Jewelry:    Clothing:    Other Valuables:    Valuables sent to safe:

## 2021-06-24 NOTE — PROGRESS NOTES
Primary Nurse Edgar Barraza and Benji Ron, RN performed a dual skin assessment on this patient No impairment noted  Manuel score is 20

## 2021-06-24 NOTE — ANESTHESIA PREPROCEDURE EVALUATION
Relevant Problems   RESPIRATORY SYSTEM   (+) Chronic dyspnea   (+) ISABELLA on CPAP      CARDIOVASCULAR   (+) Atrial fibrillation (HCC)   (+) Heart murmur   (+) Paroxysmal tachycardia, unspecified (HCC)       Anesthetic History   No history of anesthetic complications            Review of Systems / Medical History  Patient summary reviewed, nursing notes reviewed and pertinent labs reviewed    Pulmonary        Sleep apnea: CPAP           Neuro/Psych         Psychiatric history     Cardiovascular            Dysrhythmias   Hyperlipidemia    Exercise tolerance: >4 METS     GI/Hepatic/Renal  Within defined limits              Endo/Other  Within defined limits           Other Findings   Comments: H/o DVT/PE on coumadin  PMR           Physical Exam    Airway  Mallampati: II  TM Distance: 4 - 6 cm  Neck ROM: normal range of motion   Mouth opening: Normal     Cardiovascular  Regular rate and rhythm,  S1 and S2 normal,  no murmur, click, rub, or gallop             Dental  No notable dental hx       Pulmonary  Breath sounds clear to auscultation               Abdominal  GI exam deferred       Other Findings            Anesthetic Plan    ASA: 3  Anesthesia type: spinal            Anesthetic plan and risks discussed with: Patient

## 2021-06-24 NOTE — ANESTHESIA PROCEDURE NOTES
Spinal Block    Start time: 6/24/2021 7:48 AM  End time: 6/24/2021 7:58 AM  Performed by: Humera Knapp CRNA  Authorized by:  Linwood Kan MD     Pre-procedure:  Preanesthetic Checklist: patient identified, risks and benefits discussed, anesthesia consent, site marked, patient being monitored and timeout performed    Timeout Time: 07:48 EDT          Spinal Block:   Patient Position:  Seated  Prep Region:  Lumbar  Prep: chlorhexidine and patient draped      Location:  L3-4  Technique:  Single shot        Needle:   Needle Type:  Pencil-tip  Needle Gauge:  24 G  Attempts:  3      Events: CSF confirmed, no blood with aspiration and no paresthesia        Assessment:  Insertion:  Uncomplicated  Patient tolerance:  Patient tolerated the procedure well with no immediate complications

## 2021-06-25 ENCOUNTER — TELEPHONE (OUTPATIENT)
Dept: FAMILY MEDICINE CLINIC | Age: 81
End: 2021-06-25

## 2021-06-25 VITALS
HEIGHT: 70 IN | DIASTOLIC BLOOD PRESSURE: 69 MMHG | TEMPERATURE: 98.6 F | OXYGEN SATURATION: 95 % | BODY MASS INDEX: 27.06 KG/M2 | HEART RATE: 90 BPM | SYSTOLIC BLOOD PRESSURE: 107 MMHG | WEIGHT: 189 LBS | RESPIRATION RATE: 14 BRPM

## 2021-06-25 DIAGNOSIS — E11.9 TYPE 2 DIABETES MELLITUS WITHOUT COMPLICATION, WITHOUT LONG-TERM CURRENT USE OF INSULIN (HCC): Primary | ICD-10-CM

## 2021-06-25 LAB
ANION GAP SERPL CALC-SCNC: 3 MMOL/L (ref 5–15)
BUN SERPL-MCNC: 19 MG/DL (ref 6–20)
BUN/CREAT SERPL: 24 (ref 12–20)
CALCIUM SERPL-MCNC: 7.9 MG/DL (ref 8.5–10.1)
CHLORIDE SERPL-SCNC: 113 MMOL/L (ref 97–108)
CO2 SERPL-SCNC: 25 MMOL/L (ref 21–32)
CREAT SERPL-MCNC: 0.79 MG/DL (ref 0.7–1.3)
GLUCOSE BLD STRIP.AUTO-MCNC: 152 MG/DL (ref 65–117)
GLUCOSE BLD STRIP.AUTO-MCNC: 187 MG/DL (ref 65–117)
GLUCOSE SERPL-MCNC: 141 MG/DL (ref 65–100)
HGB BLD-MCNC: 9 G/DL (ref 12.1–17)
INR PPP: 1.4 (ref 0.9–1.1)
POTASSIUM SERPL-SCNC: 4.7 MMOL/L (ref 3.5–5.1)
PROTHROMBIN TIME: 14 SEC (ref 9–11.1)
SERVICE CMNT-IMP: ABNORMAL
SERVICE CMNT-IMP: ABNORMAL
SODIUM SERPL-SCNC: 141 MMOL/L (ref 136–145)

## 2021-06-25 PROCEDURE — 74011250637 HC RX REV CODE- 250/637: Performed by: PHYSICIAN ASSISTANT

## 2021-06-25 PROCEDURE — 74011636637 HC RX REV CODE- 636/637: Performed by: PHYSICIAN ASSISTANT

## 2021-06-25 PROCEDURE — 85018 HEMOGLOBIN: CPT

## 2021-06-25 PROCEDURE — 74011250636 HC RX REV CODE- 250/636: Performed by: PHYSICIAN ASSISTANT

## 2021-06-25 PROCEDURE — 36415 COLL VENOUS BLD VENIPUNCTURE: CPT

## 2021-06-25 PROCEDURE — 97535 SELF CARE MNGMENT TRAINING: CPT

## 2021-06-25 PROCEDURE — 97530 THERAPEUTIC ACTIVITIES: CPT

## 2021-06-25 PROCEDURE — 74011000250 HC RX REV CODE- 250: Performed by: PHYSICIAN ASSISTANT

## 2021-06-25 PROCEDURE — 97116 GAIT TRAINING THERAPY: CPT

## 2021-06-25 PROCEDURE — 82962 GLUCOSE BLOOD TEST: CPT

## 2021-06-25 PROCEDURE — 97165 OT EVAL LOW COMPLEX 30 MIN: CPT

## 2021-06-25 PROCEDURE — 80048 BASIC METABOLIC PNL TOTAL CA: CPT

## 2021-06-25 PROCEDURE — 85610 PROTHROMBIN TIME: CPT

## 2021-06-25 RX ORDER — INSULIN PUMP SYRINGE, 3 ML
EACH MISCELLANEOUS
Qty: 1 KIT | Refills: 0 | Status: SHIPPED | OUTPATIENT
Start: 2021-06-25

## 2021-06-25 RX ORDER — IBUPROFEN 200 MG
CAPSULE ORAL
Qty: 180 STRIP | Refills: 0 | Status: SHIPPED | OUTPATIENT
Start: 2021-06-25 | End: 2022-07-13 | Stop reason: SDUPTHER

## 2021-06-25 RX ORDER — LANCETS
EACH MISCELLANEOUS
Qty: 1 EACH | Refills: 11 | Status: SHIPPED | OUTPATIENT
Start: 2021-06-25 | End: 2022-07-13 | Stop reason: SDUPTHER

## 2021-06-25 RX ORDER — HYDROCODONE BITARTRATE AND ACETAMINOPHEN 5; 325 MG/1; MG/1
1 TABLET ORAL
Qty: 42 TABLET | Refills: 0 | Status: SHIPPED | OUTPATIENT
Start: 2021-06-25 | End: 2021-07-02

## 2021-06-25 RX ADMIN — INSULIN LISPRO 2 UNITS: 100 INJECTION, SOLUTION INTRAVENOUS; SUBCUTANEOUS at 06:40

## 2021-06-25 RX ADMIN — CEFAZOLIN 2 G: 1 INJECTION, POWDER, FOR SOLUTION INTRAMUSCULAR; INTRAVENOUS at 00:27

## 2021-06-25 RX ADMIN — ACETAMINOPHEN 500 MG: 500 TABLET ORAL at 09:01

## 2021-06-25 RX ADMIN — WARFARIN SODIUM 3.5 MG: 2.5 TABLET ORAL at 11:55

## 2021-06-25 RX ADMIN — DOCUSATE SODIUM 50 MG AND SENNOSIDES 8.6 MG 1 TABLET: 8.6; 5 TABLET, FILM COATED ORAL at 09:00

## 2021-06-25 RX ADMIN — KETOROLAC TROMETHAMINE 15 MG: 30 INJECTION, SOLUTION INTRAMUSCULAR; INTRAVENOUS at 06:40

## 2021-06-25 RX ADMIN — ATORVASTATIN CALCIUM 10 MG: 10 TABLET, FILM COATED ORAL at 09:00

## 2021-06-25 RX ADMIN — PAROXETINE HYDROCHLORIDE 20 MG: 20 TABLET, FILM COATED ORAL at 09:00

## 2021-06-25 RX ADMIN — ALLOPURINOL 50 MG: 100 TABLET ORAL at 09:01

## 2021-06-25 RX ADMIN — ENOXAPARIN SODIUM 40 MG: 40 INJECTION SUBCUTANEOUS at 00:26

## 2021-06-25 RX ADMIN — ENOXAPARIN SODIUM 40 MG: 40 INJECTION SUBCUTANEOUS at 11:55

## 2021-06-25 RX ADMIN — POLYETHYLENE GLYCOL 3350 17 G: 17 POWDER, FOR SOLUTION ORAL at 09:00

## 2021-06-25 RX ADMIN — KETOROLAC TROMETHAMINE 15 MG: 30 INJECTION, SOLUTION INTRAMUSCULAR; INTRAVENOUS at 00:27

## 2021-06-25 RX ADMIN — ACETAMINOPHEN 500 MG: 500 TABLET ORAL at 06:40

## 2021-06-25 RX ADMIN — SOTALOL HYDROCHLORIDE 80 MG: 80 TABLET ORAL at 09:00

## 2021-06-25 RX ADMIN — INSULIN LISPRO 2 UNITS: 100 INJECTION, SOLUTION INTRAVENOUS; SUBCUTANEOUS at 11:55

## 2021-06-25 NOTE — PROGRESS NOTES
Physical Therapy Note 6/25/21    Pt seen and cleared for discharge from PT standpoint - nursing and case management notified.     Full note to follow -  Dominique Vang, PT

## 2021-06-25 NOTE — PROGRESS NOTES
SHA: The patient plans to discharge home with [facility] and wife to transport when stable for discharge. RUR: N/A    1. The patient is from home and lives with wife. 2. Orthopedics, PT/OT following. 3. The patient plans to discharge home with home health and wife to transport. Observation notice provided in writing to patient and/or caregiver as well as verbal explanation of the policy. Patients who are in outpatient status also receive the Observation notice. Patient has received notice and or patient representative has received via secure email, fax, or certified mail based on patient representative's preference. Reason for Admission:  Left Total Hip                     RUR Score:  N/A                 Plan for utilizing home health: The Plan for Transition of Care is related to the following treatment goals: Home Health    The Patient and/or patient representative Danilo Santos was provided with a choice of provider and agrees   with the discharge plan. [x] Yes [] No    Freedom of choice list was provided with basic dialogue that supports the patient's individualized plan of care/goals, treatment preferences and shares the quality data associated with the providers. [x] Yes [] No        PCP: First and Last name:  Denice Hunt MD, phone: [phone]     Name of Practice:    Are you a current patient: Yes/No: Yes   Approximate date of last visit: May, 2021   Can you participate in a virtual visit with your PCP: Yes                  Current Advanced Directive/Advance Care Plan: Full Code      Healthcare Decision Maker:   Click here to complete [address] including selection of the Healthcare Decision Maker Relationship (ie \"Primary\")                             Transition of Care Plan:       CM met with patient in room 558. The patient is alert and oriented x4. Confirmed demographics.  The patient is independent with ADL's/IADL's, owns his own walker, drives a vehicle and uses [address]. The patient plans to discharge home with wife, Yamila Palacios ([phone]) to transport when stable for discharge. CM following for discharge needs.     Rsoa Tipton RN/CRM

## 2021-06-25 NOTE — PROGRESS NOTES
Problem: Mobility Impaired (Adult and Pediatric)  Goal: *Acute Goals and Plan of Care (Insert Text)  Description: FUNCTIONAL STATUS PRIOR TO ADMISSION: Patient was independent without use of DME - limited by pain, + limp. HOME SUPPORT PRIOR TO ADMISSION: The patient lived with spouse but did not require assist.    Physical Therapy Goals  Initiated 6/24/2021    1. Patient will move from supine to sit and sit to supine  and scoot up and down in bed with modified independence within 4 days. 2. Patient will perform sit to stand with modified independence within 4 days. 3. Patient will ambulate with modified independence for > 150 feet with the least restrictive device within 4 days. 4. Patient will ascend/descend 4 stairs with one handrail(s) with modified independence within 4 days. 5. Patient will perform THR home exercise program per protocol with supervision/set-up within 4 days. Outcome: Progressing Towards Goal   PHYSICAL THERAPY TREATMENT  Patient: Madeline Vásquez (71 y.o. male)  Date: 6/25/2021  Diagnosis: Primary localized osteoarthritis of left hip [M16.12] <principal problem not specified>  Procedure(s) (LRB):  LEFT TOTAL HIP ARTHROPLASTY (SPINAL W/BLOCK W/IV SEDATION) (Left) 1 Day Post-Op  Precautions: WBAT  Chart, physical therapy assessment, plan of care and goals were reviewed. ASSESSMENT  Patient continues with skilled PT services and is progressing towards goals. Patient able to increase amb distance and perform stairs with standby guard only. Patient is cleared for discharge. Current Level of Function Impacting Discharge (mobility/balance): Standby assist to MOD indep functional mobility    Other factors to consider for discharge: daughter to assist as needed         PLAN :  Patient continues to benefit from skilled intervention to address the above impairments. Continue treatment per established plan of care. to address goals.     Recommendation for discharge: (in order for the patient to meet his/her long term goals)  Physical therapy at least 2 days/week in the home     This discharge recommendation:  Has been made in collaboration with the attending provider and/or case management    IF patient discharges home will need the following DME: patient owns DME required for discharge       SUBJECTIVE:   Patient stated i'm ready to go home.     OBJECTIVE DATA SUMMARY:   Critical Behavior:  Neurologic State: Alert  Orientation Level: Oriented X4  Cognition: Appropriate for age attention/concentration  Safety/Judgement: Awareness of environment  Functional Mobility Training:  Bed Mobility:     Supine to Sit: Modified independent  Sit to Supine: Modified independent  Scooting: Modified independent        Transfers:  Sit to Stand: Modified independent  Stand to Sit: Modified independent        Bed to Chair: Modified independent                    Balance:  Sitting: Intact; Without support  Standing: Intact; With support  Standing - Static: Good;Constant support  Standing - Dynamic : Good;Constant support  Ambulation/Gait Training:  Distance (ft): 200 Feet (ft)  Assistive Device: Walker, rolling;Gait belt  Ambulation - Level of Assistance: Modified independent        Gait Abnormalities: Antalgic;Decreased step clearance  Right Side Weight Bearing: Full  Left Side Weight Bearing: As tolerated  Base of Support: Center of gravity altered;Shift to right  Stance: Left decreased  Speed/Marcy: Slow  Step Length: Right shortened;Left shortened  Swing Pattern: Left asymmetrical        Stairs:  Number of Stairs Trained: 4  Stairs - Level of Assistance: Stand-by assistance   Rail Use: Right  (cane left ascending)    Pain Rating:  Left knee 1/10    Activity Tolerance:   Good    After treatment patient left in no apparent distress:   Supine in bed, Call bell within reach, and Caregiver / family present    COMMUNICATION/COLLABORATION:   The patients plan of care was discussed with: Registered nurse. Lesley Seat, PT   Time Calculation: 25 mins

## 2021-06-25 NOTE — PROGRESS NOTES
Informed SHASHANK Darden that pt hallucinates when takes oxycodone. Per SHASHANK Darden she will order another narcotic.

## 2021-06-25 NOTE — PROGRESS NOTES
The Joint Replacement Discharge Education video was reviewed by the patient/family. The content was discussed utilizing teach back, questions were answered. The patient verbalized an understanding of the instructions. LINK TO JOINT DISCHARGE EDUCATION VIDEO:  [id number]      I have reviewed discharge instructions with the patient. The patient and spouse verbalized understanding.

## 2021-06-25 NOTE — PROGRESS NOTES
Bedside and Verbal shift change report given to Arturo Bradford RN (oncoming nurse) by Teresa Logan RN (offgoing nurse). Report included the following information SBAR, Kardex, Procedure Summary, Intake/Output, MAR, Accordion and Recent Results.

## 2021-06-25 NOTE — PROGRESS NOTES
OCCUPATIONAL THERAPY EVALUATION/DISCHARGE  Patient: Bony Yeung (80 y.o. male)  Date: 6/25/2021  Primary Diagnosis: Primary localized osteoarthritis of left hip [M16.12]  Procedure(s) (LRB):  LEFT TOTAL HIP ARTHROPLASTY (SPINAL W/BLOCK W/IV SEDATION) (Left) 1 Day Post-Op   Precautions:  WBAT    ASSESSMENT  Based on the objective data described below, the patient presents with anticipated mild impairment with ADL tasks s/p L TOMY, POD 1. He lives with his wife and PTA was indep with ADL tasks and transfers. Provided education on RW use, home safety, hip precautions, and ADL compensatory techniques including dressing AD with fair understanding. His wife recently had TOMY with good recovery. She is able to assist with distal dressing tasks. Ambulated to the bathroom and back with SBA at RW level with occasional cues for proper RW use as he attempts to abandon RW. He denies any dizziness, lightheaded or nausea with sclfwaf2m. Recommend dc home with support from wife. Anticipate good progression with PT for higher level mobility and balance. Current Level of Function (ADLs/self-care): Ub care set up, Toileting SBA, grooming supervision at sink     Functional Outcome Measure: The patient scored 65/100 on the Barthel Index outcome measure. Other factors to consider for discharge: Patient plans to dc home with support from wife. He denies any concerns with ADL tasks. PLAN :  Recommendation for discharge: (in order for the patient to meet his/her long term goals)  No skilled occupational therapy/ follow up rehabilitation needs identified at this time. This discharge recommendation:  Has been made in collaboration with the attending provider and/or case management    IF patient discharges home will need the following DME: AE: long handled bathing and AE: long handled dressing; discussed where to purchase       SUBJECTIVE:   Patient stated I have been up to the bathroom earlier.     OBJECTIVE DATA SUMMARY:   HISTORY:   Past Medical History:   Diagnosis Date    Acoustic neuroma ([facility] [address] 02/18/2020    Basal cell adenocarcinoma     Chronic deep vein thrombosis (DVT) of lower extremity ([address] 2/14/2020    1.8 to 2.5 is goal    COVID-19 vaccine series completed     MODERNA 03/07/21 & 04/04/21    Hearing loss, right     Hypercholesteremia 2/14/2020    Lipitor 20mg    Melanoma in situ ([address]     TOP OF HEAD AND ON OUTSIDE OF LEFT KNEE    ISABELLA on CPAP 2/18/2020    Paroxysmal tachycardia, unspecified ([facility] [address] 2/14/2020    Sotalol Evaristo Pisano MD     Polym[facility] rheumatica [facility])     Psychiatric disorder     MILD ANXIETY     Skin cancer     SQUAMUS CELL      Past Surgical History:   Procedure Laterality Date    HX COLONOSCOPY      hx of GI bleed    HX ENDOSCOPY      HX GI      EXCISION IN COLONON     HX HERNIA REPAIR      X3    HX ORTHOPAEDIC Right     SHOULDER     HX TONSILLECTOMY      HX WISDOM TEETH EXTRACTION         Prior Level of Function/Environment/Context: Home with wife. Indep with ADL tasks. Writes for magazines   Expanded or extensive additional review of patient history:     Home Situation  Home Environment: Private residence  # Steps to Enter: 1  One/Two Story Residence: Two story, live on 1st floor  Lift Chair Available: Yes  Living Alone: No  Support Systems: Spouse/Significant Other/Partner  Patient Expects to be Discharged to[de-identified] [employer]  Current DME Used/Available at Home: Maria Luz Creed, straight, Walker, rolling, Safety frame [employer], [employer], Shower chair  Tub or Shower Type: Shower    Hand dominance: Right    EXAMINATION OF PERFORMANCE DEFICITS:  Cognitive/Behavioral Status:              Hearing:       Vision/Perceptual:                                Corrective Lenses: Glasses    Range of Motion:  AROM: Within functional limits                         Strength:  Strength:  Within functional limits                Coordination:  Coordination: Within functional limits  Fine Motor Skills-Upper: Left Intact; Right Intact    Gross Motor Skills-Upper: Left Intact; Right Intact    Tone & Sensation:  Tone: Normal  Sensation: Intact                      Balance:  Sitting: Intact; Without support  Standing: Intact; With support    Functional Mobility and Transfers for ADLs:  Bed Mobility:  Supine to Sit: Supervision  Scooting: Supervision    Transfers:  Sit to Stand: Stand-by assistance  Stand to Sit: Stand-by assistance  Bed to Chair: Stand-by assistance  Bathroom Mobility: Stand-by assistance  Toilet Transfer : Stand-by assistance (cues for proper RW use)    ADL Assessment:  The patient recalled and demonstrated hip contraindications Left LE during ADLs and functional mobility with min cues. Feeding: Independent  Oral Facial Hygiene/Grooming: Supervision  Bathing: Minimum assistance  Upper Body Dressing: Setup  Lower Body Dressing: Moderate assistance  Toileting: Supervision    ADL Intervention and task modifications:       Grooming  Washing Hands: Supervision    Lower Body Dressing Assistance  Underpants:  (ed on reacher)  Socks: Compensatory technique training (ed on sock aide)    Toileting  Bladder Hygiene: Modified independent  Clothing Management: Stand-by assistance         Bathing: Patient instructed when bathing to not submerge wound in water, stand to shower or sponge bathe, to return to showering. Patient indicated understanding. Dressing joint: Patient instructed to don/doff Left LE first/last min cues. Patient instructed and demonstrated to don all clothing while sitting prior to standing, doff all clothing to knees while standing, then sit to doff clothing off from knees to feet in order to facilitate fall prevention, pain management, and energy conservation. Home safety: Patient instructed on home modifications and safety (raise height of ADL objects, appropriate height of chair surfaces, recliner safety, change of floor surfaces, clear pathways) to increase independence and fall prevention. Patient indicated understanding. Standing: Patient instructed and demonstrated to walk up to sink/counter top/surfaces, step into walker to increase safety of joint and fall prevention with Supervision. Instructed to apply concept of hip contraindications to ADLs within the home (no extreme reaching across body to Right side, square off while using objects, slide objects along surfaces). Patient instructed to increase amount of time standing, observe standing position during ADLs in order to increase even weight bearing through bilateral LEs in order to increase independence with ADLs. Goal to be reached 30 days post - op, per orthopedic surgeon or per PT. Patient indicated understanding. Tub transfer: Patient instructed regarding when it is safe to begin transfer into tub (complete stairs with PT, advance exercises with PT high enough to clear tub height). Patient instructed to use the same technique as used with stairs when entering and exiting tub (\"up with the non-surgical, down with the surgical leg\"). Patient indicated understanding. Functional Measure:  Barthel Index:    Bathin  Bladder: 10  Bowels: 10  Groomin  Dressin  Feeding: 10  Mobility: 5  Stairs: 5  Toilet Use: 5  Transfer (Bed to Chair and Back): 10  Total: 65/100        The Barthel ADL Index: Guidelines  1. The index should be used as a record of what a patient does, not as a record of what a patient could do. 2. The main aim is to establish degree of independence from any help, physical or verbal, however minor and for whatever reason. 3. The need for supervision renders the patient not independent. 4. A patient's performance should be established using the best available evidence. Asking the patient, friends/relatives and nurses are the usual sources, but direct observation and common sense are also important. However direct testing is not needed.   5. Usually the patient's performance over the preceding 24-48 hours is important, but occasionally longer periods will be relevant. 6. Middle categories imply that the patient supplies over 50 per cent of the effort. 7. Use of aids to be independent is allowed. Stephen Herr., Barthel, D.WNishi (4277). Functional evaluation: the Barthel Index. [address] [facility] (14)2. JOSÉ MIGUEL Ortiz, Krista Cordero., Fritz Knott, Ramandeep, [facility] (1999). Measuring the change indisability after inpatient rehabilitation; comparison of the responsiveness of the Barthel Index and Functional [place] Measure. Journal of Neurology, Neurosurgery, and Psychiatry, 66(4), 441-852. Janice Herron, N.J.A, NILA Curtis, & Sharonda Rey M.A. (2004.) Assessment of post-stroke quality of life in cost-effectiveness studies: The usefulness of the Barthel Index and the EuroQoL-5D. Quality of Life Research, 15, 230-98         Occupational Therapy Evaluation Charge Determination   History Examination Decision-Making   LOW Complexity : Brief history review  LOW Complexity : 1-3 performance deficits relating to physical, cognitive , or psychosocial skils that result in activity limitations and / or participation restrictions  LOW Complexity : No comorbidities that affect functional and no verbal or physical assistance needed to complete eval tasks       Based on the above components, the patient evaluation is determined to be of the following complexity level: LOW   Pain Rating:  Reports mild surgery pain    Activity Tolerance:   Fair      After treatment patient left in no apparent distress:    Sitting in chair and Call bell within reach    COMMUNICATION/EDUCATION:   The patients plan of care was discussed with: Physical therapist and Registered nurse.      Thank you for this referral.  Delilah Lopez OT  Time Calculation: 28 mins

## 2021-06-25 NOTE — PROGRESS NOTES
Orthopaedics Daily Progress Note                            Date of Surgery:  6/24/2021      Patient: Nallely Rashid   YOB: 1940  Age: 68 y.o. SUBJECTIVE:   1 Day Post-Op following LEFT TOTAL HIP ARTHROPLASTY (SPINAL W/BLOCK W/IV SEDATION). The patient's post operative pain is controlled. No CP/SOB. No N/V. The patient's mobility will be evaluated today during PT sessions. OBJECTIVE:     Vital Signs:      Visit Vitals  /69 (BP 1 Location: Left upper arm, BP Patient Position: Sitting)   Pulse 90   Temp 98.6 °F (37 °C)   Resp 14   Ht 5' 10\" (1.778 m)   Wt 85.7 kg (189 lb)   SpO2 95%   BMI 27.12 kg/m²       Physical Exam:  General: A&Ox3. The patient is cooperative, and in no acute distress. Respiratory: Respirations are unlabored. Surgical site(s): dressing clean, dry  Musculoskeletal: Calves are soft, supple, and non-tender upon palpation. Motor 5/5. Neurological:  Neurovascularly intact with good dorsi and plantar flexion.     Pulses symmetrical.    Laboratory Values:             Recent Results (from the past 12 hour(s))   METABOLIC PANEL, BASIC    Collection Time: 06/25/21  3:08 AM   Result Value Ref Range    Sodium 141 136 - 145 mmol/L    Potassium 4.7 3.5 - 5.1 mmol/L    Chloride 113 (H) 97 - 108 mmol/L    CO2 25 21 - 32 mmol/L    Anion gap 3 (L) 5 - 15 mmol/L    Glucose 141 (H) 65 - 100 mg/dL    BUN 19 6 - 20 MG/DL    Creatinine 0.79 0.70 - 1.30 MG/DL    BUN/Creatinine ratio 24 (H) 12 - 20      GFR est AA >60 >60 ml/min/1.73m2    GFR est non-AA >60 >60 ml/min/1.73m2    Calcium 7.9 (L) 8.5 - 10.1 MG/DL   HEMOGLOBIN    Collection Time: 06/25/21  3:08 AM   Result Value Ref Range    HGB 9.0 (L) 12.1 - 17.0 g/dL   PROTHROMBIN TIME + INR    Collection Time: 06/25/21  3:08 AM   Result Value Ref Range    INR 1.4 (H) 0.9 - 1.1      Prothrombin time 14.0 (H) 9.0 - 11.1 sec   GLUCOSE, POC    Collection Time: 06/25/21  6:19 AM   Result Value Ref Range    Glucose (POC) 152 (H) 65 - 117 mg/dL    Performed by Kassie Zavala          PLAN:     S/P LEFT TOTAL HIP ARTHROPLASTY (SPINAL W/BLOCK W/IV SEDATION) -Continue WBAT. -Mobilize and continue with PT/OT until discharged     Hemodynamics Hgb today is 9.0. Acute blood loss anemia as expected. Patient asymptomatic. Continue to monitor. Wound Monitor postop dressing; no postop dressing changes necessary. Reinforce PRN. Post Operative Pain Pain Control: stable, mild-to-moderate joint symptoms intermittently, reasonably well controlled by current meds. DVT Prophylaxis Continue with SCD'S, Ankle Pump Exercises. Lovenox to coumadin bridge     Discharge Disposition Discharge plan: Home with HHPT pending PT clearance.        Signed By: Yadiel Dumas PA-C  June 25, 2021 10:00 AM

## 2021-06-25 NOTE — PROGRESS NOTES
Problem: Diabetes Self-Management  Goal: *Disease process and treatment process  Description: Define diabetes and identify own type of diabetes; list 3 options for treating diabetes. Outcome: Resolved/Met  Goal: *Incorporating nutritional management into lifestyle  Description: Describe effect of type, amount and timing of food on blood glucose; list 3 methods for planning meals. Outcome: Resolved/Met  Goal: *Incorporating physical activity into lifestyle  Description: State effect of exercise on blood glucose levels. Outcome: Resolved/Met  Goal: *Developing strategies to promote health/change behavior  Description: Define the ABC's of diabetes; identify appropriate screenings, schedule and personal plan for screenings. Outcome: Resolved/Met  Goal: *Using medications safely  Description: State effect of diabetes medications on diabetes; name diabetes medication taking, action and side effects. Outcome: Resolved/Met  Goal: *Monitoring blood glucose, interpreting and using results  Description: Identify recommended blood glucose targets  and personal targets. Outcome: Resolved/Met  Goal: *Prevention, detection, treatment of acute complications  Description: List symptoms of hyper- and hypoglycemia; describe how to treat low blood sugar and actions for lowering  high blood glucose level. Outcome: Resolved/Met  Goal: *Prevention, detection and treatment of chronic complications  Description: Define the natural course of diabetes and describe the relationship of blood glucose levels to long term complications of diabetes.   Outcome: Resolved/Met  Goal: *Developing strategies to address psychosocial issues  Description: Describe feelings about living with diabetes; identify support needed and support network  Outcome: Resolved/Met  Goal: *Insulin pump training  Outcome: Resolved/Met  Goal: *Sick day guidelines  Outcome: Resolved/Met  Goal: *Patient Specific Goal (EDIT GOAL, INSERT TEXT)  Outcome: Resolved/Met Problem: Patient Education: Go to Patient Education Activity  Goal: Patient/Family Education  Outcome: Resolved/Met     Problem: Falls - Risk of  Goal: *Absence of Falls  Description: Document Jamil Santamaria Fall Risk and appropriate interventions in the flowsheet.   Outcome: Resolved/Met     Problem: Patient Education: Go to Patient Education Activity  Goal: Patient/Family Education  Outcome: Resolved/Met     Problem: Patient Education: Go to Patient Education Activity  Goal: Patient/Family Education  Outcome: Resolved/Met     Problem: Discharge Planning  Goal: *Discharge to safe environment  Outcome: Resolved/Met

## 2021-06-25 NOTE — PROGRESS NOTES
Pharmacist Note - Warfarin Dosing  Consult provided for this 80 y.o.male to manage warfarin for chronic DVT    INR Goal: 2 - 3 (per outpt anticoag visit notes)    Home regimen/ tablet size: 3.5 mg daily EXCEPT 2.5 mg on MWF    Drugs that may increase INR: None  Drugs that may decrease INR: None  Other current anticoagulants/ drugs that may increase bleeding risk: Lovenox (bridge), allopurinol  Risk factors: Age > 65  Daily INR ordered: NO- ordered daily per protocol    Recent Labs     06/25/21  0308 06/24/21  0728   HGB 9.0*  --    INR 1.4* 1.5*     Date               INR                  Dose  6/24              1.5              3.5 mg  6/25    1.4      3.5 mg                                                                               Assessment/ Plan: Will order warfarin 3.5 mg PO x 1 dose. Pharmacy will continue to monitor daily and adjust therapy as indicated. Please contact the pharmacist at x [phone] or  for outpatient recommendations if needed.

## 2021-06-25 NOTE — OP NOTES
[address]   OPERATIVE REPORT    Name:  Jennelle Habermann  MR#:  [id number]  :  1940  ACCOUNT #:  [de-identified]  DATE OF SERVICE:  2021    PREOPERATIVE DIAGNOSIS:  Osteoarthritis, left hip. POSTOPERATIVE DIAGNOSIS:  Osteoarthritis, left hip. PROCEDURE PERFORMED:  Left total hip arthroplasty. SURGEON:  Edmund Valentine MD    FIRST ASSISTANT:  Martha Burnham PA-C    ANESTHESIA:  Spinal with sedation. COMPLICATIONS:  None. SPECIMENS REMOVED:  None. IMPLANTS:  Components implanted, DonJoy 56-mm Empowr acetabular shell with 40-mm inside diameter neutral polyethylene liner and 1 cancellous screw, size 14 standard offset TaperFill femoral stem with 40 mm +4 ceramic femoral head. ESTIMATED BLOOD LOSS:  300 mL. INDICATIONS:  The patient is a 72-year-old gentleman with progressive left hip and groin pain due to severe osteoarthritis. Symptoms have progressed despite comprehensive conservative treatment. He presents for left total hip replacement. Risks, benefits, alternatives of procedure reviewed with him in detail and he desires to proceed. PROCEDURE:  The patient was taken to the operating room where anesthesia team placed a spinal.  Preoperative IV antibiotics were administered. He was turned to right lateral decubitus position on a [facility] frame hip positioner. All bony prominences were well-padded and an axillary roll was placed. Left hip and thigh were prepped and draped in usual sterile fashion. Through a lateral hip incision, I performed a posterior approach to the left hip. Short rotators and posterior capsule were reflected posteriorly. Leg lengths were checked on the table for comparison with trial reduction later during the procedure. Hip was dislocated and femoral neck osteotomy was made. Acetabular retractors were placed and labrum was excised.   Acetabulum was reamed with hemispherical reamers up to 55-mm before impacting a 56-mm Empowr acetabular shell. Intrinsic stability was satisfactory, but was augmented with 1 cancellous screw. 40-mm trial liner was placed. Femur was prepared with TaperFill broaches up to size 14 before achieving rotational stability. Calcar was planed. Based on native anatomy, hip was reduced with a standard offset neck trial, 40-mm +4 head trial produced satisfactory leg length and soft tissue tension. Hip was stable to full extension, external rotation with no internal impingement, stable to straight hyperflexion and with the hip flexed 90 degrees in neutral abduction, there was some external impingement anteriorly and superiorly at about 50 degrees of internal rotation, removal of the anterior superior capsule improved rotation up to about 70-80 degrees. Trials were removed. The wound was copiously irrigated by pulse lavage before the real components were implanted. Same leg length and stability were achieved. Periarticular soft tissues were injected with solution containing 0.5% ropivacaine with epinephrine as well as clonidine and Toradol. Posterior capsule was repaired to the inner aspect of posterior greater trochanter through drill holes using #2 Ethibond sutures. Deep fascia was closed with a combination of heavy Vicryl sutures and a running #2 Stratafix suture. Skin and subcutaneous layers were closed in layered fashion with Vicryl and a running Monocryl subcuticular stitch. Wound was dressed with Dermabond and Aquacel occlusive dressing. The patient's bladder was decompressed with straight catheterization before he was transported to the postanesthesia care unit in stable condition. All counts were correct at the end of the procedure. The physician assistant was critical throughout the case to assist with positioning, retraction, and closure. There were no other available residents, fellows, or surgical assistants available to assist during this procedure.       Daya Rojo MD [email or web address]  D:  06/24/2021 18:39  T:  06/24/2021 20:14  JOB #:  [id number]  CC:  MD Honey Lara MD

## 2021-06-25 NOTE — TELEPHONE ENCOUNTER
Called patient to follow up on recent surgery. He was noted to have elevated BG in hospital and recent A1c of 7. 1. Will provide BG monitor for him to monitor his BG at home. Once postoperative period is stable, will discuss starting metformin. He also reports having elevated temp of 100.8. He denies any cough, abd pain, dyspnea, or other symptoms. He will keep and close eye on his temp and symptoms. If anything changes, he agrees to go to the ER for further evaluatoin.        Gideon Colon MD

## 2021-06-27 ENCOUNTER — TELEPHONE (OUTPATIENT)
Dept: FAMILY MEDICINE CLINIC | Age: 81
End: 2021-06-27

## 2021-06-27 DIAGNOSIS — E11.9 TYPE 2 DIABETES MELLITUS WITHOUT COMPLICATION, WITHOUT LONG-TERM CURRENT USE OF INSULIN (HCC): Primary | ICD-10-CM

## 2021-06-27 RX ORDER — METFORMIN HYDROCHLORIDE 500 MG/1
500 TABLET, EXTENDED RELEASE ORAL
Qty: 90 TABLET | Refills: 1 | Status: SHIPPED | OUTPATIENT
Start: 2021-06-27 | End: 2021-08-05 | Stop reason: SDUPTHER

## 2021-06-28 ENCOUNTER — TELEPHONE (OUTPATIENT)
Dept: FAMILY MEDICINE CLINIC | Age: 81
End: 2021-06-28

## 2021-06-28 NOTE — TELEPHONE ENCOUNTER
----- Message from Laura Brewster sent at 6/28/2021  9:58 AM EDT -----  Regarding: Dr. Khurram Mittal Message/Vendor Calls    Caller's first and last name: Negro Rm at [address]      Reason for call: update      Callback required yes/no and why:yes, please      Best contact number(s): [phone]      Details to clarify the request: Level 2 medication interaction on Warfarin with Paxil and Warfarin with Allopurinol.        Laura Brewster

## 2021-06-28 NOTE — NURSE NAVIGATOR
[address]  [facility]opa[facility]way Handoff     FROM:                                TO: At [address]                                                      ([address] [place] [address] or Facility name)  Daphnie Espinoza [address]  [address] [place] [address]  Dept: [address]: [phone]                                      Room#:  558/01                                                       Nurse Navigator:  Job Fulton RN         SITUATION      ASAScore: ASA 3 - Patient with moderate systemic disease with functional limitations    Admitted:  6/24/2021  Hospital Day: 2      Attending Provider:  No att. providers found     Consultations:  None    PCP:  Ilene Holt MD   [phone]     Admitting Dx:  Primary localized osteoarthritis of left hip [M16.12]       Active Problems:    Primary localized osteoarthritis of left hip (6/24/2021)      4 Days Post-Op of   Procedure(s):  LEFT TOTAL HIP ARTHROPLASTY (SPINAL W/BLOCK W/IV SEDATION)   BY: Parminder Agrawal MD             ON: 6/24/2021                  Code Status: Prior             Advance Directive? Not Received (Send w/patient)     Isolation:  There are currently no Active Isolations       MDRO: No current active infections    BACKGROUND     Allergies:   Allergies   Allergen Reactions    Sulfa (Sulfonamide Antibiotics) Rash       Past Medical History:   Diagnosis Date    Acoustic neuroma ([address] 02/18/2020    Basal cell adenocarcinoma     Chronic deep vein thrombosis (DVT) of lower extremity ([address] 2/14/2020    1.8 to 2.5 is goal    COVID-19 vaccine series completed     MODERNA 03/07/21 & 04/04/21    Hearing loss, right     Hypercholesteremia 2/14/2020    Lipitor 20mg    Melanoma in situ ([address]     TOP OF HEAD AND ON OUTSIDE OF LEFT KNEE    ISABELLA on CPAP 2/18/2020    Paroxysmal tachycardia, unspecified ([address] 2/14/2020    Sotalol Saw Lucas Tovar MD     Polymyalgia rheumatica [facility])     Psychiatric disorder MILD ANXIETY     Skin cancer     SQUAMUS CELL        Past Surgical History:   Procedure Laterality Date    HX COLONOSCOPY      hx of GI bleed    HX ENDOSCOPY      HX GI      EXCISION IN COLONON     HX HERNIA REPAIR      X3    HX ORTHOPAEDIC Right     SHOULDER     HX TONSILLECTOMY      HX WISDOM TEETH EXTRACTION         Prior to Admission Medications   Prescriptions Last Dose Informant Patient Reported? Taking? Comp. Stocking,Knee,Regular,Lrg (Relief Knee Open Toe Stocking) misc   No No   Sig: Use daily for lower extremity swelling, Size large 30-40mm, Model # [id number]   Lactobacillus acidophilus (PROBIOTIC PO) 2021  Yes No   Sig: Take 15 billion cell by mouth daily. OTHER,NON-FORMULARY, 2021  Yes No   Sig: Take 10 mg by mouth daily. HEMP EXTRACT GUMMIES   PARoxetine (PaxiL) 20 mg tablet 2021 at Unknown time  No Yes   Sig: Take 1 Tab by mouth two (2) times a day. allopurinoL (ZYLOPRIM) 100 mg tablet 2021 at Unknown time  No Yes   Sig: Take 0.5 Tabs by mouth daily. ascorbic acid, vitamin C, (VITAMIN C) 500 mg tablet 2021  Yes No   Sig: Take  by mouth. atorvastatin (LIPITOR) 10 mg tablet 2021 at Unknown time  No Yes   Sig: Take 1 Tablet by mouth daily. b complex-folic acid 0.4 mg tablet 2021  Yes No   Sig: Take 1 Tab by mouth daily. calcium carbonate (CALCIUM 500 PO) 2021  Yes No   Sig: Take  by mouth. cholecalciferol (VITAMIN D3) (2,000 UNITS /50 MCG) cap capsule 2021  Yes No   Sig: Take 2,000 Units by mouth daily. digestive enzymes cap 2021  Yes No   Sig: Take 220 mg by mouth daily. enoxaparin (LOVENOX) 40 mg/0.4 mL 2021 at Unknown time  No No   Si.4 mL by SubCUTAneous route every twelve (12) hours every twelve (12) hours for 4 doses. folic acid (FOLVITE) 1 mg tablet 2021  Yes No   Sig: Take  by mouth daily.    gluc case/chondro case A/vit C/Mn (GLUCOSAMINE 1500 COMPLEX PO) 2021  Yes No   Sig: Take  by mouth.   iron 18 mg tab 2021  Yes No   Sig: Take 1 mg by mouth.   magnesium 250 mg tab 2021  Yes No   Sig: Take  by mouth.   sotaloL (BETAPACE) 80 mg tablet 2021 at Unknown time  No Yes   Sig: Take 1 Tab by mouth two (2) times a day. traMADoL (ULTRAM) 50 mg tablet Unknown at Unknown time  Yes No   Sig: Take 50 mg by mouth daily. turmeric (CURCUMIN) 2021  Yes No   Si Tablet by Does Not Apply route daily. warfarin (COUMADIN) 1 mg tablet 2021  No No   Sig: 3.5 mg daily except M/W/F take 2.5 mg   warfarin (Coumadin) 2.5 mg tablet   No No   Sig: 3.5 mg daily except M/W/F take 2.5 mg      Facility-Administered Medications: None       Vaccinations:    Immunization History   Administered Date(s) Administered    Covid-19, MODERNA, Mrna, Lnp-s, Pf, 100mcg/0.5mL 2021, 2021    Influenza High Dose Vaccine PF 2019    Pneumococcal Polysaccharide (PPSV-23) 2021    Zoster Recombinant 2019         ASSESSMENT   Age: 68 y.o. Gender: male        Height: Height: 5' 10\" (177.8 cm)                    Weight:Weight: 85.7 kg (189 lb)     No data found. Active Orders   There are no active orders of the following types: Diet. Orientation: Orientation Level: Oriented X4    Active Lines/Drains:  (Peg Tube / Gruber / CL or S/L?):no    Urinary Status: Voiding      Last BM:       Skin Integrity: Incision (comment) (left hip)             Mobility: Slightly limited   Weight Bearing Status: WBAT (Weight Bearing as Tolerated)      Gait Training  Assistive Device: Walker, rolling, Gait belt  Ambulation - Level of Assistance: Modified independent  Distance (ft): 200 Feet (ft)  Stairs - Level of Assistance: Stand-by assistance  Number of Stairs Trained: 4  Rail Use: Right  (cane left ascending)     On Anticoagulation?  YES  Coumadin                                         Pain Medications given:  Norco                                   Lab Results   Component Value Date/Time    Glucose 141 (H) 06/25/2021 03:08 AM    Hemoglobin A1c 7.1 (H) 06/15/2021 12:08 PM    INR 1.4 (H) 06/25/2021 03:08 AM    INR 2.0 (H) 06/15/2021 12:08 PM    INR (POC) 1.5 (H) 06/24/2021 07:28 AM    HGB 9.0 (L) 06/25/2021 03:08 AM    HGB 11.6 (L) 06/15/2021 12:08 PM    HGB 12.7 04/28/2021 11:10 AM    HGB 10.9 (L) 12/08/2020 08:54 AM       Readmission Risks:  Score:         RECOMMENDATION     See After Visit Summary (AVS) for:  · Discharge instructions  · After [address]   · Medication Reconciliation          [facility] Orthopaedic Nurse Navigator  HARSHIL Reeves, RN-BC       Office  [phone]  Cell      [phone]  Fax      [phone]  [email or web address]             . Farooq

## 2021-06-28 NOTE — TELEPHONE ENCOUNTER
Please let home health agency know that the effect of these interactions has to do with coumadin metabolism and INR. The patient's INR is being closely monitored by cardiology.

## 2021-06-28 NOTE — TELEPHONE ENCOUNTER
Returned call to Wink patients name and  verified. Advised that providers are aware of interaction and INR is being closely monitored by cardiology.  She was appreciative of call

## 2021-06-29 ENCOUNTER — PATIENT OUTREACH (OUTPATIENT)
Dept: CASE MANAGEMENT | Age: 81
End: 2021-06-29

## 2021-06-29 NOTE — PROGRESS NOTES
Post Discharge Follow-up contact after Joint Replacement    Patient discharged on 6/25/21  By  Hayley Gonzalez   following  left hip Arthroplasty. Spoke with patient today, who reports they are \" working with PT and doing well. \"  Denies Fever, Shortness of Breath or Chest Pain. Home Health has visited. Patient also reports:  Aquacel dressing clean, dry, intact  Calf is non-tender, operative extremity has minimal swelling. Pain is well managed. Discussed use of ice & elevation. Patient is progressing with therapy and is exercising independently. Taking Coumadin for anticoagulation, Tylenol for pain ( has stopped using Norco). Patient is not experiencing symptoms of constipation & urinating without difficulty. Discussed side effects of anticoagulants & pain medications (bleeding/bruising, constipation, lightheaded/dizziness)  Follow up appointment is scheduled. Discussed calling surgeon DR. Puri  for drainage, bleeding, swelling in operative extremity, fever or pain. Discussed calling PCP Dr. Katz with other medical issues.

## 2021-06-30 NOTE — TELEPHONE ENCOUNTER
The patient has had surgery and appears to be doing well. Thank you for following up.      Jayleen Garner MD

## 2021-07-08 ENCOUNTER — TRANSCRIBE ORDER (OUTPATIENT)
Dept: SCHEDULING | Age: 81
End: 2021-07-08

## 2021-07-08 DIAGNOSIS — R60.9 SWELLING: ICD-10-CM

## 2021-07-08 DIAGNOSIS — M79.669 CALF PAIN: Primary | ICD-10-CM

## 2021-07-09 ENCOUNTER — HOSPITAL ENCOUNTER (OUTPATIENT)
Dept: ULTRASOUND IMAGING | Age: 81
Discharge: HOME | End: 2021-07-09
Attending: ORTHOPAEDIC SURGERY
Payer: MEDICARE

## 2021-07-09 DIAGNOSIS — R60.9 SWELLING: ICD-10-CM

## 2021-07-09 DIAGNOSIS — M79.669 CALF PAIN: ICD-10-CM

## 2021-07-09 PROCEDURE — 93970 EXTREMITY STUDY: CPT

## 2021-07-12 DIAGNOSIS — F41.9 ANXIETY: ICD-10-CM

## 2021-07-12 RX ORDER — PAROXETINE HYDROCHLORIDE 20 MG/1
20 TABLET, FILM COATED ORAL 2 TIMES DAILY
Qty: 180 TABLET | Refills: 1 | Status: SHIPPED | OUTPATIENT
Start: 2021-07-12 | End: 2022-01-18 | Stop reason: SDUPTHER

## 2021-07-12 NOTE — TELEPHONE ENCOUNTER
Last visit 04/28/2021 MD Rose August   Next appointment 08/09/2021 MD Rose August   Previous refill encounter(s)   02/11/2021 Paxil #180 with 1 refill     Requested Prescriptions     Pending Prescriptions Disp Refills    PARoxetine (PaxiL) 20 mg tablet 180 Tablet 1     Sig: Take 1 Tablet by mouth two (2) times a day.

## 2021-07-22 ENCOUNTER — TELEPHONE (OUTPATIENT)
Dept: FAMILY MEDICINE CLINIC | Age: 81
End: 2021-07-22

## 2021-07-22 NOTE — TELEPHONE ENCOUNTER
07/22/21  5:38 PM    Called patient and discussed subtherapeutic INR of 1.5 (down from 1.8 on Monday).  Advised patient to take 6mg tonight and increase weekly dosing from:   - 2.5mg on M,W,F and 3.5mg all other days   to   -2.5mg on M,W and 3.5mg all other days    Patient agrees to schedule INR check with his cardiology office next week      Gifty Dunbar MD

## 2021-07-22 NOTE — TELEPHONE ENCOUNTER
Spoke to Akash,  The pt has been d/c for [facility]. She couldn't confirm his coumadin dose. I called pt and he states that he is taking 2.5 mg on M,W,F and 3.5 mg all other days. He has 1 mg and 2.5 mg tabs. Advised that I will check with Dr Amy Arguello and get back with him.

## 2021-07-22 NOTE — TELEPHONE ENCOUNTER
[facility] called and said Patient's PT/INR was 1.5 18.0 seconds. Pt is also being d/c today.     [facility]# [phone]

## 2021-08-09 ENCOUNTER — VIRTUAL VISIT (OUTPATIENT)
Dept: FAMILY MEDICINE CLINIC | Age: 81
End: 2021-08-09
Payer: MEDICARE

## 2021-08-09 DIAGNOSIS — Z99.89 OSA ON CPAP: ICD-10-CM

## 2021-08-09 DIAGNOSIS — G47.33 OSA ON CPAP: ICD-10-CM

## 2021-08-09 DIAGNOSIS — R49.9 CHANGE IN VOICE: Primary | ICD-10-CM

## 2021-08-09 DIAGNOSIS — E78.00 HYPERCHOLESTEREMIA: ICD-10-CM

## 2021-08-09 DIAGNOSIS — I82.5Z9 CHRONIC DEEP VEIN THROMBOSIS (DVT) OF DISTAL VEIN OF LOWER EXTREMITY, UNSPECIFIED LATERALITY (HCC): ICD-10-CM

## 2021-08-09 DIAGNOSIS — Z79.01 LONG TERM (CURRENT) USE OF ANTICOAGULANTS: ICD-10-CM

## 2021-08-09 DIAGNOSIS — Z96.642 STATUS POST LEFT HIP REPLACEMENT: ICD-10-CM

## 2021-08-09 DIAGNOSIS — Z79.01 ANTICOAGULATED ON COUMADIN: ICD-10-CM

## 2021-08-09 DIAGNOSIS — D33.3 ACOUSTIC NEUROMA (HCC): ICD-10-CM

## 2021-08-09 DIAGNOSIS — E11.9 TYPE 2 DIABETES MELLITUS WITHOUT COMPLICATION, WITHOUT LONG-TERM CURRENT USE OF INSULIN (HCC): ICD-10-CM

## 2021-08-09 DIAGNOSIS — I48.0 PAROXYSMAL ATRIAL FIBRILLATION (HCC): ICD-10-CM

## 2021-08-09 PROCEDURE — 99213 OFFICE O/P EST LOW 20 MIN: CPT | Performed by: FAMILY MEDICINE

## 2021-08-09 PROCEDURE — G8419 CALC BMI OUT NRM PARAM NOF/U: HCPCS | Performed by: FAMILY MEDICINE

## 2021-08-09 PROCEDURE — G8432 DEP SCR NOT DOC, RNG: HCPCS | Performed by: FAMILY MEDICINE

## 2021-08-09 PROCEDURE — 1101F PT FALLS ASSESS-DOCD LE1/YR: CPT | Performed by: FAMILY MEDICINE

## 2021-08-09 PROCEDURE — G8427 DOCREV CUR MEDS BY ELIG CLIN: HCPCS | Performed by: FAMILY MEDICINE

## 2021-08-09 PROCEDURE — 3051F HG A1C>EQUAL 7.0%<8.0%: CPT | Performed by: FAMILY MEDICINE

## 2021-08-09 PROCEDURE — G8536 NO DOC ELDER MAL SCRN: HCPCS | Performed by: FAMILY MEDICINE

## 2021-08-09 PROCEDURE — G0463 HOSPITAL OUTPT CLINIC VISIT: HCPCS | Performed by: FAMILY MEDICINE

## 2021-08-09 NOTE — PROGRESS NOTES
Nathan Davis  68 y.o. male  1940  [facility] [address]  [id number]     [address] [facility]       Encounter Date: 8/9/2021           Established Patient Visit Note: Flaco Neely MD    Reason for Appointment:  Chief Complaint   Patient presents with    Follow-up       History of Present Illness:  History provided by patient    Nathan Davis is a 68 y.o. male who presents today for:      · Parox Afib/aflutter: doing well on current regimen; he states that his last INR was two weeks ago with home health. He is not sure of the number. On last discussion of 7/22/21, he was suppose to follow up with cardiology for his INR check, but he has not yet followed up. He is taking 2.5 mg two days per week and 3.5mg all other days. He states that his next INR check is with his cardiology office this week. · Voice Change: he reports that he has been noticing it for about one year, but he has not brought it up until now. · Chronic DVT: taking coumadin as above; recent LE duplex with ortho showed chronic non-occlusive thrombus in the multiple veins  · DM2: average  and average PPBG average 152; taking metformin 500mg tid. He is working to eat healthy and he is monitoring his carb intake. Last A1c 7.1 on 06/15/21. · S/p Left Hip Arthroplasty: he reports that he is recovering well  · ISABELLA on CPAP: he reports that this has been doing well  · Dyslipidemia: tolerating atorvastatin; last LDL 64.8 in Nov, 2020  · Acoustic Neuroma: he reports that he has visit with ENT next week        Review of Systems  Review of Systems   Constitutional: Negative for chills and fever. Respiratory: Negative for cough, shortness of breath and wheezing. Cardiovascular: Negative for chest pain and palpitations.           Allergies: Sulfa (sulfonamide antibiotics)    Medications: (Updated to reflect final medication list after visit)    Current Outpatient Medications:     metFORMIN ER (GLUCOPHAGE XR) 500 mg tablet, Take 1 Tablet by mouth three (3) times daily (with meals). , Disp: 270 Tablet, Rfl: 1    PARoxetine (PaxiL) 20 mg tablet, Take 1 Tablet by mouth two (2) times a day., Disp: 180 Tablet, Rfl: 1    glucose blood VI test strips (blood glucose test) strip, Use to check fasting and evening blood sugar daily. Patient may use whichever product is covered by insurance., Disp: 180 Strip, Rfl: 0    lancets misc, Use to check fasting and evening blood sugar daily. Patient may use whichever product is covered by insurance., Disp: 1 Each, Rfl: 11    Blood-Glucose Meter monitoring kit, Use to check fasting and evening blood sugar daily. Patient may use whichever product is covered by insurance., Disp: 1 Kit, Rfl: 0    senna-docusate (PERICOLACE) 8.6-50 mg per tablet, Take 1 Tablet by mouth two (2) times daily as needed for Constipation. , Disp: 60 Tablet, Rfl: 0    acetaminophen (TYLENOL) 500 mg tablet, Take 1 Tablet by mouth every four (4) hours. , Disp: 100 Tablet, Rfl: 0    iron 18 mg tab, Take 1 mg by mouth., Disp: , Rfl:     Lactobacillus acidophilus (PROBIOTIC PO), Take 15 billion cell by mouth daily. , Disp: , Rfl:     digestive enzymes cap, Take 220 mg by mouth daily. , Disp: , Rfl:     OTHER,NON-FORMULARY,, Take 10 mg by mouth daily. HEMP EXTRACT GUMMIES, Disp: , Rfl:     atorvastatin (LIPITOR) 10 mg tablet, Take 1 Tablet by mouth daily. , Disp: 90 Tablet, Rfl: 1    warfarin (COUMADIN) 1 mg tablet, 3.5 mg daily except M/W/F take 2.5 mg (Patient taking differently: Taking 3.5 mg ( 1 x 2.5 mg tablet + 1 x 1 mg tablet) daily except on Mondays/Wednesdays, take 2.5 mg (1 x 2.5 mg tablet)), Disp: 48 Tab, Rfl: 1    warfarin (Coumadin) 2.5 mg tablet, 3.5 mg daily except M/W/F take 2.5 mg (Patient taking differently: Taking 3.5 mg ( 1 x 2.5 mg tablet + 1 x 1 mg tablet) daily except on Mondays/Wednesdays, take 2.5 mg (1 x 2.5 mg tablet)), Disp: 90 Tab, Rfl: 1    allopurinoL (ZYLOPRIM) 100 mg tablet, Take 0.5 Tabs by mouth daily. , Disp: 45 Tab, Rfl: 1    sotaloL (BETAPACE) 80 mg tablet, Take 1 Tab by mouth two (2) times a day., Disp: 180 Tab, Rfl: 1    b complex-folic acid 0.4 mg tablet, Take 1 Tab by mouth daily. , Disp: , Rfl:     Comp. Stocking,Knee,Regular,Lrg (Relief Knee Open Toe Stocking) misc, Use daily for lower extremity swelling, Size large 30-40mm, Model # [id number], Disp: 2 Each, Rfl: 1    ascorbic acid, vitamin C, (VITAMIN C) 500 mg tablet, Take  by mouth., Disp: , Rfl:     gluc case/chondro case A/vit C/Mn (GLUCOSAMINE 1500 COMPLEX PO), Take  by mouth., Disp: , Rfl:     folic acid (FOLVITE) 1 mg tablet, Take  by mouth daily. , Disp: , Rfl:     cholecalciferol (VITAMIN D3) (2,000 UNITS /50 MCG) cap capsule, Take 2,000 Units by mouth daily. , Disp: , Rfl:     magnesium 250 mg tab, Take  by mouth., Disp: , Rfl:     calcium carbonate (CALCIUM 500 PO), Take  by mouth., Disp: , Rfl:     turmeric (CURCUMIN), 1 Tablet by Does Not Apply route daily. , Disp: , Rfl:     History  Patient Care Team:  Sulma Valerio MD as PCP - General (Family Medicine)  Sulma Valerio MD as PCP - [address] [place] [address] Provider  Murali Mckeon MD as Physician (Otolaryngology)  Michaelle Anderson MD as Physician (Cardiology)  Nery Tellez RN as Nurse Navigator (Orthopedic Surgery)    Past Medical History: he has a past medical history of Acoustic neuroma [facility]) (02/18/2020), Basal cell adenocarcinoma, Chronic deep vein thrombosis (DVT) of lower extremity ([address] (2/14/2020), COVID-19 vaccine series completed, Hearing loss, right, Hypercholesteremia (2/14/2020), Melanoma in situ ([address] ISABELLA on CPAP (2/18/2020), Paroxysmal tachycardia, unspecified ([address] (2/14/2020), Polymyalgia rheumatica ([facility] Psychiatric disorder, and Skin cancer.     Past Surgical History: he has a past surgical history that includes hx colonoscopy; hx orthopaedic (Right); hx hernia repair; hx gi; hx endoscopy; hx wisdom teeth extraction; and hx tonsillectomy. Family Medical History: family history includes Arthritis-osteo in his sister; Heart Disease in his father. Social History: he reports that he quit smoking about 40 years ago. His smoking use included pipe. He quit after 12.00 years of use. He has never used smokeless tobacco. He reports current alcohol use of about 21.0 standard drinks of alcohol per week. He reports that he does not use drugs. Objective:     Physical Exam    Constitutional:       General: Not in acute distress. Appearance: Normal appearance. Not ill-appearing,  Not toxic-appearing. HENT:      Head: Normocephalic. Right Ear: External ear normal.      Left Ear: External ear normal.      Nose: Nose normal.   Eyes:      General: No scleral icterus. Right eye: No discharge. Left eye: No discharge. Extraocular Movements: Extraocular movements intact. Conjunctiva/sclera: Conjunctivae normal.   Neck:      Musculoskeletal: Normal range of motion. Pulmonary:      Effort: Pulmonary effort is normal. No respiratory distress. Neurological:      Mental Status: Mental status is at baseline. Psychiatric:         Mood and Affect: Mood normal.         Behavior: Behavior normal.         Thought Content: Thought content normal.         Judgment: Judgment normal.       Assessment & Plan:      ICD-10-CM ICD-9-CM    1. Change in voice  R49.9 784.49    2. Acoustic neuroma ([facility])  D33.3 225.1    3. Type 2 diabetes mellitus without complication, without long-term current use of insulin ([facility])  E11.9 250.00 HEMOGLOBIN A1C WITH EAG      METABOLIC PANEL, COMPREHENSIVE      CBC W/O DIFF      MICROALBUMIN, UR, RAND W/ MICROALB/CREAT RATIO   4. Long term (current) use of anticoagulants  Z79.01 V58.61    5. Anticoagulated on Coumadin  Z79.01 V58.61    6. Chronic deep vein thrombosis (DVT) of distal vein of lower extremity, unspecified laterality ([facility])  I82.5Z9 453.52    7.  Paroxysmal atrial fibrillation ([facility]) I48.0 427.31    8. ISABELLA on CPAP  G47.33 327.23     Z99.89 V46.8    9. Status post left hip replacement  Z96.642 V43.64    10. Hypercholesteremia  E78.00 272.0        · Voice Change: Chronic, uncontrolled. Advised patient to discuss further with ENT at his upcoming  visit   All other conditions listed above: chronic and stable. Will continue current treatment regimen. Will check labs as reflected above. Discussed following up with specialist as scheduled. Discussed recommendations for diet and exercise. I was in the office while conducting this encounter. Consent:  He and/or his healthcare decision maker is aware that this patient-initiated Telehealth encounter is a billable service, with coverage as determined by his insurance carrier. He is aware that he may receive a bill and has provided verbal consent to proceed: Yes    This virtual visit was conducted via [employer] me. Pursuant to the emergency declaration under the [facility], [facility] waiver authority and the [employer] and [employer] Act, this Virtual  Visit was conducted to reduce the patient's risk of exposure to COVID-19 and provide continuity of care for an established patient. Services were provided through a video synchronous discussion virtually to substitute for in-person clinic visit. Due to this being a TeleHealth evaluation, many elements of the physical examination are unable to be assessed. Total Time: minutes: 21-30 minutes. I have discussed the diagnosis with the patient and the intended plan as seen in the above orders. The patient has received an after-visit summary along with patient information handout. I have discussed medication side effects and warnings with the patient as well. Disposition  Follow-up and Dispositions    · Return in about 3 months (around 11/9/2021).            Daiman Bonilla MD

## 2021-08-10 ENCOUNTER — ANTI-COAG VISIT (OUTPATIENT)
Dept: CARDIOLOGY CLINIC | Age: 81
End: 2021-08-10
Payer: MEDICARE

## 2021-08-10 ENCOUNTER — ANCILLARY PROCEDURE (OUTPATIENT)
Dept: CARDIOLOGY CLINIC | Age: 81
End: 2021-08-10
Payer: MEDICARE

## 2021-08-10 ENCOUNTER — OFFICE VISIT (OUTPATIENT)
Dept: CARDIOLOGY CLINIC | Age: 81
End: 2021-08-10
Payer: MEDICARE

## 2021-08-10 ENCOUNTER — TELEPHONE (OUTPATIENT)
Dept: CARDIOLOGY CLINIC | Age: 81
End: 2021-08-10

## 2021-08-10 VITALS
BODY MASS INDEX: 27.06 KG/M2 | HEIGHT: 70 IN | WEIGHT: 189 LBS | SYSTOLIC BLOOD PRESSURE: 110 MMHG | DIASTOLIC BLOOD PRESSURE: 70 MMHG

## 2021-08-10 VITALS
HEIGHT: 70 IN | HEART RATE: 72 BPM | DIASTOLIC BLOOD PRESSURE: 82 MMHG | WEIGHT: 189 LBS | OXYGEN SATURATION: 97 % | SYSTOLIC BLOOD PRESSURE: 124 MMHG | BODY MASS INDEX: 27.06 KG/M2 | RESPIRATION RATE: 14 BRPM

## 2021-08-10 DIAGNOSIS — I35.8 AORTIC VALVE SCLEROSIS: ICD-10-CM

## 2021-08-10 DIAGNOSIS — I48.3 TYPICAL ATRIAL FLUTTER (HCC): ICD-10-CM

## 2021-08-10 DIAGNOSIS — Z79.01 LONG TERM (CURRENT) USE OF ANTICOAGULANTS: Primary | ICD-10-CM

## 2021-08-10 DIAGNOSIS — I82.5Y9 CHRONIC DEEP VEIN THROMBOSIS (DVT) OF PROXIMAL VEIN OF LOWER EXTREMITY, UNSPECIFIED LATERALITY (HCC): ICD-10-CM

## 2021-08-10 DIAGNOSIS — I48.0 PAROXYSMAL ATRIAL FIBRILLATION (HCC): Primary | ICD-10-CM

## 2021-08-10 LAB
INR BLD: 1.7
PT POC: NORMAL
VALID INTERNAL CONTROL?: YES

## 2021-08-10 PROCEDURE — G0463 HOSPITAL OUTPT CLINIC VISIT: HCPCS | Performed by: INTERNAL MEDICINE

## 2021-08-10 PROCEDURE — 99214 OFFICE O/P EST MOD 30 MIN: CPT | Performed by: INTERNAL MEDICINE

## 2021-08-10 PROCEDURE — G8427 DOCREV CUR MEDS BY ELIG CLIN: HCPCS | Performed by: INTERNAL MEDICINE

## 2021-08-10 PROCEDURE — G8419 CALC BMI OUT NRM PARAM NOF/U: HCPCS | Performed by: INTERNAL MEDICINE

## 2021-08-10 PROCEDURE — G8536 NO DOC ELDER MAL SCRN: HCPCS | Performed by: INTERNAL MEDICINE

## 2021-08-10 PROCEDURE — 1101F PT FALLS ASSESS-DOCD LE1/YR: CPT | Performed by: INTERNAL MEDICINE

## 2021-08-10 PROCEDURE — 93306 TTE W/DOPPLER COMPLETE: CPT | Performed by: INTERNAL MEDICINE

## 2021-08-10 PROCEDURE — 85610 PROTHROMBIN TIME: CPT | Performed by: INTERNAL MEDICINE

## 2021-08-10 PROCEDURE — G8510 SCR DEP NEG, NO PLAN REQD: HCPCS | Performed by: INTERNAL MEDICINE

## 2021-08-10 NOTE — Clinical Note
Another wonderful patient, I love chatting about the [place] with this pedro pablo. Thanks! Im almost in my new office, and actually have a physical location now- [address](next to [employer]). I remain affiliated with [facility] and keep my [facility] priviliges. Nayeli Magana! Happy Labor Day and I hope you are doing well.

## 2021-08-10 NOTE — TELEPHONE ENCOUNTER
Per Dr. Axel Jay:  Enroll patient for home INR monitoring. Left messge with Daved Coma with [employer]. Bernadette Calabrese returned call on 8-12-21. Left message with him on 8-13-21.

## 2021-08-10 NOTE — PATIENT INSTRUCTIONS
A full discussion of the nature of anticoagulants has been carried out. A benefit risk analysis has been presented to the patient, so that they understand the justification for choosing anticoagulation at this time. The need for frequent and regular monitoring, precise dosage adjustment and compliance is stressed. Side effects of potential bleeding are discussed. The patient should avoid any OTC items containing aspirin or ibuprofen, and should avoid great swings in general diet. Avoid alcohol consumption. Call if any signs of abnormal bleeding. Next PT/INR test in  2 weeks. Follow up with Dr. Anuj Michelle and echocardiogram today. Patient had hip surgery. States that he did bridge with lovenox. Has been [facility] by [facility] for [facility]. Warfarin monitored by Dr. Estuardo Gutierrez. Reports no change in diet or medications. Dosing regimen updated. To take extra 1 mg tablet on Wednesday, then resume current dosing regimen of 3.5 mg daily except on Mondays/Wednesdays, take 2.5 mg.  Recheck INR in 2 weeks.

## 2021-08-10 NOTE — PROGRESS NOTES
[facility] [place]:   [phone]    HPI: Desiree Barron, a 68y.o. year-old who presents for evaluation of AFib. Had his hip surgery in June 2021 with Dr. Teresa Zimmerman  Has a bit of shortness of breath but sats ok and stamina is down after the hip surgery. Walking 1-2 miles a day, notices that he is moving slower going uphill. Franck Lacks on ekg today. No palpitations  No chest pain   No dizziness or falls  His dyspnea with exertion is chronic   Wears compression sock on right leg and edema is well controlled  Had right leg crushed with DVT and phlebitis after a rugby injury - on coumadin for that, stable INR with coumadin therapy for years  Volunteers for [facility]    Today we reviewed his Coumadin therapy as well as the excess anticoagulants he is taking. At this point there is not really any added benefit of fish oil turmeric and aspirin for him but there certainly an additive risk of harm in the setting of increased bleeding risk. His EKG last year confirmed rate controlled 4-1 a flutter m for which she is taking sotalol and today he is in sinus bradycardia with acceptable intervals. This point we will continue the Coumadin he seems asymptomatic related to his arrhythmia, and he should continue along with his current therapy. Inda Brittle He does need an echo to evaluate his heart function as that is been a few years and look at his heart valves. Stress test last year looked fine       Assessment/Plan:  1. Right sided DVT/PE - on coumadin, last INR 2.2 earlier this month   2. ISABELLA on CPAP  3. Parox AFib/aflutter - on sotalol 80mg BID   -on coumadin for anticoagulation, INR 2.2 earlier this month  -advised him to stop ASA every other day and vitamin E since the combination of those medications increases his risk of bleeding with coumadin   -will repeat TTE at his next visit to reassess chamber size, etc  4.   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 Dyslipidemia- on atorvastatin 10mg daily, LDL 64 at goal  6. AV sclerosis - by TTE in 2019, will repeat TTE   7. PMR - on prednisone now per PCP     Nuc Stress Test 8/20 - no ischemia     Fhx no early CAD  Soc remote pipe smoker    He  has a past medical history of Acoustic neuroma ([facility] (02/18/2020), Basal cell adenocarcinoma, Chronic deep vein thrombosis (DVT) of lower extremity ([facility] (2/14/2020), COVID-19 vaccine series completed, Hearing loss, right, Hypercholesteremia (2/14/2020), Melanoma in situ ([facility] ISABELLA on CPAP (2/18/2020), Paroxysmal tachycardia, unspecified ([facility] (2/14/2020), Polymyalgia rheumatica ([facility] Psychiatric disorder, and Skin cancer. Cardiovascular ROS: positive for - dyspnea on exertion  Respiratory ROS: positive for - shortness of breath  Neurological ROS: no TIA or stroke symptoms  All other systems negative except as above. PE  Vitals:    08/10/21 1438   BP: 124/82   Pulse: 72   Resp: 14   SpO2: 97%   Weight: 189 lb (85.7 kg)   Height: 5' 10\" (1.778 m)    Body mass index is 27.12 kg/m².    General appearance - alert, well appearing, and in no distress  Mental status - affect appropriate to mood  Eyes - sclera anicteric, moist mucous membranes  Neck - supple, no significant adenopathy  Lymphatics - no  lymphadenopathy  Chest - clear to auscultation, no wheezes, rales or rhonchi  Heart - normal rate, regular rhythm, normal S1, S2, no murmurs, rubs, clicks or gallops  Abdomen - soft, nontender, nondistended  Back exam - full range of motion, no tenderness  Neurological - cranial nerves II through XII grossly intact, no focal deficit  Musculoskeletal - no muscular tenderness noted, normal strength  Extremities - peripheral pulses normal, mild RLE swelling  Skin - normal coloration  no rashes    12 lead ECG: sinus bradycardia VR 54 bpm, QTc 420ms    Recent Labs:  Lab Results   Component Value Date/Time    Cholesterol, total 140 11/11/2020 07:54 AM    HDL Cholesterol 61 2020 07:54 AM    LDL, calculated 64.8 2020 07:54 AM    Triglyceride 71 2020 07:54 AM    CHOL/HDL Ratio 2.3 2020 07:54 AM     Lab Results   Component Value Date/Time    Creatinine 0.79 2021 03:08 AM     Lab Results   Component Value Date/Time    BUN 19 2021 03:08 AM     Lab Results   Component Value Date/Time    Potassium 4.7 2021 03:08 AM     Lab Results   Component Value Date/Time    Hemoglobin A1c 7.1 (H) 06/15/2021 12:08 PM     Lab Results   Component Value Date/Time    HGB 9.0 (L) 2021 03:08 AM     Lab Results   Component Value Date/Time    PLATELET 174  12:08 PM       Reviewed:  Past Medical History:   Diagnosis Date    Acoustic neuroma ([facility] 2020    Basal cell adenocarcinoma     Chronic deep vein thrombosis (DVT) of lower extremity ([facility] 2020    1.8 to 2.5 is goal    COVID-19 vaccine series completed     MODERNA 21 & 21    Hearing loss, right     Hypercholesteremia 2020    Lipitor 20mg    Melanoma in situ ([address]     TOP OF HEAD AND ON OUTSIDE OF LEFT KNEE    ISABELLA on CPAP 2020    Paroxysmal tachycardia, unspecified ([facility] 2020    Sotalol Saw Clayton Montano MD     Polymyalgia rheumatica [facility])     Psychiatric disorder     MILD ANXIETY     Skin cancer     SQUAMUS CELL      Social History     Tobacco Use   Smoking Status Former Smoker    Years: 12.00    Types: Pipe    Quit date: 1/15/1981    Years since quittin.5   Smokeless Tobacco Never Used   Tobacco Comment    PIPE     Social History     Substance and Sexual Activity   Alcohol Use Yes    Alcohol/week: 21.0 standard drinks    Types: 7 Glasses of wine, 7 Cans of beer, 7 Shots of liquor per week    Comment: HAS ONE OR THE OTHER ALTERNATES A DIFFERENT ON EACH DAY      Allergies   Allergen Reactions    Sulfa (Sulfonamide Antibiotics) Rash       Current Outpatient Medications   Medication Sig    metFORMIN ER (GLUCOPHAGE XR) 500 mg tablet Take 1 Tablet by mouth three (3) times daily (with meals).  PARoxetine (PaxiL) 20 mg tablet Take 1 Tablet by mouth two (2) times a day.  glucose blood VI test strips (blood glucose test) strip Use to check fasting and evening blood sugar daily. Patient may use whichever product is covered by insurance.  Blood-Glucose Meter monitoring kit Use to check fasting and evening blood sugar daily. Patient may use whichever product is covered by insurance.  senna-docusate (PERICOLACE) 8.6-50 mg per tablet Take 1 Tablet by mouth two (2) times daily as needed for Constipation.  acetaminophen (TYLENOL) 500 mg tablet Take 1 Tablet by mouth every four (4) hours.  iron 18 mg tab Take 1 mg by mouth.  Lactobacillus acidophilus (PROBIOTIC PO) Take 15 billion cell by mouth daily.  digestive enzymes cap Take 220 mg by mouth daily.  OTHER,NON-FORMULARY, Take 10 mg by mouth daily. HEMP EXTRACT GUMMIES    atorvastatin (LIPITOR) 10 mg tablet Take 1 Tablet by mouth daily.  warfarin (COUMADIN) 1 mg tablet 3.5 mg daily except M/W/F take 2.5 mg    warfarin (Coumadin) 2.5 mg tablet 3.5 mg daily except M/W/F take 2.5 mg    allopurinoL (ZYLOPRIM) 100 mg tablet Take 0.5 Tabs by mouth daily.  sotaloL (BETAPACE) 80 mg tablet Take 1 Tab by mouth two (2) times a day.  b complex-folic acid 0.4 mg tablet Take 1 Tab by mouth daily.  Comp. Stocking,Knee,Regular,Lrg (Relief Knee Open Toe Stocking) misc Use daily for lower extremity swelling, Size large 30-40mm, Model # [id number]    ascorbic acid, vitamin C, (VITAMIN C) 500 mg tablet Take  by mouth.  gluc case/chondro case A/vit C/Mn (GLUCOSAMINE 1500 COMPLEX PO) Take  by mouth.  folic acid (FOLVITE) 1 mg tablet Take  by mouth daily.  cholecalciferol (VITAMIN D3) (2,000 UNITS /50 MCG) cap capsule Take 2,000 Units by mouth daily.  magnesium 250 mg tab Take  by mouth.  calcium carbonate (CALCIUM 500 PO) Take  by mouth.     turmeric (CURCUMIN) 1 Tablet by Does Not Apply route daily.  lancets misc Use to check fasting and evening blood sugar daily. Patient may use whichever product is covered by insurance. No current facility-administered medications for this visit.        Daralyn Osgood, MD  [address] [facility] [place]  [facility] [address] [facility]  [facility], [address]

## 2021-08-11 ENCOUNTER — PATIENT MESSAGE (OUTPATIENT)
Dept: CARDIOLOGY CLINIC | Age: 81
End: 2021-08-11

## 2021-08-11 LAB
ECHO AO ASC DIAM: 3.49 CM
ECHO AO ROOT DIAM: 3.67 CM
ECHO AV AREA PEAK VELOCITY: 1.6 CM2
ECHO AV AREA VTI: 1.39 CM2
ECHO AV AREA/BSA PEAK VELOCITY: 0.8 CM2/M2
ECHO AV AREA/BSA VTI: 0.7 CM2/M2
ECHO AV MEAN GRADIENT: 11.58 MMHG
ECHO AV PEAK GRADIENT: 19.75 MMHG
ECHO AV PEAK VELOCITY: 222.22 CM/S
ECHO AV VTI: 47.24 CM
ECHO EST RA PRESSURE: 3 MMHG
ECHO IVC PROX: 1.29 CM
ECHO LA MAJOR AXIS: 3.08 CM
ECHO LA MINOR AXIS: 1.51 CM
ECHO LV INTERNAL DIMENSION DIASTOLIC: 3.59 CM (ref 4.2–5.9)
ECHO LV INTERNAL DIMENSION SYSTOLIC: 2.15 CM
ECHO LV IVSD: 0.82 CM (ref 0.6–1)
ECHO LV MASS 2D: 96.8 G (ref 88–224)
ECHO LV MASS INDEX 2D: 47.4 G/M2 (ref 49–115)
ECHO LV POSTERIOR WALL DIASTOLIC: 1.04 CM (ref 0.6–1)
ECHO LVOT DIAM: 2.26 CM
ECHO LVOT PEAK GRADIENT: 3.14 MMHG
ECHO LVOT PEAK VELOCITY: 88.6 CM/S
ECHO LVOT SV: 65.8 ML
ECHO LVOT VTI: 16.43 CM
ECHO RIGHT VENTRICULAR SYSTOLIC PRESSURE (RVSP): 21 MMHG
ECHO TV REGURGITANT MAX VELOCITY: 209.71 CM/S
ECHO TV REGURGITANT PEAK GRADIENT: 17.59 MMHG
LVOT MG: 1.47 MMHG

## 2021-08-15 PROBLEM — Z96.642 STATUS POST LEFT HIP REPLACEMENT: Status: ACTIVE | Noted: 2021-08-15

## 2021-08-24 ENCOUNTER — ANTI-COAG VISIT (OUTPATIENT)
Dept: CARDIOLOGY CLINIC | Age: 81
End: 2021-08-24
Payer: MEDICARE

## 2021-08-24 DIAGNOSIS — Z79.01 LONG TERM (CURRENT) USE OF ANTICOAGULANTS: ICD-10-CM

## 2021-08-24 DIAGNOSIS — I82.5Z9 CHRONIC DEEP VEIN THROMBOSIS (DVT) OF DISTAL VEIN OF LOWER EXTREMITY, UNSPECIFIED LATERALITY (HCC): Primary | ICD-10-CM

## 2021-08-24 LAB
INR BLD: 1.2
PT POC: ABNORMAL
VALID INTERNAL CONTROL?: YES

## 2021-08-24 PROCEDURE — 85610 PROTHROMBIN TIME: CPT | Performed by: INTERNAL MEDICINE

## 2021-08-24 NOTE — PROGRESS NOTES
A full discussion of the nature of anticoagulants has been carried out. A benefit risk analysis has been presented to the patient, so that they understand the justification for choosing anticoagulation at this time. The need for frequent and regular monitoring, precise dosage adjustment and compliance is stressed. Side effects of potential bleeding are discussed. The patient should avoid any OTC items containing aspirin or ibuprofen, and should avoid great swings in general diet. Avoid alcohol consumption. Call if any signs of abnormal bleeding. Next PT/INR test in 1 week. Pt dose was changed to 2.5 mg S/M/F and 5 mg all others. Anticoagulation Summary  As of 2021    INR goal:  2.0-3.0   TTR:  56.6 % (1.3 y)   INR used for dosin.2 (2021)   Warfarin maintenance plan:  2.5 mg (2.5 mg x 1) every Sun, Mon, Fri; 5 mg (2.5 mg x 2) all other days   Weekly warfarin total:  27.5 mg   Plan last modified:  Nicole Wilkerson RN (2021)   Next INR check:  2021   Target end date:   Indefinite    Indications    Chronic deep vein thrombosis (DVT) of lower extremity (HCC) [I82.509]  Long term (current) use of anticoagulants [Z79.01]             Anticoagulation Episode Summary     INR check location:      Preferred lab:      Send INR reminders to:      Comments:        Anticoagulation Care Providers     Provider Role Specialty Phone number    Catina Hewitt MD Responsible Family Medicine [phone]    Suresh Castaneda MD Responsible Cardiology [phone]          Future Appointments   Date Time Provider Corinna Johnston   2021 10:20 AM VERENICE ESTRADA AMB

## 2021-08-31 ENCOUNTER — ANTI-COAG VISIT (OUTPATIENT)
Dept: CARDIOLOGY CLINIC | Age: 81
End: 2021-08-31
Payer: MEDICARE

## 2021-08-31 DIAGNOSIS — E79.0 ELEVATED URIC ACID IN BLOOD: ICD-10-CM

## 2021-08-31 DIAGNOSIS — Z79.01 LONG TERM (CURRENT) USE OF ANTICOAGULANTS: ICD-10-CM

## 2021-08-31 DIAGNOSIS — I82.5Y9 CHRONIC DEEP VEIN THROMBOSIS (DVT) OF PROXIMAL VEIN OF LOWER EXTREMITY, UNSPECIFIED LATERALITY (HCC): Primary | ICD-10-CM

## 2021-08-31 LAB
INR BLD: 1.4
PT POC: ABNORMAL
VALID INTERNAL CONTROL?: YES

## 2021-08-31 PROCEDURE — 85610 PROTHROMBIN TIME: CPT | Performed by: INTERNAL MEDICINE

## 2021-08-31 RX ORDER — ALLOPURINOL 100 MG/1
50 TABLET ORAL DAILY
Qty: 45 TABLET | Refills: 1 | Status: SHIPPED | OUTPATIENT
Start: 2021-08-31 | End: 2022-03-01 | Stop reason: SDUPTHER

## 2021-08-31 RX ORDER — SOTALOL HYDROCHLORIDE 80 MG/1
80 TABLET ORAL 2 TIMES DAILY
Qty: 180 TABLET | Refills: 1 | Status: SHIPPED | OUTPATIENT
Start: 2021-08-31 | End: 2022-03-01 | Stop reason: SDUPTHER

## 2021-08-31 NOTE — PROGRESS NOTES
A full discussion of the nature of anticoagulants has been carried out. A benefit risk analysis has been presented to the patient, so that they understand the justification for choosing anticoagulation at this time. The need for frequent and regular monitoring, precise dosage adjustment and compliance is stressed. Side effects of potential bleeding are discussed. The patient should avoid any OTC items containing aspirin or ibuprofen, and should avoid great swings in general diet. Avoid alcohol consumption. Call if any signs of abnormal bleeding. Next PT/INR test in 1 week. Pt dose was increased to 5 mg x 6 days and 2.5 mg x 1 day     Anticoagulation Summary  As of 2021    INR goal:  2.0-3.0   TTR:  55.7 % (1.3 y)   INR used for dosin.4 (2021)   Warfarin maintenance plan:  2.5 mg (2.5 mg x 1) every Mon; 5 mg (2.5 mg x 2) all other days   Weekly warfarin total:  32.5 mg   Plan last modified:  Dany Mae RN (2021)   Next INR check:  2021   Target end date:   Indefinite    Indications    Chronic deep vein thrombosis (DVT) of lower extremity (HCC) [I82.509]  Long term (current) use of anticoagulants [Z79.01]             Anticoagulation Episode Summary     INR check location:      Preferred lab:      Send INR reminders to:      Comments:        Anticoagulation Care Providers     Provider Role Specialty Phone number    Amarilis Robles MD Responsible Family Medicine [phone]    Scott Abbott MD Responsible Cardiology [phone]          Future Appointments   Date Time Provider Corinna Johnston   2021  9:00 AM VERENICE ESTRADA AMB

## 2021-08-31 NOTE — TELEPHONE ENCOUNTER
Last Visit: VV 8/9/21 MD Lam Flores  Next Appointment: Not scheduled- due 11/2021  Previous Refill Encounter(s): 2/24/21 (both)   Allopurinol 45 + 1  Sotalol 180 + 1    Requested Prescriptions     Pending Prescriptions Disp Refills    allopurinoL (ZYLOPRIM) 100 mg tablet 45 Tablet 1     Sig: Take 0.5 Tablets by mouth daily.  sotaloL (BETAPACE) 80 mg tablet 180 Tablet 1     Sig: Take 1 Tablet by mouth two (2) times a day.

## 2021-09-07 ENCOUNTER — ANTI-COAG VISIT (OUTPATIENT)
Dept: CARDIOLOGY CLINIC | Age: 81
End: 2021-09-07
Payer: MEDICARE

## 2021-09-07 DIAGNOSIS — I82.5Z9 CHRONIC DEEP VEIN THROMBOSIS (DVT) OF DISTAL VEIN OF LOWER EXTREMITY, UNSPECIFIED LATERALITY (HCC): Primary | ICD-10-CM

## 2021-09-07 DIAGNOSIS — Z79.01 LONG TERM (CURRENT) USE OF ANTICOAGULANTS: ICD-10-CM

## 2021-09-07 LAB
INR BLD: 2.1
PT POC: NORMAL
VALID INTERNAL CONTROL?: YES

## 2021-09-07 PROCEDURE — 85610 PROTHROMBIN TIME: CPT | Performed by: INTERNAL MEDICINE

## 2021-09-07 NOTE — PROGRESS NOTES
A full discussion of the nature of anticoagulants has been carried out. A benefit risk analysis has been presented to the patient, so that they understand the justification for choosing anticoagulation at this time. The need for frequent and regular monitoring, precise dosage adjustment and compliance is stressed. Side effects of potential bleeding are discussed. The patient should avoid any OTC items containing aspirin or ibuprofen, and should avoid great swings in general diet. Avoid alcohol consumption. Call if any signs of abnormal bleeding. Next PT/INR test in 2 weeks. Pt dose was changed to 2.5 mg on M/ and 5 mg all others. Patient states his range should be 1.8-2.5. That has been his range for several years due to a bleed in the past per patient. Anticoagulation Summary  As of 2021    INR goal:  2.0-3.0   TTR:  55.1 % (1.3 y)   INR used for dosin.1 (2021)   Warfarin maintenance plan:  2.5 mg (2.5 mg x 1) every Mon, Fri; 5 mg (2.5 mg x 2) all other days   Weekly warfarin total:  30 mg   Plan last modified:  Parth Bee RN (2021)   Next INR check:  2021   Target end date:   Indefinite    Indications    Chronic deep vein thrombosis (DVT) of lower extremity (HCC) [I82.509]  Long term (current) use of anticoagulants [Z79.01]             Anticoagulation Episode Summary     INR check location:      Preferred lab:      Send INR reminders to:      Comments:        Anticoagulation Care Providers     Provider Role Specialty Phone number    Leon Rutledge MD Responsible Family Medicine [phone]    Ana Bond MD Responsible Cardiology [phone]          Future Appointments   Date Time Provider Corinna Johnston   2021 10:20 AM VERENICE ESTRADA AMB

## 2021-09-21 ENCOUNTER — ANTI-COAG VISIT (OUTPATIENT)
Dept: CARDIOLOGY CLINIC | Age: 81
End: 2021-09-21
Payer: MEDICARE

## 2021-09-21 DIAGNOSIS — I82.5Z9 CHRONIC DEEP VEIN THROMBOSIS (DVT) OF DISTAL VEIN OF LOWER EXTREMITY, UNSPECIFIED LATERALITY (HCC): Primary | ICD-10-CM

## 2021-09-21 DIAGNOSIS — Z79.01 LONG TERM (CURRENT) USE OF ANTICOAGULANTS: ICD-10-CM

## 2021-09-21 LAB
INR BLD: 2.9
PT POC: ABNORMAL
VALID INTERNAL CONTROL?: YES

## 2021-09-21 PROCEDURE — 85610 PROTHROMBIN TIME: CPT | Performed by: INTERNAL MEDICINE

## 2021-09-21 NOTE — PROGRESS NOTES
A full discussion of the nature of anticoagulants has been carried out. A benefit risk analysis has been presented to the patient, so that they understand the justification for choosing anticoagulation at this time. The need for frequent and regular monitoring, precise dosage adjustment and compliance is stressed. Side effects of potential bleeding are discussed. The patient should avoid any OTC items containing aspirin or ibuprofen, and should avoid great swings in general diet. Avoid alcohol consumption. Call if any signs of abnormal bleeding. Next PT/INR test in 2 weeks. Pt dose was decreased per his request to 2.5 mg M/F and 3.5 mg all others     Anticoagulation Summary  As of 9/21/2021    INR goal:  1.8-2.5   TTR:  55.0 % (1.3 y)   INR used for dosing:     Warfarin maintenance plan:  2.5 mg (2.5 mg x 1) every Mon, Fri; 3.5 mg (2.5 mg x 1 and 1 mg x 1) all other days   Weekly warfarin total:  22.5 mg   Plan last modified:  Cecy Roach RN (9/21/2021)   Next INR check:  10/5/2021   Target end date:   Indefinite    Indications    Chronic deep vein thrombosis (DVT) of lower extremity (HCC) [I82.509]  Long term (current) use of anticoagulants [Z79.01]             Anticoagulation Episode Summary     INR check location:      Preferred lab:      Send INR reminders to:      Comments:        Anticoagulation Care Providers     Provider Role Specialty Phone number    Ladan Prince MD Responsible Family Medicine [phone]    Gisel Sawyer MD Responsible Cardiology [phone]          Future Appointments   Date Time Provider Corinna Johnston   10/5/2021 10:40 AM VERENICE ESTRADA AMB

## 2021-10-01 ENCOUNTER — PATIENT MESSAGE (OUTPATIENT)
Dept: FAMILY MEDICINE CLINIC | Age: 81
End: 2021-10-01

## 2021-10-01 DIAGNOSIS — R26.89 BALANCE PROBLEM: Primary | ICD-10-CM

## 2021-10-05 ENCOUNTER — ANTI-COAG VISIT (OUTPATIENT)
Dept: CARDIOLOGY CLINIC | Age: 81
End: 2021-10-05
Payer: MEDICARE

## 2021-10-05 ENCOUNTER — PATIENT MESSAGE (OUTPATIENT)
Dept: FAMILY MEDICINE CLINIC | Age: 81
End: 2021-10-05

## 2021-10-05 DIAGNOSIS — I82.509 CHRONIC DEEP VEIN THROMBOSIS (DVT) OF LOWER EXTREMITY, UNSPECIFIED LATERALITY, UNSPECIFIED VEIN (HCC): Primary | ICD-10-CM

## 2021-10-05 DIAGNOSIS — Z79.01 LONG TERM (CURRENT) USE OF ANTICOAGULANTS: ICD-10-CM

## 2021-10-05 LAB
INR BLD: 1.9
PT POC: NORMAL
VALID INTERNAL CONTROL?: YES

## 2021-10-05 PROCEDURE — 85610 PROTHROMBIN TIME: CPT | Performed by: NURSE PRACTITIONER

## 2021-10-05 NOTE — PROGRESS NOTES
A full discussion of the nature of anticoagulants has been carried out. A benefit risk analysis has been presented to the patient, so that they understand the justification for choosing anticoagulation at this time. The need for frequent and regular monitoring, precise dosage adjustment and compliance is stressed. Side effects of potential bleeding are discussed. The patient should avoid any OTC items containing aspirin or ibuprofen, and should avoid great swings in general diet. Avoid alcohol consumption. Call if any signs of abnormal bleeding. Next PT/INR test in 1 month with [facility]. Pt dose was not changed     Anticoagulation Summary  As of 10/5/2021    INR goal:  1.8-2.5   TTR:  55.1 % (1.4 y)   INR used for dosin.9 (10/5/2021)   Warfarin maintenance plan:  2.5 mg (2.5 mg x 1) every Mon, Fri; 3.5 mg (2.5 mg x 1 and 1 mg x 1) all other days   Weekly warfarin total:  22.5 mg   Plan last modified:  Wendy Shannon RN (2021)   Next INR check:  2021   Target end date: Indefinite    Indications    Chronic deep vein thrombosis (DVT) of lower extremity (HCC) [I82.509]  Long term (current) use of anticoagulants [Z79.01]             Anticoagulation Episode Summary     INR check location:      Preferred lab:      Send INR reminders to:      Comments:        Anticoagulation Care Providers     Provider Role Specialty Phone number    Rebecca Linder MD Responsible Family Medicine [phone]    Elizabeth Floyd MD Responsible Cardiology [phone]          No future appointments.

## 2021-10-07 NOTE — TELEPHONE ENCOUNTER
Geoff Pittman 10/7/2021 9:03 AM EDT      ----- Message -----  From: Imani Carroll  Sent: 10/5/2021 5:13 PM EDT  To: [facility] Nurse Pool  Subject: Non-Urgent Genny Pardo--    One more question. Attached is a photo of the #2 toe on my left foot. It has been inflamed for about five days. Looks like gout but doesn't hurt that way. got any ideas? Thanks fo0r your help!     Best, Imani Carroll

## 2021-10-07 NOTE — TELEPHONE ENCOUNTER
Called patient. He reports that the toe is starting to improve, but he will keep an eye on it.      Brea Benitez MD

## 2021-10-07 NOTE — TELEPHONE ENCOUNTER
Selenemaru Panchal 10/1/2021 1:32 PM EDT      ----- Message -----  From: Koki Boomamy  Sent: 10/1/2021 11:18 AM EDT  To: [facility] Nurse Pool  Subject: Non-Urgent Tiara Vallecito Dr. Calderon Krause you're well. Erich Alberto and I got our [facility] boosters on 9/15. Should I take CoQ10 as she does? My hip is working well; my stamina is returning slowly. My balance is not good. Should I enroll in PT at [address] [place] [address]? I have an appt. w/ Dr. Wesly Faye on 11/3. Morning (fasting) blood glucose is running 107-129, w/ average 120 over the past 18 days. I'm taking three Metformin pills per day with meals. How does that level stack up w/ where I should be? Re: coumadin, my INR is getting checked 10/5. If it's in range, Dr. Ondina Albert wants to set me up with a kit to use at home, w/ reports to her and you. I have an appointment with her at [facility] Cardiology on 10/26. Thanks for your help.  JOSE LUIS Gordon

## 2021-11-11 RX ORDER — WARFARIN 1 MG/1
TABLET ORAL
Qty: 48 TABLET | Refills: 1 | Status: SHIPPED | OUTPATIENT
Start: 2021-11-11 | End: 2022-03-22 | Stop reason: SDUPTHER

## 2021-11-11 NOTE — TELEPHONE ENCOUNTER
MD Allison Davis,    Patient requesting refill.   Tonie Luis    Last Visit: VV 8/9/21 MD Allison Davis  Next Appointment: Not scheduled  Previous Refill Encounter(s): 5/11/21 48 + 1    Requested Prescriptions     Pending Prescriptions Disp Refills    warfarin (COUMADIN) 1 mg tablet 48 Tablet 1     Sig: 3.5 mg daily except M/W/F take 2.5 mg

## 2021-11-29 DIAGNOSIS — E11.9 TYPE 2 DIABETES MELLITUS WITHOUT COMPLICATION, WITHOUT LONG-TERM CURRENT USE OF INSULIN (HCC): ICD-10-CM

## 2021-11-30 LAB
ALBUMIN SERPL-MCNC: 3.8 G/DL (ref 3.5–5)
ALBUMIN/GLOB SERPL: 1.3 {RATIO} (ref 1.1–2.2)
ALP SERPL-CCNC: 65 U/L (ref 45–117)
ALT SERPL-CCNC: 21 U/L (ref 12–78)
ANION GAP SERPL CALC-SCNC: 3 MMOL/L (ref 5–15)
AST SERPL-CCNC: 19 U/L (ref 15–37)
BILIRUB SERPL-MCNC: 0.4 MG/DL (ref 0.2–1)
BUN SERPL-MCNC: 19 MG/DL (ref 6–20)
BUN/CREAT SERPL: 28 (ref 12–20)
CALCIUM SERPL-MCNC: 9.1 MG/DL (ref 8.5–10.1)
CHLORIDE SERPL-SCNC: 106 MMOL/L (ref 97–108)
CO2 SERPL-SCNC: 28 MMOL/L (ref 21–32)
CREAT SERPL-MCNC: 0.69 MG/DL (ref 0.7–1.3)
ERYTHROCYTE [DISTWIDTH] IN BLOOD BY AUTOMATED COUNT: 15.5 % (ref 11.5–14.5)
EST. AVERAGE GLUCOSE BLD GHB EST-MCNC: 146 MG/DL
GLOBULIN SER CALC-MCNC: 2.9 G/DL (ref 2–4)
GLUCOSE SERPL-MCNC: 96 MG/DL (ref 65–100)
HBA1C MFR BLD: 6.7 % (ref 4–5.6)
HCT VFR BLD AUTO: 38.2 % (ref 36.6–50.3)
HGB BLD-MCNC: 12 G/DL (ref 12.1–17)
MCH RBC QN AUTO: 30.5 PG (ref 26–34)
MCHC RBC AUTO-ENTMCNC: 31.4 G/DL (ref 30–36.5)
MCV RBC AUTO: 97.2 FL (ref 80–99)
NRBC # BLD: 0 K/UL (ref 0–0.01)
NRBC BLD-RTO: 0 PER 100 WBC
PLATELET # BLD AUTO: 240 K/UL (ref 150–400)
PMV BLD AUTO: 10.8 FL (ref 8.9–12.9)
POTASSIUM SERPL-SCNC: 5.2 MMOL/L (ref 3.5–5.1)
PROT SERPL-MCNC: 6.7 G/DL (ref 6.4–8.2)
RBC # BLD AUTO: 3.93 M/UL (ref 4.1–5.7)
SODIUM SERPL-SCNC: 137 MMOL/L (ref 136–145)
WBC # BLD AUTO: 7.5 K/UL (ref 4.1–11.1)

## 2021-11-30 NOTE — PROGRESS NOTES
Dear Yariel Méndez,    Your A1c (average blood sugar) is improved from last check. Keep up the good work. Your potassium is just a little elevated. We will continue to monitor this. Your hemoglobin (oxygen carrying cells) is improved from last check. The remainder of you labs are stable.      Tyrone Perry MD

## 2021-12-01 ENCOUNTER — OFFICE VISIT (OUTPATIENT)
Dept: ORTHOPEDIC SURGERY | Age: 81
End: 2021-12-01
Payer: MEDICARE

## 2021-12-01 VITALS — HEIGHT: 70 IN | BODY MASS INDEX: 26.48 KG/M2 | WEIGHT: 185 LBS

## 2021-12-01 DIAGNOSIS — M47.816 LUMBAR SPONDYLOSIS: ICD-10-CM

## 2021-12-01 DIAGNOSIS — Z96.642 STATUS POST LEFT HIP REPLACEMENT: Primary | ICD-10-CM

## 2021-12-01 PROCEDURE — 99213 OFFICE O/P EST LOW 20 MIN: CPT | Performed by: ORTHOPAEDIC SURGERY

## 2021-12-01 PROCEDURE — G8419 CALC BMI OUT NRM PARAM NOF/U: HCPCS | Performed by: ORTHOPAEDIC SURGERY

## 2021-12-01 PROCEDURE — G8427 DOCREV CUR MEDS BY ELIG CLIN: HCPCS | Performed by: ORTHOPAEDIC SURGERY

## 2021-12-01 PROCEDURE — G8536 NO DOC ELDER MAL SCRN: HCPCS | Performed by: ORTHOPAEDIC SURGERY

## 2021-12-01 PROCEDURE — 1101F PT FALLS ASSESS-DOCD LE1/YR: CPT | Performed by: ORTHOPAEDIC SURGERY

## 2021-12-01 PROCEDURE — G8432 DEP SCR NOT DOC, RNG: HCPCS | Performed by: ORTHOPAEDIC SURGERY

## 2021-12-01 NOTE — LETTER
12/1/2021    Patient: Stephanie Clark   YOB: 1940   Date of Visit: 12/1/2021     Bibi Mayo, [address] [address]  Via In Basket    Dear Bibi Mayo MD,      Thank you for referring Mr. Stephanie Clark to [facility] for evaluation. My notes for this consultation are attached. If you have questions, please do not hesitate to call me. I look forward to following your patient along with you.       Sincerely,    Ladan Glover MD

## 2021-12-01 NOTE — PROGRESS NOTES
Jamey Bowles (: 1940) is a 80 y.o. male, patient, here for evaluation of the following chief complaint(s):  Hip Pain (Left hip/groin pain, [facility] 21)       SUBJECTIVE/OBJECTIVE:  Imani Carroll presents today complaining of mild nuisance symptoms in the anterior left thigh. He underwent left total hip replacement by me in  of this year. Overall he is functioning very well. With long walks he notices pain in the left anterior thigh, 1/10 at its worst. Does not limit what he wants to do. No pain at rest. Denies groin pain. Denies low back pain. He does relate subjective leg length discrepancy, with the left being short. PHYSICAL EXAM:  Vitals: Ht 5' 10\" (1.778 m)   Wt 185 lb (83.9 kg)   BMI 26.54 kg/m²   Body mass index is 26.54 kg/m². 80y.o. year old M, appears well. Ambulates without limp or Trendelenburg gait. Pain-free motion lumbar spine. No nerve tension signs. Left lateral hip scar is well-healed. No local tenderness. Negative Stinchfield. Pain-free hip range of motion which is well-preserved. Symmetrical palpable distal pulses. No gross motor or sensory deficits in lower extremities. No distal edema. Apparent leg lengths equal.    IMAGING:  Radiographs: XR Results (most recent):  Results from Appointment encounter on 21    XR HIP LT W OR WO PELV 2-3 VWS    Narrative  3 x-ray views left hip including AP pelvis, AP and frog lateral images demonstrate satisfactory position alignment of cementless left total hip components. No lucencies. Lumbar spondylosis evident on hip images. ASSESSMENT/PLAN:  1. Status post left hip replacement  -     XR HIP LT W OR WO PELV 2-3 VWS; Future  2. Lumbar spondylosis    The xray and exam findings were discussed with the patient today. Overall he is functioning well 6 months postop. He is happy with simple reassurance at this time.  It is possible symptoms are related to the lumbar spine, but could be part of routine recovery process from his total hip. We will continue observation. Return in 6 months for routine 1 year postop x-ray left hip, sooner if needed. Return for routine 1 year postop visit. Review Of Systems  ROS     Positive for: Musculoskeletal    Last edited by Alexandria Perla RN on 12/1/2021  9:50 AM. (History)         Patient denies any recent fever, chills, nausea, vomiting, chest pain, or shortness of breath. Allergies   Allergen Reactions    Sulfa (Sulfonamide Antibiotics) Rash       Current Outpatient Medications   Medication Sig    warfarin (COUMADIN) 1 mg tablet 3.5 mg daily except M/W/F take 2.5 mg    allopurinoL (ZYLOPRIM) 100 mg tablet Take 0.5 Tablets by mouth daily.  sotaloL (BETAPACE) 80 mg tablet Take 1 Tablet by mouth two (2) times a day.  metFORMIN ER (GLUCOPHAGE XR) 500 mg tablet Take 1 Tablet by mouth three (3) times daily (with meals).  PARoxetine (PaxiL) 20 mg tablet Take 1 Tablet by mouth two (2) times a day.  atorvastatin (LIPITOR) 10 mg tablet Take 1 Tablet by mouth daily.  warfarin (Coumadin) 2.5 mg tablet 3.5 mg daily except M/W/F take 2.5 mg (Patient taking differently: Take 2 tabs everyday except 1 tab on Mondays)    glucose blood VI test strips (blood glucose test) strip Use to check fasting and evening blood sugar daily. Patient may use whichever product is covered by insurance.  lancets misc Use to check fasting and evening blood sugar daily. Patient may use whichever product is covered by insurance.  Blood-Glucose Meter monitoring kit Use to check fasting and evening blood sugar daily. Patient may use whichever product is covered by insurance.  senna-docusate (PERICOLACE) 8.6-50 mg per tablet Take 1 Tablet by mouth two (2) times daily as needed for Constipation.  acetaminophen (TYLENOL) 500 mg tablet Take 1 Tablet by mouth every four (4) hours.  iron 18 mg tab Take 1 mg by mouth.     Lactobacillus acidophilus (PROBIOTIC PO) Take 15 billion cell by mouth daily.    digestive enzymes cap Take 220 mg by mouth daily.  OTHER,NON-FORMULARY, Take 10 mg by mouth daily. HEMP EXTRACT GUMMIES    b complex-folic acid 0.4 mg tablet Take 1 Tab by mouth daily.  Comp. Stocking,Knee,Regular,Lrg (Relief Knee Open Toe Stocking) misc Use daily for lower extremity swelling, Size large 30-40mm, Model # [id number]    ascorbic acid, vitamin C, (VITAMIN C) 500 mg tablet Take  by mouth.  gluc case/chondro case A/vit C/Mn (GLUCOSAMINE 1500 COMPLEX PO) Take  by mouth.  folic acid (FOLVITE) 1 mg tablet Take  by mouth daily.  cholecalciferol (VITAMIN D3) (2,000 UNITS /50 MCG) cap capsule Take 2,000 Units by mouth daily.  magnesium 250 mg tab Take  by mouth.  calcium carbonate (CALCIUM 500 PO) Take  by mouth.  turmeric (CURCUMIN) 1 Tablet by Does Not Apply route daily. No current facility-administered medications for this visit.        Past Medical History:   Diagnosis Date    Acoustic neuroma ([facility] [address] 02/18/2020    Basal cell adenocarcinoma     Chronic deep vein thrombosis (DVT) of lower extremity ([address] 2/14/2020    1.8 to 2.5 is goal    COVID-19 vaccine series completed     MODERNA 03/07/21 & 04/04/21    Hearing loss, right     Hypercholesteremia 2/14/2020    Lipitor 20mg    Melanoma in situ ([address]     TOP OF HEAD AND ON OUTSIDE OF LEFT KNEE    ISABELLA on CPAP 2/18/2020    Paroxysmal tachycardia, unspecified ([address] 2/14/2020    Sotalol Saw Sharron Man MD     Polymy[facility] rheumatica [facility])     Psychiatric disorder     MILD ANXIETY     Skin cancer     SQUAMUS CELL         Past Surgical History:   Procedure Laterality Date    HX COLONOSCOPY      hx of GI bleed    HX ENDOSCOPY      HX GI      EXCISION IN COLONON     HX HERNIA REPAIR      X3    HX ORTHOPAEDIC Right     SHOULDER     HX TONSILLECTOMY      HX WISDOM TEETH EXTRACTION         Family History   Problem Relation Age of Onset    Heart Disease Father         MI   Plummer Arthritis-osteo Sister    Plummer Anesth Problems Neg Hx         Social History     Socioeconomic History    Marital status:      Spouse name: Not on file    Number of children: Not on file    Years of education: Not on file    Highest education level: Not on file   Occupational History    Not on file   Tobacco Use    Smoking status: Former Smoker     Years: 12.00     Types: Pipe     Quit date: 1/15/1981     Years since quittin.9    Smokeless tobacco: Never Used    Tobacco comment: PIPE   Vaping Use    Vaping Use: Never used   Substance and Sexual Activity    Alcohol use: Yes     Alcohol/week: 21.0 standard drinks     Types: 7 Glasses of wine, 7 Cans of beer, 7 Shots of liquor per week     Comment: HAS ONE OR THE OTHER ALTERNATES A DIFFERENT ON EACH DAY     Drug use: Never    Sexual activity: Yes   Other Topics Concern    Not on file   Social History Narrative    Not on file     Social Determinants of Health     Financial Resource Strain:     Difficulty of Paying Living Expenses: Not on file   Food Insecurity:     Worried About Running Out of Food in the Last Year: Not on file    Gianfranco of Food in the Last Year: Not on file   Transportation Needs:     Lack of Transportation (Medical): Not on file    Lack of Transportation (Non-Medical):  Not on file   Physical Activity:     Days of Exercise per Week: Not on file    Minutes of Exercise per Session: Not on file   Stress:     Feeling of Stress : Not on file   Social Connections:     Frequency of Communication with Friends and Family: Not on file    Frequency of Social Gatherings with Friends and Family: Not on file    Attends Muslim Services: Not on file    Active Member of Clubs or Organizations: Not on file    Attends Club or Organization Meetings: Not on file    Marital Status: Not on file   Intimate Partner Violence:     Fear of Current or Ex-Partner: Not on file    Emotionally Abused: Not on file    Physically Abused: Not on file    Sexually Abused: Not on file Housing Stability:     Unable to Pay for Housing in the Last Year: Not on file    Number of Places Lived in the Last Year: Not on file    Unstable Housing in the Last Year: Not on file       Orders Placed This Encounter    XR HIP LT W OR WO PELV 2-3 VWS     Room 3BBBB     Standing Status:   Future     Number of Occurrences:   1     Standing Expiration Date:   12/2/2022        An electronic signature was used to authenticate this note.   -- Talon Jimenez MD

## 2021-12-06 DIAGNOSIS — E79.0 ELEVATED URIC ACID IN BLOOD: ICD-10-CM

## 2021-12-06 RX ORDER — ATORVASTATIN CALCIUM 10 MG/1
10 TABLET, FILM COATED ORAL DAILY
Qty: 90 TABLET | Refills: 0 | Status: SHIPPED | OUTPATIENT
Start: 2021-12-06 | End: 2022-03-01 | Stop reason: SDUPTHER

## 2021-12-06 NOTE — TELEPHONE ENCOUNTER
Last visit 08/09/2021 Virtual visit MD Alexia Islas  Next appointment 3 months (11/2021) - Nothing scheduled   Previous refill encounter(s)  06/15/2021 Lipitor #90 with 1 refill     Requested Prescriptions     Pending Prescriptions Disp Refills    atorvastatin (LIPITOR) 10 mg tablet 90 Tablet 0     Sig: Take 1 Tablet by mouth daily.

## 2021-12-09 RX ORDER — WARFARIN 2.5 MG/1
TABLET ORAL
Qty: 180 TABLET | Refills: 1 | Status: SHIPPED | OUTPATIENT
Start: 2021-12-09

## 2021-12-09 NOTE — TELEPHONE ENCOUNTER
MD Marcelina Watson,    Updated rx and qty as patient noted taking differently on 8/31/21 if appropriate.   Thanks, Tonie    Last Visit: VV 8/9/21 MD Marcelina Watson  Next Appointment: Not scheduled  Previous Refill Encounter(s): 5/11/21 90 + 1    Requested Prescriptions     Pending Prescriptions Disp Refills    warfarin (Coumadin) 2.5 mg tablet 180 Tablet 1     Sig: Take 2 tabs everyday except 1 tab on Mondays

## 2022-01-18 DIAGNOSIS — F41.9 ANXIETY: ICD-10-CM

## 2022-01-18 RX ORDER — PAROXETINE HYDROCHLORIDE 20 MG/1
20 TABLET, FILM COATED ORAL 2 TIMES DAILY
Qty: 180 TABLET | Refills: 1 | Status: SHIPPED | OUTPATIENT
Start: 2022-01-18 | End: 2022-08-22 | Stop reason: SDUPTHER

## 2022-01-18 NOTE — TELEPHONE ENCOUNTER
Last Visit: VV 8/9/21 MD Suri Neumann  Next Appointment: Not scheduled  Previous Refill Encounter(s): 7/12/21 180 + 1    Requested Prescriptions     Pending Prescriptions Disp Refills    PARoxetine (PaxiL) 20 mg tablet 180 Tablet 1     Sig: Take 1 Tablet by mouth two (2) times a day.

## 2022-03-01 DIAGNOSIS — E79.0 ELEVATED URIC ACID IN BLOOD: ICD-10-CM

## 2022-03-01 RX ORDER — ALLOPURINOL 100 MG/1
50 TABLET ORAL DAILY
Qty: 45 TABLET | Refills: 1 | Status: SHIPPED | OUTPATIENT
Start: 2022-03-01 | End: 2022-09-08 | Stop reason: SDUPTHER

## 2022-03-01 RX ORDER — ATORVASTATIN CALCIUM 10 MG/1
10 TABLET, FILM COATED ORAL DAILY
Qty: 90 TABLET | Refills: 0 | Status: SHIPPED | OUTPATIENT
Start: 2022-03-01 | End: 2022-06-01 | Stop reason: SDUPTHER

## 2022-03-01 RX ORDER — SOTALOL HYDROCHLORIDE 80 MG/1
80 TABLET ORAL 2 TIMES DAILY
Qty: 180 TABLET | Refills: 1 | Status: SHIPPED | OUTPATIENT
Start: 2022-03-01 | End: 2022-09-08 | Stop reason: SDUPTHER

## 2022-03-01 NOTE — TELEPHONE ENCOUNTER
Last Visit: VV 8/9/21 MD Marisol Avendaño, lipid 11/2020  Next Appointment: 3/4/22 MD Marisol Avendaño  Previous Refill Encounter(s):   Allopurinol 8/31/21 45 + 1  Atorvastatin  12/6/21 90  Sotalol  8/31/21 180 + 1    Requested Prescriptions     Pending Prescriptions Disp Refills    allopurinoL (ZYLOPRIM) 100 mg tablet 45 Tablet 1     Sig: Take 0.5 Tablets by mouth daily.  atorvastatin (LIPITOR) 10 mg tablet 90 Tablet 0     Sig: Take 1 Tablet by mouth daily.  sotaloL (BETAPACE) 80 mg tablet 180 Tablet 1     Sig: Take 1 Tablet by mouth two (2) times a day.

## 2022-03-04 ENCOUNTER — OFFICE VISIT (OUTPATIENT)
Dept: FAMILY MEDICINE CLINIC | Age: 82
End: 2022-03-04
Payer: MEDICARE

## 2022-03-04 VITALS
SYSTOLIC BLOOD PRESSURE: 118 MMHG | RESPIRATION RATE: 16 BRPM | DIASTOLIC BLOOD PRESSURE: 66 MMHG | HEART RATE: 68 BPM | TEMPERATURE: 98.2 F | HEIGHT: 70 IN | OXYGEN SATURATION: 98 % | WEIGHT: 188 LBS | BODY MASS INDEX: 26.92 KG/M2

## 2022-03-04 DIAGNOSIS — Z79.01 ANTICOAGULATED ON COUMADIN: ICD-10-CM

## 2022-03-04 DIAGNOSIS — F41.9 ANXIETY: ICD-10-CM

## 2022-03-04 DIAGNOSIS — I82.5Z9 CHRONIC DEEP VEIN THROMBOSIS (DVT) OF DISTAL VEIN OF LOWER EXTREMITY, UNSPECIFIED LATERALITY (HCC): ICD-10-CM

## 2022-03-04 DIAGNOSIS — G47.33 OSA ON CPAP: ICD-10-CM

## 2022-03-04 DIAGNOSIS — Z12.5 SCREENING FOR MALIGNANT NEOPLASM OF PROSTATE: ICD-10-CM

## 2022-03-04 DIAGNOSIS — I48.0 PAROXYSMAL ATRIAL FIBRILLATION (HCC): ICD-10-CM

## 2022-03-04 DIAGNOSIS — D33.3 ACOUSTIC NEUROMA (HCC): ICD-10-CM

## 2022-03-04 DIAGNOSIS — Z99.89 OSA ON CPAP: ICD-10-CM

## 2022-03-04 DIAGNOSIS — E79.0 ELEVATED URIC ACID IN BLOOD: Primary | ICD-10-CM

## 2022-03-04 DIAGNOSIS — E11.9 TYPE 2 DIABETES MELLITUS WITHOUT COMPLICATION, WITHOUT LONG-TERM CURRENT USE OF INSULIN (HCC): ICD-10-CM

## 2022-03-04 DIAGNOSIS — I47.9 PAROXYSMAL TACHYCARDIA, UNSPECIFIED (HCC): ICD-10-CM

## 2022-03-04 PROCEDURE — G8427 DOCREV CUR MEDS BY ELIG CLIN: HCPCS | Performed by: FAMILY MEDICINE

## 2022-03-04 PROCEDURE — G8419 CALC BMI OUT NRM PARAM NOF/U: HCPCS | Performed by: FAMILY MEDICINE

## 2022-03-04 PROCEDURE — G8536 NO DOC ELDER MAL SCRN: HCPCS | Performed by: FAMILY MEDICINE

## 2022-03-04 PROCEDURE — 99213 OFFICE O/P EST LOW 20 MIN: CPT | Performed by: FAMILY MEDICINE

## 2022-03-04 PROCEDURE — G0463 HOSPITAL OUTPT CLINIC VISIT: HCPCS | Performed by: FAMILY MEDICINE

## 2022-03-04 PROCEDURE — 1101F PT FALLS ASSESS-DOCD LE1/YR: CPT | Performed by: FAMILY MEDICINE

## 2022-03-04 PROCEDURE — G8510 SCR DEP NEG, NO PLAN REQD: HCPCS | Performed by: FAMILY MEDICINE

## 2022-03-04 RX ORDER — BUSPIRONE HYDROCHLORIDE 5 MG/1
5 TABLET ORAL 2 TIMES DAILY
Qty: 180 TABLET | Refills: 1 | Status: SHIPPED | OUTPATIENT
Start: 2022-03-04 | End: 2022-10-25 | Stop reason: SDUPTHER

## 2022-03-04 NOTE — PROGRESS NOTES
Vilma Manzano  80 y.o. male  1940  [address]  [id number]     [address] [facility]       Encounter Date: 3/4/2022           Established Patient Visit Note: Teresa Blanco MD    Reason for Appointment:  Chief Complaint   Patient presents with    Follow-up         History of Present Illness:  History provided by patient    Vilma Manzano is a 80 y.o. male who presents to clinic today for:     · Parox Afib/aflutter: He reports that he is having his INR checked with Dr. Ortiz [facility]). He reports that he just recently completed holter monitor. · Voice Change:  He reports that he discussed this with his ENT, but did not have any further evaluation. · Chronic DVT: on coumadin with cardiology  · Precancers Lesions: he had biopsy with Nereyda's office ([facility] and planning folow up soon  · DM2: He reports FBG as 112 to 128. Doing well with metformin  · S/p Left Hip Arthroplasty: he has follow up with ortho in June. · ISABELLA on CPAP: he reports that this has been doing well  · Dyslipidemia: tolerating atorvastatin; last LDL 64.8 in Nov, 2020  · Acoustic Neuroma: he has visit with ENT scheduled for fall, 2022  · Anxiety: he repots that he has had a littl emore anxitey recntly         Review of Systems  Review of Systems   Constitutional: Negative for chills and fever. Respiratory: Negative for cough, shortness of breath and wheezing. Cardiovascular: Negative for chest pain and palpitations. Allergies: Sulfa (sulfonamide antibiotics)    Medications:     Current Outpatient Medications:     busPIRone (BUSPAR) 5 mg tablet, Take 1 Tablet by mouth two (2) times a day., Disp: 180 Tablet, Rfl: 1    allopurinoL (ZYLOPRIM) 100 mg tablet, Take 0.5 Tablets by mouth daily. , Disp: 45 Tablet, Rfl: 1    atorvastatin (LIPITOR) 10 mg tablet, Take 1 Tablet by mouth daily. , Disp: 90 Tablet, Rfl: 0    sotaloL (BETAPACE) 80 mg tablet, Take 1 Tablet by mouth two (2) times a day., Disp: 180 Tablet, Rfl: 1    PARoxetine (PaxiL) 20 mg tablet, Take 1 Tablet by mouth two (2) times a day., Disp: 180 Tablet, Rfl: 1    warfarin (Coumadin) 2.5 mg tablet, Use as directed, Disp: 180 Tablet, Rfl: 1    warfarin (COUMADIN) 1 mg tablet, 3.5 mg daily except M/W/F take 2.5 mg, Disp: 48 Tablet, Rfl: 1    metFORMIN ER (GLUCOPHAGE XR) 500 mg tablet, Take 1 Tablet by mouth three (3) times daily (with meals). , Disp: 270 Tablet, Rfl: 1    glucose blood VI test strips (blood glucose test) strip, Use to check fasting and evening blood sugar daily. Patient may use whichever product is covered by insurance., Disp: 180 Strip, Rfl: 0    lancets misc, Use to check fasting and evening blood sugar daily. Patient may use whichever product is covered by insurance., Disp: 1 Each, Rfl: 11    Blood-Glucose Meter monitoring kit, Use to check fasting and evening blood sugar daily. Patient may use whichever product is covered by insurance., Disp: 1 Kit, Rfl: 0    senna-docusate (PERICOLACE) 8.6-50 mg per tablet, Take 1 Tablet by mouth two (2) times daily as needed for Constipation. , Disp: 60 Tablet, Rfl: 0    acetaminophen (TYLENOL) 500 mg tablet, Take 1 Tablet by mouth every four (4) hours. , Disp: 100 Tablet, Rfl: 0    iron 18 mg tab, Take 1 mg by mouth., Disp: , Rfl:     Lactobacillus acidophilus (PROBIOTIC PO), Take 15 billion cell by mouth daily. , Disp: , Rfl:     digestive enzymes cap, Take 220 mg by mouth daily. , Disp: , Rfl:     OTHER,NON-FORMULARY,, Take 10 mg by mouth daily. HEMP EXTRACT GUMMIES, Disp: , Rfl:     b complex-folic acid 0.4 mg tablet, Take 1 Tab by mouth daily. , Disp: , Rfl:     Comp. Stocking,Knee,Regular,Lrg (Relief Knee Open Toe Stocking) misc, Use daily for lower extremity swelling, Size large 30-40mm, Model # [id number], Disp: 2 Each, Rfl: 1    ascorbic acid, vitamin C, (VITAMIN C) 500 mg tablet, Take  by mouth., Disp: , Rfl:     gluc case/chondro case A/vit C/Mn (GLUCOSAMINE 1500 COMPLEX PO), Take  by mouth., Disp: , Rfl:     folic acid (FOLVITE) 1 mg tablet, Take  by mouth daily. , Disp: , Rfl:     cholecalciferol (VITAMIN D3) (2,000 UNITS /50 MCG) cap capsule, Take 2,000 Units by mouth daily. , Disp: , Rfl:     magnesium 250 mg tab, Take  by mouth., Disp: , Rfl:     calcium carbonate (CALCIUM 500 PO), Take  by mouth., Disp: , Rfl:     turmeric (CURCUMIN), 1 Tablet by Does Not Apply route daily. , Disp: , Rfl:     History  Patient Care Team:  Jigar Sanchez MD as PCP - General (Family Medicine)  Jigar Sanchez MD as PCP - [address] [place] [address] Provider  Barrie Miles MD as Physician (Otolaryngology)  Marly Hebert MD as Physician (Cardiology)  Elva Obrien MD (Orthopedic Surgery)    Past Medical History: he has a past medical history of Acoustic neuroma [facility]) (02/18/2020), Basal cell adenocarcinoma, Chronic deep vein thrombosis (DVT) of lower extremity ([facility] [address] (2/14/2020), COVID-19 vaccine series completed, Hearing loss, right, Hypercholesteremia (2/14/2020), Melanoma in situ ([facility] [address] ISABELLA on CPAP (2/18/2020), Paroxysmal tachycardia, unspecified ([facility] [address] (2/14/2020), Polymyalgia rheumatica ([facility] [address] Psychiatric disorder, and Skin cancer. Past Surgical History: he has a past surgical history that includes hx colonoscopy; hx orthopaedic (Right); hx hernia repair; hx gi; hx endoscopy; hx wisdom teeth extraction; and hx tonsillectomy. Family Medical History: family history includes Heart Disease in his father; OSTEOARTHRITIS in his sister. Social History: he reports that he quit smoking about 41 years ago. His smoking use included pipe. He quit after 12.00 years of use. He has never used smokeless tobacco. He reports current alcohol use of about 21.0 standard drinks of alcohol per week. He reports that he does not use drugs.         Objective:   Visit Vitals  /66   Pulse 68   Temp 98.2 °F (36.8 °C)   Resp 16   Ht 5' 10\" (1.778 m)   Wt 188 lb (85.3 kg)   SpO2 98%   BMI 26.98 kg/m²     Wt Readings from Last 3 Encounters:   03/04/22 188 lb (85.3 kg)   12/01/21 185 lb (83.9 kg)   08/10/21 189 lb (85.7 kg)       Physical Exam  Constitutional:       Appearance: Normal appearance. HENT:      Head: Normocephalic and atraumatic. Right Ear: External ear normal.      Left Ear: External ear normal.      Nose: Nose normal.      Mouth/Throat:      Pharynx: No oropharyngeal exudate. Eyes:      General: No scleral icterus. Right eye: No discharge. Left eye: No discharge. Conjunctiva/sclera: Conjunctivae normal.      Pupils: Pupils are equal, round, and reactive to light. Neck:      Thyroid: No thyromegaly. Vascular: No JVD. Trachea: No tracheal deviation. Cardiovascular:      Rate and Rhythm: Normal rate and regular rhythm. Heart sounds: Normal heart sounds. No murmur heard. No friction rub. No gallop. Pulmonary:      Effort: Pulmonary effort is normal. No respiratory distress. Breath sounds: Normal breath sounds. No stridor. No wheezing. Musculoskeletal:         General: No tenderness or deformity. Normal range of motion. Cervical back: Normal range of motion and neck supple. Lymphadenopathy:      Cervical: No cervical adenopathy. Skin:     General: Skin is warm and dry. Neurological:      Mental Status: He is alert. Cranial Nerves: No cranial nerve deficit. Coordination: Coordination normal.      Gait: Gait is intact. Deep Tendon Reflexes: Reflexes normal.         Assessment & Plan:      ICD-10-CM ICD-9-CM    1. Elevated uric acid in blood  E79.0 790.6 URIC ACID   2. Type 2 diabetes mellitus without complication, without long-term current use of insulin (HCC)  E11.9 250.00 CBC W/O DIFF      LIPID PANEL      METABOLIC PANEL, COMPREHENSIVE      HEMOGLOBIN A1C WITH EAG      TSH 3RD GENERATION      MICROALBUMIN, UR, RAND W/ MICROALB/CREAT RATIO   3.  Chronic deep vein thrombosis (DVT) of distal vein of lower extremity, unspecified laterality (HCC)  I82.5Z9 453.52    4. Paroxysmal atrial fibrillation (HCC)  I48.0 427.31    5. Acoustic neuroma (HCC)  D33.3 225.1    6. Screening for malignant neoplasm of prostate  Z12.5 V76.44 PSA SCREENING (SCREENING)   7. Anxiety  F41.9 300.00 busPIRone (BUSPAR) 5 mg tablet   8. Paroxysmal tachycardia, unspecified (HCC)  I47.9 427.2    9. Anticoagulated on Coumadin  Z79.01 V58.61    10. ISABELLA on CPAP  G47.33 327.23     Z99.89 V46.8      · Anxiety: Chronic, uncontrolled. discussed medication options and associated risks/benefits/side effects/precautions; patient would like to try  Buspar. Chio Lopez  All other conditions listed above: Chronic, stable and/or managed by specialist. Will continue current treatment regimen. Will check labs as reflected above. Discussed following up with specialist as scheduled. Discussed recommendations for diet and exercise. I have discussed the diagnosis with the patient and the intended plan as seen in the above orders. The patient has received an after-visit summary along with patient information handout. I have discussed medication side effects and warnings with the patient as well.     Disposition  Follow-up and Dispositions    · Return in about 3 months (around 6/4/2022) for medicare wellness exam.           Meche Lawson MD

## 2022-03-04 NOTE — PROGRESS NOTES
Chief Complaint   Patient presents with    Follow-up        1. \"Have you been to the ER, urgent care clinic since your last visit? Hospitalized since your last visit? \" No    2. \"Have you seen or consulted any other health care providers outside of the [address] [place] [address] since your last visit? \" No     3. For patients aged 39-70: Has the patient had a colonoscopy? NA - based on age     If the patient is female:    4. For patients aged 41-77: Has the patient had a mammogram within the past 2 years? NA - based on age    11. For patients aged 21-30: Has the patient had a pap smear?  NA - based on age    1 most recent PHQ Screens 3/4/2022   Little interest or pleasure in doing things Not at all   Feeling down, depressed, irritable, or hopeless Not at all   Total Score PHQ 2 0

## 2022-03-14 DIAGNOSIS — E11.9 TYPE 2 DIABETES MELLITUS WITHOUT COMPLICATION, WITHOUT LONG-TERM CURRENT USE OF INSULIN (HCC): ICD-10-CM

## 2022-03-14 RX ORDER — METFORMIN HYDROCHLORIDE 500 MG/1
500 TABLET, EXTENDED RELEASE ORAL
Qty: 270 TABLET | Refills: 1 | Status: SHIPPED | OUTPATIENT
Start: 2022-03-14 | End: 2022-09-12 | Stop reason: SDUPTHER

## 2022-03-14 NOTE — TELEPHONE ENCOUNTER
Last visit:3/4/22  Next visit:not scheduled  Previous refill 8/5/21(270+1R)    Requested Prescriptions     Pending Prescriptions Disp Refills    metFORMIN ER (GLUCOPHAGE XR) 500 mg tablet 270 Tablet 1     Sig: Take 1 Tablet by mouth three (3) times daily (with meals).

## 2022-03-18 PROBLEM — Z85.820 HX OF MELANOMA EXCISION: Status: ACTIVE | Noted: 2020-08-16
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
## 2022-03-18 PROBLEM — Z98.890 HX OF MELANOMA EXCISION: Status: ACTIVE | Noted: 2020-08-16

## 2022-03-19 PROBLEM — D33.3 ACOUSTIC NEUROMA (HCC): Status: ACTIVE | Noted: 2020-02-18

## 2022-03-19 PROBLEM — G31.84 MILD COGNITIVE IMPAIRMENT: Status: ACTIVE | Noted: 2020-10-05

## 2022-03-19 PROBLEM — E11.9 TYPE 2 DIABETES MELLITUS WITHOUT COMPLICATION, WITHOUT LONG-TERM CURRENT USE OF INSULIN (HCC): Status: ACTIVE | Noted: 2021-06-25

## 2022-03-19 PROBLEM — Z79.01 ANTICOAGULATED ON COUMADIN: Status: ACTIVE | Noted: 2020-08-16

## 2022-03-19 PROBLEM — E78.00 HYPERCHOLESTEREMIA: Status: ACTIVE | Noted: 2020-02-14

## 2022-03-19 PROBLEM — F41.9 ANXIETY: Status: ACTIVE | Noted: 2020-02-14

## 2022-03-19 PROBLEM — Z96.642 STATUS POST LEFT HIP REPLACEMENT: Status: ACTIVE | Noted: 2021-08-15

## 2022-03-19 PROBLEM — R01.1 HEART MURMUR: Status: ACTIVE | Noted: 2020-02-18

## 2022-03-19 PROBLEM — I47.9 PAROXYSMAL TACHYCARDIA, UNSPECIFIED (HCC): Status: ACTIVE | Noted: 2020-02-14

## 2022-03-19 PROBLEM — M1A.9XX0 CHRONIC GOUT WITHOUT TOPHUS: Status: ACTIVE | Noted: 2020-08-16

## 2022-03-19 PROBLEM — I82.509 CHRONIC DEEP VEIN THROMBOSIS (DVT) OF LOWER EXTREMITY (HCC): Status: ACTIVE | Noted: 2020-02-14

## 2022-03-20 PROBLEM — M16.12 PRIMARY LOCALIZED OSTEOARTHRITIS OF LEFT HIP: Status: ACTIVE | Noted: 2021-06-24

## 2022-03-20 PROBLEM — G47.33 OSA ON CPAP: Status: ACTIVE | Noted: 2020-02-18

## 2022-03-20 PROBLEM — Z99.89 OSA ON CPAP: Status: ACTIVE | Noted: 2020-02-18

## 2022-03-20 PROBLEM — I48.91 ATRIAL FIBRILLATION (HCC): Status: ACTIVE | Noted: 2020-08-16

## 2022-03-20 PROBLEM — Z79.01 LONG TERM (CURRENT) USE OF ANTICOAGULANTS: Status: ACTIVE | Noted: 2020-09-28

## 2022-03-20 PROBLEM — R06.09 CHRONIC DYSPNEA: Status: ACTIVE | Noted: 2020-02-18

## 2022-03-20 PROBLEM — M25.559 HIP PAIN: Status: ACTIVE | Noted: 2020-08-16

## 2022-03-21 ENCOUNTER — VIRTUAL VISIT (OUTPATIENT)
Dept: FAMILY MEDICINE CLINIC | Age: 82
End: 2022-03-21
Payer: MEDICARE

## 2022-03-21 DIAGNOSIS — J06.9 UPPER RESPIRATORY TRACT INFECTION, UNSPECIFIED TYPE: Primary | ICD-10-CM

## 2022-03-21 PROCEDURE — G8536 NO DOC ELDER MAL SCRN: HCPCS | Performed by: FAMILY MEDICINE

## 2022-03-21 PROCEDURE — G0463 HOSPITAL OUTPT CLINIC VISIT: HCPCS | Performed by: FAMILY MEDICINE

## 2022-03-21 PROCEDURE — 1101F PT FALLS ASSESS-DOCD LE1/YR: CPT | Performed by: FAMILY MEDICINE

## 2022-03-21 PROCEDURE — G8419 CALC BMI OUT NRM PARAM NOF/U: HCPCS | Performed by: FAMILY MEDICINE

## 2022-03-21 PROCEDURE — G8427 DOCREV CUR MEDS BY ELIG CLIN: HCPCS | Performed by: FAMILY MEDICINE

## 2022-03-21 PROCEDURE — 99213 OFFICE O/P EST LOW 20 MIN: CPT | Performed by: FAMILY MEDICINE

## 2022-03-21 PROCEDURE — G8432 DEP SCR NOT DOC, RNG: HCPCS | Performed by: FAMILY MEDICINE

## 2022-03-21 RX ORDER — AMOXICILLIN AND CLAVULANATE POTASSIUM 875; 125 MG/1; MG/1
1 TABLET, FILM COATED ORAL EVERY 12 HOURS
Qty: 14 TABLET | Refills: 0 | Status: SHIPPED | OUTPATIENT
Start: 2022-03-21 | End: 2022-03-28

## 2022-03-21 NOTE — PROGRESS NOTES
Camille Cardenas  80 y.o. male  1940  [place] [address]  [id number]     [facility]       Encounter Date: 3/21/2022           Established Patient Visit Note: Ashley Harris MD    Reason for Appointment:  Chief Complaint   Patient presents with    URI       History of Present Illness:  History provided by patient    Camille Cardenas is a 80 y.o. male who presents today for:     URI  Duration: symptoms started Wednesday of last week  He reports that he went to an Event with 200 people. He reports being tested for COVID two times, which was negative  Symptoms: scratchy throat, cough productive of yellow sputum, nasal congestion  Course: he reports that it feels like the infection is working its way down. Treatment: Flonase, cough syrup, guanfacine,         Review of Systems  See HPI      Allergies: Sulfa (sulfonamide antibiotics)    Medications:     Current Outpatient Medications:     amoxicillin-clavulanate (AUGMENTIN) 875-125 mg per tablet, Take 1 Tablet by mouth every twelve (12) hours for 7 days. , Disp: 14 Tablet, Rfl: 0    benzonatate (TESSALON) 200 mg capsule, Take 1 Capsule by mouth three (3) times daily as needed for Cough for up to 7 days. , Disp: 15 Capsule, Rfl: 0    warfarin (COUMADIN) 1 mg tablet, 3.5 mg daily except M/W/F take 2.5 mg, Disp: 48 Tablet, Rfl: 1    metFORMIN ER (GLUCOPHAGE XR) 500 mg tablet, Take 1 Tablet by mouth three (3) times daily (with meals). , Disp: 270 Tablet, Rfl: 1    busPIRone (BUSPAR) 5 mg tablet, Take 1 Tablet by mouth two (2) times a day., Disp: 180 Tablet, Rfl: 1    allopurinoL (ZYLOPRIM) 100 mg tablet, Take 0.5 Tablets by mouth daily. , Disp: 45 Tablet, Rfl: 1    atorvastatin (LIPITOR) 10 mg tablet, Take 1 Tablet by mouth daily. , Disp: 90 Tablet, Rfl: 0    sotaloL (BETAPACE) 80 mg tablet, Take 1 Tablet by mouth two (2) times a day., Disp: 180 Tablet, Rfl: 1    PARoxetine (PaxiL) 20 mg tablet, Take 1 Tablet by mouth two (2) times a day., Disp: 180 Tablet, Rfl: 1    warfarin (Coumadin) 2.5 mg tablet, Use as directed, Disp: 180 Tablet, Rfl: 1    glucose blood VI test strips (blood glucose test) strip, Use to check fasting and evening blood sugar daily. Patient may use whichever product is covered by insurance., Disp: 180 Strip, Rfl: 0    lancets misc, Use to check fasting and evening blood sugar daily. Patient may use whichever product is covered by insurance., Disp: 1 Each, Rfl: 11    Blood-Glucose Meter monitoring kit, Use to check fasting and evening blood sugar daily. Patient may use whichever product is covered by insurance., Disp: 1 Kit, Rfl: 0    senna-docusate (PERICOLACE) 8.6-50 mg per tablet, Take 1 Tablet by mouth two (2) times daily as needed for Constipation. , Disp: 60 Tablet, Rfl: 0    acetaminophen (TYLENOL) 500 mg tablet, Take 1 Tablet by mouth every four (4) hours. , Disp: 100 Tablet, Rfl: 0    iron 18 mg tab, Take 1 mg by mouth., Disp: , Rfl:     Lactobacillus acidophilus (PROBIOTIC PO), Take 15 billion cell by mouth daily. , Disp: , Rfl:     digestive enzymes cap, Take 220 mg by mouth daily. , Disp: , Rfl:     OTHER,NON-FORMULARY,, Take 10 mg by mouth daily. HEMP EXTRACT GUMMIES, Disp: , Rfl:     b complex-folic acid 0.4 mg tablet, Take 1 Tab by mouth daily. , Disp: , Rfl:     Comp. Stocking,Knee,Regular,Lrg (Relief Knee Open Toe Stocking) misc, Use daily for lower extremity swelling, Size large 30-40mm, Model # [id number], Disp: 2 Each, Rfl: 1    ascorbic acid, vitamin C, (VITAMIN C) 500 mg tablet, Take  by mouth., Disp: , Rfl:     gluc case/chondro case A/vit C/Mn (GLUCOSAMINE 1500 COMPLEX PO), Take  by mouth., Disp: , Rfl:     folic acid (FOLVITE) 1 mg tablet, Take  by mouth daily. , Disp: , Rfl:     cholecalciferol (VITAMIN D3) (2,000 UNITS /50 MCG) cap capsule, Take 2,000 Units by mouth daily. , Disp: , Rfl:     magnesium 250 mg tab, Take  by mouth., Disp: , Rfl:     calcium carbonate (CALCIUM 500 PO), Take  by mouth., Disp: , Rfl:     turmeric (CURCUMIN), 1 Tablet by Does Not Apply route daily. , Disp: , Rfl:     History  Patient Care Team:  Zeus Oswald MD as PCP - General (Family Medicine)  Zeus Oswald MD as PCP - [facility]  Rhea Hernandez MD as Physician (Otolaryngology)  Leonarda Saleh MD as Physician (Cardiology)  You Brown MD (Orthopedic Surgery)    Past Medical History: he has a past medical history of Acoustic neuroma [facility]) (02/18/2020), Basal cell adenocarcinoma, Chronic deep vein thrombosis (DVT) of lower extremity ([facility] [address] (2/14/2020), COVID-19 vaccine series completed, Hearing loss, right, Hypercholesteremia (2/14/2020), Melanoma in situ ([facility] [address] ISABELLA on CPAP (2/18/2020), Paroxysmal tachycardia, unspecified ([facility] [address] (2/14/2020), Polymyalgia rheumatica ([facility] [address] Psychiatric disorder, and Skin cancer. Past Surgical History: he has a past surgical history that includes hx colonoscopy; hx orthopaedic (Right); hx hernia repair; hx gi; hx endoscopy; hx wisdom teeth extraction; and hx tonsillectomy. Family Medical History: family history includes Heart Disease in his father; OSTEOARTHRITIS in his sister. Social History: he reports that he quit smoking about 41 years ago. His smoking use included pipe. He quit after 12.00 years of use. He has never used smokeless tobacco. He reports current alcohol use of about 21.0 standard drinks of alcohol per week. He reports that he does not use drugs. Objective:     Physical Exam    Constitutional:       General: Not in acute distress. Appearance: Normal appearance. Not ill-appearing,  Not toxic-appearing. HENT:      Head: Normocephalic. Right Ear: External ear normal.      Left Ear: External ear normal.      Nose: Nose normal.   Eyes:      General: No scleral icterus. Right eye: No discharge. Left eye: No discharge. Extraocular Movements: Extraocular movements intact. Conjunctiva/sclera: Conjunctivae normal.   Neck:      Musculoskeletal: Normal range of motion. Pulmonary:      Effort: Pulmonary effort is normal. No respiratory distress. Neurological:      Mental Status: Mental status is at baseline. Psychiatric:         Mood and Affect: Mood normal.         Behavior: Behavior normal.         Thought Content: Thought content normal.         Judgment: Judgment normal.       Assessment & Plan:      ICD-10-CM ICD-9-CM    1. Upper respiratory tract infection, unspecified type  J06.9 465.9 amoxicillin-clavulanate (AUGMENTIN) 875-125 mg per tablet     Acute, uncontrolled. Treat as above. Discussed red flag symptoms and reasons to call or go to ED  . I was in the office while conducting this encounter. Consent:  He and/or his healthcare decision maker is aware that this patient-initiated Telehealth encounter is a billable service, with coverage as determined by his insurance carrier. He is aware that he may receive a bill and has provided verbal consent to proceed: Yes    This virtual visit was conducted via [address] Pursuant to the emergency declaration under the [facility], [facility] waiver authority and the [employer] Act, this Virtual  Visit was conducted to reduce the patient's risk of exposure to COVID-19 and provide continuity of care for an established patient. Services were provided through a video synchronous discussion virtually to substitute for in-person clinic visit. Due to this being a TeleHealth evaluation, many elements of the physical examination are unable to be assessed. Total Time: minutes: 21-30 minutes. I have discussed the diagnosis with the patient and the intended plan as seen in the above orders. The patient has received an after-visit summary along with patient information handout.   I have discussed medication side effects and warnings with the patient as well. Disposition  Follow-up and Dispositions    · Return if symptoms worsen or fail to improve, for as scheduled.            Richar Gómez MD

## 2022-03-22 RX ORDER — WARFARIN 1 MG/1
TABLET ORAL
Qty: 48 TABLET | Refills: 1 | Status: SHIPPED | OUTPATIENT
Start: 2022-03-22 | End: 2022-08-17 | Stop reason: SDUPTHER

## 2022-03-22 NOTE — TELEPHONE ENCOUNTER
Last Visit: VV 3/21/22 MD Chel Louis  Next Appointment: Not scheduled  Previous Refill Encounter(s): 11/11/21 48 + 1    Requested Prescriptions     Pending Prescriptions Disp Refills    warfarin (COUMADIN) 1 mg tablet 48 Tablet 1     Sig: 3.5 mg daily except M/W/F take 2.5 mg

## 2022-03-25 DIAGNOSIS — J06.9 UPPER RESPIRATORY TRACT INFECTION, UNSPECIFIED TYPE: Primary | ICD-10-CM

## 2022-03-25 RX ORDER — BENZONATATE 200 MG/1
200 CAPSULE ORAL
Qty: 15 CAPSULE | Refills: 0 | Status: SHIPPED | OUTPATIENT
Start: 2022-03-25 | End: 2022-04-01

## 2022-04-07 ENCOUNTER — TELEPHONE (OUTPATIENT)
Dept: FAMILY MEDICINE CLINIC | Age: 82
End: 2022-04-07

## 2022-05-02 ENCOUNTER — OFFICE VISIT (OUTPATIENT)
Dept: ORTHOPEDIC SURGERY | Age: 82
End: 2022-05-02
Payer: MEDICARE

## 2022-05-02 VITALS — BODY MASS INDEX: 25.77 KG/M2 | WEIGHT: 180 LBS | HEIGHT: 70 IN

## 2022-05-02 DIAGNOSIS — M10.9 ACUTE GOUT OF RIGHT KNEE, UNSPECIFIED CAUSE: ICD-10-CM

## 2022-05-02 DIAGNOSIS — M25.461 EFFUSION OF RIGHT KNEE: ICD-10-CM

## 2022-05-02 DIAGNOSIS — M17.11 PRIMARY OSTEOARTHRITIS OF RIGHT KNEE: Primary | ICD-10-CM

## 2022-05-02 DIAGNOSIS — M25.561 RIGHT KNEE PAIN, UNSPECIFIED CHRONICITY: ICD-10-CM

## 2022-05-02 PROCEDURE — 99213 OFFICE O/P EST LOW 20 MIN: CPT | Performed by: ORTHOPAEDIC SURGERY

## 2022-05-02 PROCEDURE — G8432 DEP SCR NOT DOC, RNG: HCPCS | Performed by: ORTHOPAEDIC SURGERY

## 2022-05-02 PROCEDURE — G8536 NO DOC ELDER MAL SCRN: HCPCS | Performed by: ORTHOPAEDIC SURGERY

## 2022-05-02 PROCEDURE — 20610 DRAIN/INJ JOINT/BURSA W/O US: CPT | Performed by: ORTHOPAEDIC SURGERY

## 2022-05-02 PROCEDURE — 1101F PT FALLS ASSESS-DOCD LE1/YR: CPT | Performed by: ORTHOPAEDIC SURGERY

## 2022-05-02 PROCEDURE — G8419 CALC BMI OUT NRM PARAM NOF/U: HCPCS | Performed by: ORTHOPAEDIC SURGERY

## 2022-05-02 PROCEDURE — G8427 DOCREV CUR MEDS BY ELIG CLIN: HCPCS | Performed by: ORTHOPAEDIC SURGERY

## 2022-05-02 RX ORDER — BUPIVACAINE HYDROCHLORIDE 5 MG/ML
5 INJECTION, SOLUTION PERINEURAL ONCE
Status: COMPLETED | OUTPATIENT
Start: 2022-05-02 | End: 2022-05-02

## 2022-05-02 RX ORDER — METHYLPREDNISOLONE ACETATE 80 MG/ML
40 INJECTION, SUSPENSION INTRA-ARTICULAR; INTRALESIONAL; INTRAMUSCULAR; SOFT TISSUE ONCE
Status: COMPLETED | OUTPATIENT
Start: 2022-05-02 | End: 2022-05-02

## 2022-05-02 RX ORDER — BUPIVACAINE HYDROCHLORIDE 5 MG/ML
2 INJECTION, SOLUTION PERINEURAL ONCE
Status: COMPLETED | OUTPATIENT
Start: 2022-05-02 | End: 2022-05-02

## 2022-05-02 RX ADMIN — BUPIVACAINE HYDROCHLORIDE 10 MG: 5 INJECTION, SOLUTION PERINEURAL at 16:17

## 2022-05-02 RX ADMIN — METHYLPREDNISOLONE ACETATE 40 MG: 80 INJECTION, SUSPENSION INTRA-ARTICULAR; INTRALESIONAL; INTRAMUSCULAR; SOFT TISSUE at 16:18

## 2022-05-02 RX ADMIN — BUPIVACAINE HYDROCHLORIDE 25 MG: 5 INJECTION, SOLUTION PERINEURAL at 16:17

## 2022-05-02 NOTE — LETTER
5/2/2022    Patient: Cristina Otriz   YOB: 1940   Date of Visit: 5/2/2022     Sivakumar Carlson MD  [address] [place] [address]  Via In Basket    Dear Sivakumar Carlson MD,      Thank you for referring Mr. Cristina Ortiz to [facility] for evaluation. My notes for this consultation are attached. If you have questions, please do not hesitate to call me. I look forward to following your patient along with you.       Sincerely,    Mario Gay MD

## 2022-05-02 NOTE — PROGRESS NOTES
Debbie Osorio Jump (: 1940) is a 80 y.o. male, patient, here for evaluation of the following chief complaint(s):  Knee Pain (right knee)       SUBJECTIVE/OBJECTIVE:  Cheryl James presents today complaining of new right knee pain. He fell last week and landed on the right knee. Pain and swelling associated, especially posteriorly. Pain got worse since the injury through last night. Difficulty falling asleep. Ice has helped. Takes coumadin, cannot take NSAIDs. 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 Remote history of bilateral knee scopes. Sees Dr. Hoang Justice for balance issues, at [facility] for PT.       PHYSICAL EXAM:  Vitals: Ht 5' 10\" (1.778 m)   Wt 180 lb (81.6 kg)   BMI 25.83 kg/m²   Body mass index is 25.83 kg/m². 80y.o. year old M in no acute distress. Ambulates with a slight limp. Negative SLR, no nerve tension signs. Negative Stinchfield. Tense effusion right knee. Skin otherwise warm, dry. TTP posteriorly and along the lateral patellar facet. ROM 0-105/110, limited by effusion. Motor 5/5 distally. No distal edema. Symmetrical palpable distal pulses. IMAGING:  Radiographs: XR Results (most recent):  Results from Appointment encounter on 22    XR KNEE RT MIN 4 V    Narrative  4 x-ray views of right knee including AP PA flexion, lateral, sunrise demonstrate moderate loss of medial compartment joint space with medial and lateral marginal osteophyte formation. Complete loss of lateral patellofemoral joint space. Significant vascular calcification evident. ASSESSMENT/PLAN:  1.  Primary osteoarthritis of right knee  -     bupivacaine HCl (MARCAINE) 0.5 % (5 mg/mL) injection 10 mg; 10 mg (2 mL), SubCUTAneous, ONCE, 1 dose, On 22 at 1600  -     bupivacaine HCl (MARCAINE) 0.5 % (5 mg/mL) injection 25 mg; 25 mg (5 mL), Other, ONCE, 1 dose, On 22 at 1600  -     methylPREDNISolone acetate (DEPO-MEDROL) 80 mg/mL injection 40 mg; 40 mg, Intra artICUlar, ONCE, 1 dose, On Mon 5/2/22 at 1600  -     CULTURE, BODY FLUID W GRAM STAIN; Future  -     CELL COUNT, SYNOVIAL; Future  -     CRYSTALS, SYNOVIAL FLUID; Future  2. Effusion of right knee  -     CULTURE, BODY FLUID W GRAM STAIN; Future  -     CELL COUNT, SYNOVIAL; Future  -     CRYSTALS, SYNOVIAL FLUID; Future  3. Right knee pain, unspecified chronicity  -     XR KNEE RT MIN 4 V; Future  4. Acute gout of right knee, unspecified cause    The xray and exam findings were discussed with the patient today. Severe OA right knee exacerbated by fall, complicated by warfarin use. NSAID treatment limited by warfarin use. Discussed oral steroid treatment vs aspiration and injection. He elects for aspiration and injection today. Discussed risks, verbal consent obtained. Under sterile conditions, the knee as anesthetized with 2cc 0.5% bupivacaine with a 22g needle. Knee was then aspirated using an 18g needle, 85 cc of fluid obtained. Gross appearance of fluid is bright greenish color, opaque. Consistent with his history of gout. Through the same needle, the knee was then injected with 5cc 0.5% Bupivacaine and 40mg Depo Medrol intra-articularly, tolerated the procedure well. Fluid was sent for crystal analysis. Patient will continue with quad strengthening program.  Discussed natural history of disease process with probable need for knee replacement at some point in the future. Return as needed. No follow-ups on file. Review Of Systems  ROS     Positive for: Musculoskeletal    Last edited by Ray Oneill RN on 5/2/2022  3:15 PM. (History)         Patient denies any recent fever, chills, nausea, vomiting, chest pain, or shortness of breath.     Allergies   Allergen Reactions    Sulfa (Sulfonamide Antibiotics) Rash       Current Outpatient Medications   Medication Sig    warfarin (COUMADIN) 1 mg tablet 3.5 mg daily except M/W/F take 2.5 mg    metFORMIN ER (GLUCOPHAGE XR) 500 mg tablet Take 1 Tablet by mouth three (3) times daily (with meals).  busPIRone (BUSPAR) 5 mg tablet Take 1 Tablet by mouth two (2) times a day.  allopurinoL (ZYLOPRIM) 100 mg tablet Take 0.5 Tablets by mouth daily.  atorvastatin (LIPITOR) 10 mg tablet Take 1 Tablet by mouth daily.  sotaloL (BETAPACE) 80 mg tablet Take 1 Tablet by mouth two (2) times a day.  PARoxetine (PaxiL) 20 mg tablet Take 1 Tablet by mouth two (2) times a day.  warfarin (Coumadin) 2.5 mg tablet Use as directed    glucose blood VI test strips (blood glucose test) strip Use to check fasting and evening blood sugar daily. Patient may use whichever product is covered by insurance.  lancets misc Use to check fasting and evening blood sugar daily. Patient may use whichever product is covered by insurance.  Blood-Glucose Meter monitoring kit Use to check fasting and evening blood sugar daily. Patient may use whichever product is covered by insurance.  senna-docusate (PERICOLACE) 8.6-50 mg per tablet Take 1 Tablet by mouth two (2) times daily as needed for Constipation.  acetaminophen (TYLENOL) 500 mg tablet Take 1 Tablet by mouth every four (4) hours.  iron 18 mg tab Take 1 mg by mouth.  Lactobacillus acidophilus (PROBIOTIC PO) Take 15 billion cell by mouth daily.  digestive enzymes cap Take 220 mg by mouth daily.  OTHER,NON-FORMULARY, Take 10 mg by mouth daily. HEMP EXTRACT GUMMIES    b complex-folic acid 0.4 mg tablet Take 1 Tab by mouth daily.  Comp. Stocking,Knee,Regular,Lrg (Relief Knee Open Toe Stocking) misc Use daily for lower extremity swelling, Size large 30-40mm, Model # [id number]    ascorbic acid, vitamin C, (VITAMIN C) 500 mg tablet Take  by mouth.  gluc case/chondro case A/vit C/Mn (GLUCOSAMINE 1500 COMPLEX PO) Take  by mouth.  folic acid (FOLVITE) 1 mg tablet Take  by mouth daily.     cholecalciferol (VITAMIN D3) (2,000 UNITS /50 MCG) cap capsule Take 2,000 Units by mouth daily.  magnesium 250 mg tab Take  by mouth.  calcium carbonate (CALCIUM 500 PO) Take  by mouth.  turmeric (CURCUMIN) 1 Tablet by Does Not Apply route daily. No current facility-administered medications for this visit. Past Medical History:   Diagnosis Date    Acoustic neuroma ([facility] [address] 2020    Basal cell adenocarcinoma     Chronic deep vein thrombosis (DVT) of lower extremity (HCC) 2020    1.8 to 2.5 is goal    COVID-19 vaccine series completed     MODERNA 21 & 21    Hearing loss, right     Hypercholesteremia 2020    Lipitor 20mg    Melanoma in situ ([facility] [address]     TOP OF HEAD AND ON OUTSIDE OF LEFT KNEE    ISABELLA on CPAP 2020    Paroxysmal tachycardia, unspecified ([facility] [address] 2020    Sotalol Saw Bret Jimenez MD     Polymyalgia rheumatica [facility])     Psychiatric disorder     MILD ANXIETY     Skin cancer     SQUAMUS CELL         Past Surgical History:   Procedure Laterality Date    HX COLONOSCOPY      hx of GI bleed    HX ENDOSCOPY      HX GI      EXCISION IN COLONON     HX HERNIA REPAIR      X3    HX ORTHOPAEDIC Right     SHOULDER     HX TONSILLECTOMY      HX WISDOM TEETH EXTRACTION         Family History   Problem Relation Age of Onset    Heart Disease Father         [place]   Annetta Molina OSTEOARTHRITIS Sister     Anesth Problems Neg Hx         Social History     Socioeconomic History    Marital status:      Spouse name: Not on file    Number of children: Not on file    Years of education: Not on file    Highest education level: Not on file   Occupational History    Not on file   Tobacco Use    Smoking status: Former Smoker     Years: 12.00     Types: Pipe     Quit date: 1/15/1981     Years since quittin.3    Smokeless tobacco: Never Used    Tobacco comment: PIPE   Vaping Use    Vaping Use: Never used   Substance and Sexual Activity    Alcohol use:  Yes     Alcohol/week: 21.0 standard drinks     Types: 7 Glasses of wine, 7 Cans of beer, 7 Shots of liquor per week     Comment: HAS ONE OR THE OTHER ALTERNATES A DIFFERENT ON EACH DAY     Drug use: Never    Sexual activity: Yes   Other Topics Concern    Not on file   Social History Narrative    Not on file     Social Determinants of Health     Financial Resource Strain:     Difficulty of Paying Living Expenses: Not on file   Food Insecurity:     Worried About Running Out of Food in the Last Year: Not on file    Gianfranco of Food in the Last Year: Not on file   Transportation Needs:     Lack of Transportation (Medical): Not on file    Lack of Transportation (Non-Medical):  Not on file   Physical Activity:     Days of Exercise per Week: Not on file    Minutes of Exercise per Session: Not on file   Stress:     Feeling of Stress : Not on file   Social Connections:     Frequency of Communication with Friends and Family: Not on file    Frequency of Social Gatherings with Friends and Family: Not on file    Attends Jewish Services: Not on file    Active Member of [address] [place] [address] or Organizations: Not on file    Attends Club or Organization Meetings: Not on file    Marital Status: Not on file   Intimate Partner Violence:     Fear of Current or Ex-Partner: Not on file    Emotionally Abused: Not on file    Physically Abused: Not on file    Sexually Abused: Not on file   Housing Stability:     Unable to Pay for Housing in the Last Year: Not on file    Number of [place] in the Last Year: Not on file    Unstable Housing in the Last Year: Not on file       Orders Placed This Encounter    CULTURE, BODY FLUID W GRAM STAIN     Right knee     Standing Status:   Future     Standing Expiration Date:   5/2/2023     Order Specific Question:   Tube Number:     Answer:   1    XR KNEE RT MIN 4 V     Standing Status:   Future     Number of Occurrences:   1     Standing Expiration Date:   5/3/2023    CELL COUNT, SYNOVIAL     Right knee     Standing Status:   Future     Standing Expiration Date:   5/2/2023    CRYSTALS, SYNOVIAL FLUID     Standing Status:   Future     Standing Expiration Date:   5/2/2023     Order Specific Question:   Fluid type: Answer:   Knee, right [708]    bupivacaine HCl (MARCAINE) 0.5 % (5 mg/mL) injection 10 mg    bupivacaine HCl (MARCAINE) 0.5 % (5 mg/mL) injection 25 mg    methylPREDNISolone acetate (DEPO-MEDROL) 80 mg/mL injection 40 mg      Jg Thompson M.D.

## 2022-05-03 LAB
APPEARANCE SNV: ABNORMAL
BODY FLD TYPE: NORMAL
COLOR SNV: YELLOW
CRYSTALS FLD MICRO: NORMAL
LYMPHOCYTES NFR SNV MANUAL: 6 % (ref 0–15)
MONOCYTES NFR SNV MANUAL: 8 % (ref 0–65)
NEUTROPHILS NFR SNV MANUAL: 86 % (ref 0–20)
RBC # SNV: >100 /CU MM
SPECIMEN SOURCE FLD: ABNORMAL
WBC # SNV: ABNORMAL /CU MM (ref 0–150)

## 2022-05-17 LAB
BACTERIA SPEC CULT: NORMAL
GRAM STN SPEC: NORMAL
GRAM STN SPEC: NORMAL
SERVICE CMNT-IMP: NORMAL
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
## 2022-05-30 RX ORDER — DOXYCYCLINE 100 MG/1
100 TABLET ORAL 2 TIMES DAILY
Qty: 20 TABLET | Refills: 0 | Status: SHIPPED | OUTPATIENT
Start: 2022-05-30 | End: 2022-06-09

## 2022-05-30 RX ORDER — DOXYCYCLINE 100 MG/1
100 TABLET ORAL 2 TIMES DAILY
Qty: 20 TABLET | Refills: 0 | Status: SHIPPED | OUTPATIENT
Start: 2022-05-30 | End: 2022-05-30 | Stop reason: SDUPTHER

## 2022-05-30 NOTE — PROGRESS NOTES
Received message that patient had tick bite and red ring on skin after removal. Will provide course of doxycycline.      Becky Schmitz MD

## 2022-06-01 RX ORDER — ATORVASTATIN CALCIUM 10 MG/1
10 TABLET, FILM COATED ORAL DAILY
Qty: 30 TABLET | Refills: 0 | Status: SHIPPED | OUTPATIENT
Start: 2022-06-01 | End: 2022-07-13 | Stop reason: SDUPTHER

## 2022-06-01 NOTE — TELEPHONE ENCOUNTER
MD Emely Diehl,    Patient requesting refill atorvastatin. Labs have not yet been completed. Last lipid 11/11/2020. Thanks, Shaggy Morris    Last Visit: 3/4/22, VV 3/21/22 MD Emely Diehl  Next Appointment: None  Previous Refill Encounter(s): 3/1/22 90 (with request for OV and labs)    Requested Prescriptions     Pending Prescriptions Disp Refills    atorvastatin (LIPITOR) 10 mg tablet 30 Tablet 0     Sig: Take 1 Tablet by mouth daily. Labs due for refill.        For Pharmacy Admin Tracking Only    Intervention Detail: New Rx: 1, reason: Patient Preference   Time Spent (min): 1

## 2022-06-30 ENCOUNTER — OFFICE VISIT (OUTPATIENT)
Dept: ORTHOPEDIC SURGERY | Age: 82
End: 2022-06-30
Payer: MEDICARE

## 2022-06-30 VITALS — BODY MASS INDEX: 25.77 KG/M2 | WEIGHT: 180 LBS | HEIGHT: 70 IN

## 2022-06-30 DIAGNOSIS — Z96.642 STATUS POST LEFT HIP REPLACEMENT: Primary | ICD-10-CM

## 2022-06-30 PROCEDURE — G8432 DEP SCR NOT DOC, RNG: HCPCS | Performed by: ORTHOPAEDIC SURGERY

## 2022-06-30 PROCEDURE — G8427 DOCREV CUR MEDS BY ELIG CLIN: HCPCS | Performed by: ORTHOPAEDIC SURGERY

## 2022-06-30 PROCEDURE — 1123F ACP DISCUSS/DSCN MKR DOCD: CPT | Performed by: ORTHOPAEDIC SURGERY

## 2022-06-30 PROCEDURE — G8417 CALC BMI ABV UP PARAM F/U: HCPCS | Performed by: ORTHOPAEDIC SURGERY

## 2022-06-30 PROCEDURE — 99213 OFFICE O/P EST LOW 20 MIN: CPT | Performed by: ORTHOPAEDIC SURGERY

## 2022-06-30 PROCEDURE — G8536 NO DOC ELDER MAL SCRN: HCPCS | Performed by: ORTHOPAEDIC SURGERY

## 2022-06-30 PROCEDURE — 1101F PT FALLS ASSESS-DOCD LE1/YR: CPT | Performed by: ORTHOPAEDIC SURGERY

## 2022-06-30 NOTE — LETTER
6/30/2022    Patient: Maribell Mahan   YOB: 1940   Date of Visit: 6/30/2022     Rachelle Morfin, [address] [address]  Via In Basket    Dear Rachelle Morfin MD,      Thank you for referring Mr. Maribell Mahan to [facility] for evaluation. My notes for this consultation are attached. If you have questions, please do not hesitate to call me. I look forward to following your patient along with you.       Sincerely,    Ronnell Cheng MD

## 2022-06-30 NOTE — PROGRESS NOTES
Diana Mitchell (: 1940) is a 80 y.o. male, patient, here for evaluation of the following chief complaint(s):  Surgical Follow-up ([address] [place] [address] 1 year follow up)       SUBJECTIVE/OBJECTIVE:  Gallito Webster presents today for routine follow-up 1 year after left primary total hip replacement. Overall he is very happy with progress and function regarding the hip. He has no pain at all in the hip. No pain at rest, no pain at night, no pain with activities. Left hip does not limit anything he wants to do. PHYSICAL EXAM:  Vitals: Ht 5' 10\" (1.778 m)   Wt 180 lb (81.6 kg)   BMI 25.83 kg/m²   Body mass index is 25.83 kg/m². 80y.o. year old M, no distress. Ambulates without limp or Trendelenburg gait. Left lateral hip scar well-healed. No local tenderness. Negative Stinchfield left hip. Pain-free left hip range of motion which is well-preserved. Symmetrical palpable distal pulses. No gross motor or sensory deficits in lower extremities. No distal edema. IMAGING:  Radiographs: XR Results (most recent):  Results from Appointment encounter on 22    XR HIP LT W OR WO PELV 2-3 VWS    Narrative  3 x-ray views of left hip including AP pelvis, AP and frog lateral images demonstrate satisfactory position and alignment of cementless left total hip components with signs of ingrowth present. No evidence of loosening. ASSESSMENT/PLAN:  1. Status post left hip replacement  -     XR HIP LT W OR WO PELV 2-3 VWS; Future    The xray and exam findings were discussed with the patient today. Well-functioning left total hip, 1 year postop. Low impact activities as tolerated. Return in 4 years for routine 5-year postop x-ray of left hip, sooner if needed for his knees. Return for routine 1 year postop visit.               Review Of Systems  ROS     Negative for: Constitutional, Gastrointestinal, Neurological, Skin, Genitourinary, Musculoskeletal, HENT, Endocrine, Cardiovascular, Eyes, Respiratory, Psychiatric, Allergic/Imm, Heme/Lymph    Last edited by Paddy Scott RN on 6/30/2022  2:57 PM. (History)         Patient denies any recent fever, chills, nausea, vomiting, chest pain, or shortness of breath. Allergies   Allergen Reactions    Sulfa (Sulfonamide Antibiotics) Rash       Current Outpatient Medications   Medication Sig    atorvastatin (LIPITOR) 10 mg tablet Take 1 Tablet by mouth daily. Labs due for refill.  warfarin (COUMADIN) 1 mg tablet 3.5 mg daily except M/W/F take 2.5 mg    metFORMIN ER (GLUCOPHAGE XR) 500 mg tablet Take 1 Tablet by mouth three (3) times daily (with meals).  busPIRone (BUSPAR) 5 mg tablet Take 1 Tablet by mouth two (2) times a day.  allopurinoL (ZYLOPRIM) 100 mg tablet Take 0.5 Tablets by mouth daily.  sotaloL (BETAPACE) 80 mg tablet Take 1 Tablet by mouth two (2) times a day.  PARoxetine (PaxiL) 20 mg tablet Take 1 Tablet by mouth two (2) times a day.  warfarin (Coumadin) 2.5 mg tablet Use as directed    glucose blood VI test strips (blood glucose test) strip Use to check fasting and evening blood sugar daily. Patient may use whichever product is covered by insurance.  lancets misc Use to check fasting and evening blood sugar daily. Patient may use whichever product is covered by insurance.  Blood-Glucose Meter monitoring kit Use to check fasting and evening blood sugar daily. Patient may use whichever product is covered by insurance.  senna-docusate (PERICOLACE) 8.6-50 mg per tablet Take 1 Tablet by mouth two (2) times daily as needed for Constipation.  acetaminophen (TYLENOL) 500 mg tablet Take 1 Tablet by mouth every four (4) hours.  iron 18 mg tab Take 1 mg by mouth.  Lactobacillus acidophilus (PROBIOTIC PO) Take 15 billion cell by mouth daily.  digestive enzymes cap Take 220 mg by mouth daily.  OTHER,NON-FORMULARY, Take 10 mg by mouth daily.  HEMP EXTRACT GUMMIES    b complex-folic acid 0.4 mg tablet Take 1 Tab by mouth daily.  Comp. Stocking,Knee,Regular,Lrg (Relief Knee Open Toe Stocking) misc Use daily for lower extremity swelling, Size large 30-40mm, Model # [id number]    ascorbic acid, vitamin C, (VITAMIN C) 500 mg tablet Take  by mouth.  gluc case/chondro case A/vit C/Mn (GLUCOSAMINE 1500 COMPLEX PO) Take  by mouth.  folic acid (FOLVITE) 1 mg tablet Take  by mouth daily.  cholecalciferol (VITAMIN D3) (2,000 UNITS /50 MCG) cap capsule Take 2,000 Units by mouth daily.  magnesium 250 mg tab Take  by mouth.  calcium carbonate (CALCIUM 500 PO) Take  by mouth.  turmeric (CURCUMIN) 1 Tablet by Does Not Apply route daily. No current facility-administered medications for this visit.        Past Medical History:   Diagnosis Date    Acoustic neuroma ([facility] [address] 02/18/2020    Basal cell adenocarcinoma     Chronic deep vein thrombosis (DVT) of lower extremity (HCC) 2/14/2020    1.8 to 2.5 is goal    COVID-19 vaccine series completed     MODERNA 03/07/21 & 04/04/21    Hearing loss, right     Hypercholesteremia 2/14/2020    Lipitor 20mg    Melanoma in situ ([facility] [address]     TOP OF HEAD AND ON OUTSIDE OF LEFT KNEE    ISABELLA on CPAP 2/18/2020    Paroxysmal tachycardia, unspecified ([address] 2/14/2020    Sotalol Saw Clara Lyon MD     Polymyalgia rheumatica [facility])     Psychiatric disorder     MILD ANXIETY     Skin cancer     SQUAMUS CELL         Past Surgical History:   Procedure Laterality Date    HX COLONOSCOPY      hx of GI bleed    HX ENDOSCOPY      HX GI      EXCISION IN COLONON     HX HERNIA REPAIR      X3    HX ORTHOPAEDIC Right     SHOULDER     HX TONSILLECTOMY      HX WISDOM TEETH EXTRACTION         Family History   Problem Relation Age of Onset    Heart Disease Father         MI   Juan Donohue OSTEOARTHRITIS Sister     Anesth Problems Neg Hx         Social History     Socioeconomic History    Marital status:      Spouse name: Not on file    Number of children: Not on file    Years of education: Not on file    Highest education level: Not on file   Occupational History    Not on file   Tobacco Use    Smoking status: Former Smoker     Years: 12.00     Types: Pipe     Quit date: 1/15/1981     Years since quittin.4    Smokeless tobacco: Never Used    Tobacco comment: PIPE   Vaping Use    Vaping Use: Never used   Substance and Sexual Activity    Alcohol use: Yes     Alcohol/week: 21.0 standard drinks     Types: 7 Glasses of wine, 7 Cans of beer, 7 Shots of liquor per week     Comment: HAS ONE OR THE OTHER ALTERNATES A DIFFERENT ON EACH DAY     Drug use: Never    Sexual activity: Yes   Other Topics Concern    Not on file   Social History Narrative    Not on file     Social Determinants of Health     Financial Resource Strain:     Difficulty of Paying Living Expenses: Not on file   Food Insecurity:     Worried About Running Out of Food in the Last Year: Not on file    Gianfranco of Food in the Last Year: Not on file   Transportation Needs:     Lack of Transportation (Medical): Not on file    Lack of Transportation (Non-Medical):  Not on file   Physical Activity:     Days of Exercise per Week: Not on file    Minutes of Exercise per Session: Not on file   Stress:     Feeling of Stress : Not on file   Social Connections:     Frequency of Communication with Friends and Family: Not on file    Frequency of Social Gatherings with Friends and Family: Not on file    Attends Restorationist Services: Not on file    Active Member of [address] [place] [address] or Organizations: Not on file    Attends Club or Organization Meetings: Not on file    Marital Status: Not on file   Intimate Partner Violence:     Fear of Current or Ex-Partner: Not on file    Emotionally Abused: Not on file    Physically Abused: Not on file    Sexually Abused: Not on file   Housing Stability:     Unable to Pay for Housing in the Last Year: Not on file    Number of [place] in the Last Year: Not on file    Unstable Housing in the Last Year: Not on file       Orders Placed This Encounter    XR HIP LT W OR WO PELV 2-3 VWS     Standing Status:   Future     Number of Occurrences:   1     Standing Expiration Date:   7/1/2023        An electronic signature was used to authenticate this note.   -- Arben Ace MD

## 2022-07-13 DIAGNOSIS — E11.9 TYPE 2 DIABETES MELLITUS WITHOUT COMPLICATION, WITHOUT LONG-TERM CURRENT USE OF INSULIN (HCC): ICD-10-CM

## 2022-07-13 RX ORDER — LANCETS
EACH MISCELLANEOUS
Qty: 1 EACH | Refills: 11 | Status: SHIPPED | OUTPATIENT
Start: 2022-07-13

## 2022-07-13 RX ORDER — IBUPROFEN 200 MG
CAPSULE ORAL
Qty: 180 STRIP | Refills: 0 | Status: SHIPPED | OUTPATIENT
Start: 2022-07-13 | End: 2022-10-25 | Stop reason: SDUPTHER

## 2022-07-13 RX ORDER — ATORVASTATIN CALCIUM 10 MG/1
10 TABLET, FILM COATED ORAL DAILY
Qty: 90 TABLET | Refills: 0 | Status: SHIPPED | OUTPATIENT
Start: 2022-07-13 | End: 2022-10-11 | Stop reason: SDUPTHER

## 2022-07-13 NOTE — TELEPHONE ENCOUNTER
Last Visit: 3/21/22 with MD Gita Guerra  Next Appointment: none  Previous Refill Encounter(s): 6/1/22 Lipitor #30, 6/25/21 strips and lancets     Requested Prescriptions     Pending Prescriptions Disp Refills    atorvastatin (LIPITOR) 10 mg tablet 30 Tablet 0     Sig: Take 1 Tablet by mouth daily. Labs due for refill.  glucose blood VI test strips (blood glucose test) strip 180 Strip 0     Sig: Use to check fasting and evening blood sugar daily. Patient may use whichever product is covered by insurance.  lancets misc 1 Each 11     Sig: Use to check fasting and evening blood sugar daily. Patient may use whichever product is covered by insurance.        For [place] in place:    Recommendation Provided To:    Intervention Detail: New Rx: 3, reason: Patient Preference   Gap Closed?:    Intervention Accepted By:   Elian Time Spent (min): 5

## 2022-08-17 RX ORDER — WARFARIN 1 MG/1
TABLET ORAL
Qty: 60 TABLET | Refills: 1 | Status: SHIPPED | OUTPATIENT
Start: 2022-08-17

## 2022-08-17 NOTE — TELEPHONE ENCOUNTER
Chief Complaint   Patient presents with    Medication Refill     Warfarin sodium 1 mg. Patient seen on 03/21/22.

## 2022-08-22 DIAGNOSIS — F41.9 ANXIETY: ICD-10-CM

## 2022-08-22 RX ORDER — PAROXETINE HYDROCHLORIDE 20 MG/1
20 TABLET, FILM COATED ORAL 2 TIMES DAILY
Qty: 180 TABLET | Refills: 1 | Status: SHIPPED | OUTPATIENT
Start: 2022-08-22

## 2022-08-22 NOTE — TELEPHONE ENCOUNTER
Last Visit: VV 3/21/22 MD Devin Johnson 3/4/22  Next Appointment: None  Previous Refill Encounter(s): 1/18/22 180 + 1    Requested Prescriptions     Pending Prescriptions Disp Refills    PARoxetine (PaxiL) 20 mg tablet 180 Tablet 1     Sig: Take 1 Tablet by mouth two (2) times a day. For [facility] in place:   Recommendation Provided To:    Intervention Detail: New Rx: 1, reason: Patient Preference  Gap Closed?:   Intervention Accepted By:   Time Spent (min): 5

## 2022-08-23 ENCOUNTER — TELEPHONE (OUTPATIENT)
Dept: FAMILY MEDICINE CLINIC | Age: 82
End: 2022-08-23

## 2022-08-23 NOTE — TELEPHONE ENCOUNTER
This patient's labs have not yet resulted in the system. Could you please check with lab regarding this?     Anel Callahan MD

## 2022-08-31 ENCOUNTER — TELEPHONE (OUTPATIENT)
Dept: FAMILY MEDICINE CLINIC | Age: 82
End: 2022-08-31

## 2022-08-31 NOTE — TELEPHONE ENCOUNTER
Received lab results from 7/22/22, which have still not resulted in the system. Called patient and spoke with his wife. No urgent concerns. Will discuss further with patent this week.      Anel Callahan MD

## 2022-09-02 ENCOUNTER — TELEPHONE (OUTPATIENT)
Dept: FAMILY MEDICINE CLINIC | Age: 82
End: 2022-09-02

## 2022-09-02 DIAGNOSIS — E88.09 PROTEINS SERUM PLASMA LOW: Primary | ICD-10-CM

## 2022-09-02 NOTE — TELEPHONE ENCOUNTER
Called patient and discussed lab results. A1c was 6.5. PSA 3.4. Albumin a little low. Will obtain RUQ US.      Niels Verma MD

## 2022-09-08 DIAGNOSIS — E79.0 ELEVATED URIC ACID IN BLOOD: ICD-10-CM

## 2022-09-08 RX ORDER — SOTALOL HYDROCHLORIDE 80 MG/1
80 TABLET ORAL 2 TIMES DAILY
Qty: 180 TABLET | Refills: 1 | Status: SHIPPED | OUTPATIENT
Start: 2022-09-08

## 2022-09-08 RX ORDER — ALLOPURINOL 100 MG/1
50 TABLET ORAL DAILY
Qty: 45 TABLET | Refills: 1 | Status: SHIPPED | OUTPATIENT
Start: 2022-09-08

## 2022-09-08 NOTE — TELEPHONE ENCOUNTER
Last Visit: VV 3/21/22 MD Katz   Next Appointment: None  Previous Refill Encounter(s): 3/1/22 (both)  Allopurinol 45 + 1  Sotalol 180 + 1    Requested Prescriptions     Pending Prescriptions Disp Refills    allopurinoL (ZYLOPRIM) 100 mg tablet 45 Tablet 1     Sig: Take 0.5 Tablets by mouth daily. sotaloL (BETAPACE) 80 mg tablet 180 Tablet 1     Sig: Take 1 Tablet by mouth two (2) times a day. For [address] [facility] in place:   Recommendation Provided To:    Intervention Detail: New Rx: 2, reason: Patient Preference  Gap Closed?:   Intervention Accepted By:   Time Spent (min): 5

## 2022-09-12 DIAGNOSIS — E11.9 TYPE 2 DIABETES MELLITUS WITHOUT COMPLICATION, WITHOUT LONG-TERM CURRENT USE OF INSULIN (HCC): ICD-10-CM

## 2022-09-12 RX ORDER — METFORMIN HYDROCHLORIDE 500 MG/1
500 TABLET, EXTENDED RELEASE ORAL
Qty: 270 TABLET | Refills: 1 | Status: SHIPPED | OUTPATIENT
Start: 2022-09-12

## 2022-09-12 NOTE — TELEPHONE ENCOUNTER
Last Visit: VV 3/21/22 MD Kerri Goldberg  Next Appointment: None  Previous Refill Encounter(s): 3/14/22 270 + 1    Requested Prescriptions     Pending Prescriptions Disp Refills    metFORMIN ER (GLUCOPHAGE XR) 500 mg tablet 270 Tablet 1     Sig: Take 1 Tablet by mouth three (3) times daily (with meals). For [address] [facility] in place:   Recommendation Provided To:    Intervention Detail: New Rx: 1, reason: Patient Preference  Gap Closed?:   Intervention Accepted By:   Time Spent (min): 5

## 2022-09-23 ENCOUNTER — HOSPITAL ENCOUNTER (OUTPATIENT)
Dept: ULTRASOUND IMAGING | Age: 82
Discharge: HOME | End: 2022-09-23
Attending: FAMILY MEDICINE
Payer: MEDICARE

## 2022-09-23 DIAGNOSIS — E88.09 PROTEINS SERUM PLASMA LOW: ICD-10-CM

## 2022-09-23 PROCEDURE — 76705 ECHO EXAM OF ABDOMEN: CPT

## 2022-09-24 ENCOUNTER — APPOINTMENT (OUTPATIENT)
Dept: GENERAL RADIOLOGY | Age: 82
End: 2022-09-24
Attending: STUDENT IN AN ORGANIZED HEALTH CARE EDUCATION/TRAINING PROGRAM
Payer: MEDICARE

## 2022-09-24 ENCOUNTER — HOSPITAL ENCOUNTER (OUTPATIENT)
Age: 82
Setting detail: OBSERVATION
Discharge: HOME | End: 2022-09-25
Attending: STUDENT IN AN ORGANIZED HEALTH CARE EDUCATION/TRAINING PROGRAM | Admitting: INTERNAL MEDICINE
Payer: MEDICARE

## 2022-09-24 DIAGNOSIS — R00.1 BRADYCARDIA: ICD-10-CM

## 2022-09-24 DIAGNOSIS — I48.92 ATRIAL FLUTTER, UNSPECIFIED TYPE (HCC): Primary | ICD-10-CM

## 2022-09-24 DIAGNOSIS — R06.02 SOB (SHORTNESS OF BREATH): ICD-10-CM

## 2022-09-24 LAB
ALBUMIN SERPL-MCNC: 3.7 G/DL (ref 3.5–5)
ALBUMIN/GLOB SERPL: 1.2 {RATIO} (ref 1.1–2.2)
ALP SERPL-CCNC: 64 U/L (ref 45–117)
ALT SERPL-CCNC: 27 U/L (ref 12–78)
ANION GAP SERPL CALC-SCNC: 4 MMOL/L (ref 5–15)
APPEARANCE UR: CLEAR
AST SERPL-CCNC: 20 U/L (ref 15–37)
BACTERIA URNS QL MICRO: NEGATIVE /HPF
BASE EXCESS BLDV CALC-SCNC: 0.1 MMOL/L
BASOPHILS # BLD: 0 K/UL (ref 0–0.1)
BASOPHILS NFR BLD: 1 % (ref 0–1)
BILIRUB SERPL-MCNC: 0.4 MG/DL (ref 0.2–1)
BILIRUB UR QL: NEGATIVE
BNP SERPL-MCNC: 450 PG/ML
BUN SERPL-MCNC: 22 MG/DL (ref 6–20)
BUN/CREAT SERPL: 23 (ref 12–20)
CALCIUM SERPL-MCNC: 8.9 MG/DL (ref 8.5–10.1)
CHLORIDE SERPL-SCNC: 105 MMOL/L (ref 97–108)
CO2 SERPL-SCNC: 28 MMOL/L (ref 21–32)
COLOR UR: NORMAL
COMMENT, HOLDF: NORMAL
CREAT SERPL-MCNC: 0.95 MG/DL (ref 0.7–1.3)
D DIMER PPP FEU-MCNC: 0.35 MG/L FEU (ref 0–0.65)
DIFFERENTIAL METHOD BLD: ABNORMAL
EOSINOPHIL # BLD: 0.1 K/UL (ref 0–0.4)
EOSINOPHIL NFR BLD: 2 % (ref 0–7)
EPITH CASTS URNS QL MICRO: NORMAL /LPF
ERYTHROCYTE [DISTWIDTH] IN BLOOD BY AUTOMATED COUNT: 13.1 % (ref 11.5–14.5)
GLOBULIN SER CALC-MCNC: 3.1 G/DL (ref 2–4)
GLUCOSE BLD STRIP.AUTO-MCNC: 122 MG/DL (ref 65–117)
GLUCOSE SERPL-MCNC: 150 MG/DL (ref 65–100)
GLUCOSE UR STRIP.AUTO-MCNC: NEGATIVE MG/DL
HCO3 BLDV-SCNC: 25.6 MMOL/L (ref 23–28)
HCT VFR BLD AUTO: 40.7 % (ref 36.6–50.3)
HGB BLD-MCNC: 13.4 G/DL (ref 12.1–17)
HGB UR QL STRIP: NEGATIVE
HYALINE CASTS URNS QL MICRO: NORMAL /LPF (ref 0–5)
IMM GRANULOCYTES # BLD AUTO: 0 K/UL (ref 0–0.04)
IMM GRANULOCYTES NFR BLD AUTO: 0 % (ref 0–0.5)
INR PPP: 1.7 (ref 0.9–1.1)
KETONES UR QL STRIP.AUTO: NEGATIVE MG/DL
LEUKOCYTE ESTERASE UR QL STRIP.AUTO: NEGATIVE
LYMPHOCYTES # BLD: 1.6 K/UL (ref 0.8–3.5)
LYMPHOCYTES NFR BLD: 26 % (ref 12–49)
MCH RBC QN AUTO: 33.3 PG (ref 26–34)
MCHC RBC AUTO-ENTMCNC: 32.9 G/DL (ref 30–36.5)
MCV RBC AUTO: 101 FL (ref 80–99)
MONOCYTES # BLD: 0.7 K/UL (ref 0–1)
MONOCYTES NFR BLD: 11 % (ref 5–13)
NEUTS SEG # BLD: 3.7 K/UL (ref 1.8–8)
NEUTS SEG NFR BLD: 60 % (ref 32–75)
NITRITE UR QL STRIP.AUTO: NEGATIVE
NRBC # BLD: 0 K/UL (ref 0–0.01)
NRBC BLD-RTO: 0 PER 100 WBC
PCO2 BLDV: 43.9 MMHG (ref 41–51)
PH BLDV: 7.37 [PH] (ref 7.32–7.42)
PH UR STRIP: 5.5 [PH] (ref 5–8)
PLATELET # BLD AUTO: 221 K/UL (ref 150–400)
PMV BLD AUTO: 9.6 FL (ref 8.9–12.9)
PO2 BLDV: 35 MMHG (ref 25–40)
POTASSIUM SERPL-SCNC: 4.5 MMOL/L (ref 3.5–5.1)
PROT SERPL-MCNC: 6.8 G/DL (ref 6.4–8.2)
PROT UR STRIP-MCNC: NEGATIVE MG/DL
PROTHROMBIN TIME: 17 SEC (ref 9–11.1)
RBC # BLD AUTO: 4.03 M/UL (ref 4.1–5.7)
RBC #/AREA URNS HPF: NORMAL /HPF (ref 0–5)
SAMPLES BEING HELD,HOLD: NORMAL
SAO2 % BLDV: 65 % (ref 65–88)
SERVICE CMNT-IMP: ABNORMAL
SERVICE CMNT-IMP: NORMAL
SODIUM SERPL-SCNC: 137 MMOL/L (ref 136–145)
SP GR UR REFRACTOMETRY: 1.02 (ref 1–1.03)
SPECIMEN TYPE: NORMAL
TROPONIN-HIGH SENSITIVITY: 5 NG/L (ref 0–76)
UA: UC IF INDICATED,UAUC: NORMAL
UROBILINOGEN UR QL STRIP.AUTO: 0.2 EU/DL (ref 0.2–1)
WBC # BLD AUTO: 6.1 K/UL (ref 4.1–11.1)
WBC URNS QL MICRO: NORMAL /HPF (ref 0–4)

## 2022-09-24 PROCEDURE — 93005 ELECTROCARDIOGRAM TRACING: CPT

## 2022-09-24 PROCEDURE — 36415 COLL VENOUS BLD VENIPUNCTURE: CPT

## 2022-09-24 PROCEDURE — 71046 X-RAY EXAM CHEST 2 VIEWS: CPT

## 2022-09-24 PROCEDURE — 82803 BLOOD GASES ANY COMBINATION: CPT

## 2022-09-24 PROCEDURE — 84484 ASSAY OF TROPONIN QUANT: CPT

## 2022-09-24 PROCEDURE — 82962 GLUCOSE BLOOD TEST: CPT

## 2022-09-24 PROCEDURE — 74011250637 HC RX REV CODE- 250/637: Performed by: STUDENT IN AN ORGANIZED HEALTH CARE EDUCATION/TRAINING PROGRAM

## 2022-09-24 PROCEDURE — 80053 COMPREHEN METABOLIC PANEL: CPT

## 2022-09-24 PROCEDURE — 83880 ASSAY OF NATRIURETIC PEPTIDE: CPT

## 2022-09-24 PROCEDURE — 85025 COMPLETE CBC W/AUTO DIFF WBC: CPT

## 2022-09-24 PROCEDURE — 85610 PROTHROMBIN TIME: CPT

## 2022-09-24 PROCEDURE — 99285 EMERGENCY DEPT VISIT HI MDM: CPT

## 2022-09-24 PROCEDURE — 81001 URINALYSIS AUTO W/SCOPE: CPT

## 2022-09-24 PROCEDURE — 85379 FIBRIN DEGRADATION QUANT: CPT

## 2022-09-24 RX ORDER — SOTALOL HYDROCHLORIDE 80 MG/1
80 TABLET ORAL 2 TIMES DAILY
Status: DISCONTINUED | OUTPATIENT
Start: 2022-09-24 | End: 2022-09-25 | Stop reason: HOSPADM

## 2022-09-24 RX ORDER — SOTALOL HYDROCHLORIDE 80 MG/1
80 TABLET ORAL 2 TIMES DAILY
Status: DISCONTINUED | OUTPATIENT
Start: 2022-09-25 | End: 2022-09-24

## 2022-09-24 RX ADMIN — SOTALOL HYDROCHLORIDE 80 MG: 80 TABLET ORAL at 23:31

## 2022-09-24 NOTE — ED TRIAGE NOTES
Patient arrives to ED complaining of worsening shortness of breath for 3 days. History of Aortic stenosis.

## 2022-09-24 NOTE — ED PROVIDER NOTES
EMERGENCY DEPARTMENT HISTORY AND PHYSICAL EXAM      Date: 9/24/2022  Patient Name: Hyacinth Way    History of Presenting Illness     HPI: Hyacinth Way, 80 y.o. male with past medical history of type 2 diabetes, ISABELLA, afib, DVT and PE, anticoagulated with Coumadin, anxiety, polymyalgia rheumatica, presents to the ED with cc of feeling \"slow\" and \"dragging more\" over the past month. Patient reports that the symptoms have been progressive, and over the past 3 days he has been feeling slightly more short of breath as well. States that it is most noticeable when he walks uphill, denies any shortness of breath at rest.  He denies any history of CHF, COPD, asthma. He denies any associated chest pain. States that the symptoms do not feel like his prior PE. Reports compliance with his Coumadin. Patient states that he discussed the symptoms with his PCP, who advised for him to come in for evaluation to ensure everything looks okay. States that he does have a history of aortic stenosis, and is unsure if current symptoms could be related to this. Patient did undergo stress test with his cardiologist approximately 2 months ago, states that results were reassuring. He denies any lower extremity swelling, calf tenderness, orthopnea, PND, or unexpected weight gain. PCP: Mary Diaz MD    No current facility-administered medications on file prior to encounter. Current Outpatient Medications on File Prior to Encounter   Medication Sig Dispense Refill    metFORMIN ER (GLUCOPHAGE XR) 500 mg tablet Take 1 Tablet by mouth three (3) times daily (with meals). 270 Tablet 1    allopurinoL (ZYLOPRIM) 100 mg tablet Take 0.5 Tablets by mouth daily. 45 Tablet 1    sotaloL (BETAPACE) 80 mg tablet Take 1 Tablet by mouth two (2) times a day. 180 Tablet 1    PARoxetine (PaxiL) 20 mg tablet Take 1 Tablet by mouth two (2) times a day.  180 Tablet 1    warfarin (COUMADIN) 1 mg tablet Take 2.5mg on Monday and Friday; take 3.5mg all other days. 60 Tablet 1    atorvastatin (LIPITOR) 10 mg tablet Take 1 Tablet by mouth daily. Labs due for refill. 90 Tablet 0    glucose blood VI test strips (blood glucose test) strip Use to check fasting and evening blood sugar daily. Patient may use whichever product is covered by insurance. 180 Strip 0    lancets misc Use to check fasting and evening blood sugar daily. Patient may use whichever product is covered by insurance. 1 Each 11    busPIRone (BUSPAR) 5 mg tablet Take 1 Tablet by mouth two (2) times a day. 180 Tablet 1    warfarin (Coumadin) 2.5 mg tablet Use as directed 180 Tablet 1    Blood-Glucose Meter monitoring kit Use to check fasting and evening blood sugar daily. Patient may use whichever product is covered by insurance. 1 Kit 0    senna-docusate (PERICOLACE) 8.6-50 mg per tablet Take 1 Tablet by mouth two (2) times daily as needed for Constipation. 60 Tablet 0    acetaminophen (TYLENOL) 500 mg tablet Take 1 Tablet by mouth every four (4) hours. 100 Tablet 0    iron 18 mg tab Take 1 mg by mouth. Lactobacillus acidophilus (PROBIOTIC PO) Take 15 billion cell by mouth daily. digestive enzymes cap Take 220 mg by mouth daily. OTHER,NON-FORMULARY, Take 10 mg by mouth daily. HEMP EXTRACT GUMMIES      b complex-folic acid 0.4 mg tablet Take 1 Tab by mouth daily. Comp. Stocking,Knee,Regular,Lrg (Relief Knee Open Toe Stocking) misc Use daily for lower extremity swelling, Size large 30-40mm, Model # [id number] 2 Each 1    ascorbic acid, vitamin C, (VITAMIN C) 500 mg tablet Take  by mouth.      gluc case/chondro case A/vit C/Mn (GLUCOSAMINE 1500 COMPLEX PO) Take  by mouth. folic acid (FOLVITE) 1 mg tablet Take  by mouth daily. cholecalciferol (VITAMIN D3) (2,000 UNITS /50 MCG) cap capsule Take 2,000 Units by mouth daily. magnesium 250 mg tab Take  by mouth.      calcium carbonate (CALCIUM 500 PO) Take  by mouth.       turmeric (CURCUMIN) 1 Tablet by Does Not Apply route daily. Past History     Past Medical History:  Past Medical History:   Diagnosis Date    Acoustic neuroma ([facility] 2020    Basal cell adenocarcinoma     Chronic deep vein thrombosis (DVT) of lower extremity ([facility] [address] 2020    1.8 to 2.5 is goal    COVID-19 vaccine series completed     MODERNA 21 & 21    Hearing loss, right     Hypercholesteremia 2020    Lipitor 20mg    Melanoma in situ ([facility] [address]     TOP OF HEAD AND ON OUTSIDE OF LEFT KNEE    ISABELLA on CPAP 2020    Paroxysmal tachycardia, unspecified ([facility] [address] 2020    Sotalol Saw Hal Frankel MD     Polymyalgia rheumatica [facility])     Psychiatric disorder     MILD ANXIETY     Skin cancer     SQUAMUS CELL        Past Surgical History:  Past Surgical History:   Procedure Laterality Date    HX COLONOSCOPY      hx of GI bleed    HX ENDOSCOPY      HX GI      EXCISION IN COLONON     HX HERNIA REPAIR      X3    HX ORTHOPAEDIC Right     SHOULDER     HX TONSILLECTOMY      HX WISDOM TEETH EXTRACTION         Family History:  Family History   Problem Relation Age of Onset    Heart Disease Father         MI    OSTEOARTHRITIS Sister     Anesth Problems Neg Hx        Social History:  Social History     Tobacco Use    Smoking status: Former     Types: Pipe     Quit date: 1/15/1981     Years since quittin.7    Smokeless tobacco: Never    Tobacco comments:     PIPE   Vaping Use    Vaping Use: Never used   Substance Use Topics    Alcohol use: Yes     Alcohol/week: 21.0 standard drinks     Types: 7 Glasses of wine, 7 Cans of beer, 7 Shots of liquor per week     Comment: HAS ONE OR THE OTHER ALTERNATES A DIFFERENT ON EACH DAY     Drug use: Never       Allergies: Allergies   Allergen Reactions    Sulfa (Sulfonamide Antibiotics) Rash         Review of Systems   Review of Systems   Constitutional:  Positive for fatigue. Negative for chills and fever. HENT:  Negative for congestion and rhinorrhea. Eyes:  Negative for visual disturbance.    Respiratory: Positive for shortness of breath. Negative for apnea, cough, chest tightness and wheezing. Gastrointestinal:  Negative for abdominal pain, diarrhea, nausea and vomiting. Genitourinary:  Negative for dysuria, flank pain and hematuria. Musculoskeletal:  Negative for back pain and neck pain. Skin:  Negative for rash. Allergic/Immunologic: Negative for immunocompromised state. Neurological:  Negative for dizziness, speech difficulty, weakness and headaches. Hematological:  Negative for adenopathy. Psychiatric/Behavioral:  Negative for dysphoric mood and suicidal ideas. Physical Exam   Physical Exam  Vitals and nursing note reviewed. Constitutional:       General: He is not in acute distress. Appearance: Normal appearance. He is not ill-appearing or toxic-appearing. HENT:      Head: Normocephalic and atraumatic. Nose: Nose normal.      Mouth/Throat:      Mouth: Mucous membranes are moist.   Eyes:      Extraocular Movements: Extraocular movements intact. Pupils: Pupils are equal, round, and reactive to light. Cardiovascular:      Rate and Rhythm: Normal rate and regular rhythm. Pulses: Normal pulses. Pulmonary:      Effort: Pulmonary effort is normal. No respiratory distress. Breath sounds: Normal breath sounds. No stridor. No decreased breath sounds, wheezing or rhonchi. Abdominal:      General: Abdomen is flat. There is no distension. Palpations: There is no mass. Tenderness: There is no abdominal tenderness. Musculoskeletal:         General: Normal range of motion. Cervical back: Normal range of motion and neck supple. Right lower leg: No tenderness. No edema. Left lower leg: No tenderness. No edema. Skin:     General: Skin is warm and dry. Neurological:      General: No focal deficit present. Mental Status: He is alert. Mental status is at baseline. Sensory: No sensory deficit. Motor: No weakness.        Diagnostic Study Results     Labs -     Recent Results (from the past 24 hour(s))   CBC WITH AUTOMATED DIFF    Collection Time: 09/24/22  6:06 PM   Result Value Ref Range    WBC 6.1 4.1 - 11.1 K/uL    RBC 4.03 (L) 4.10 - 5.70 M/uL    HGB 13.4 12.1 - 17.0 g/dL    HCT 40.7 36.6 - 50.3 %    .0 (H) 80.0 - 99.0 FL    MCH 33.3 26.0 - 34.0 PG    MCHC 32.9 30.0 - 36.5 g/dL    RDW 13.1 11.5 - 14.5 %    PLATELET 331 586 - 763 K/uL    MPV 9.6 8.9 - 12.9 FL    NRBC 0.0 0  WBC    ABSOLUTE NRBC 0.00 0.00 - 0.01 K/uL    NEUTROPHILS 60 32 - 75 %    LYMPHOCYTES 26 12 - 49 %    MONOCYTES 11 5 - 13 %    EOSINOPHILS 2 0 - 7 %    BASOPHILS 1 0 - 1 %    IMMATURE GRANULOCYTES 0 0.0 - 0.5 %    ABS. NEUTROPHILS 3.7 1.8 - 8.0 K/UL    ABS. LYMPHOCYTES 1.6 0.8 - 3.5 K/UL    ABS. MONOCYTES 0.7 0.0 - 1.0 K/UL    ABS. EOSINOPHILS 0.1 0.0 - 0.4 K/UL    ABS. BASOPHILS 0.0 0.0 - 0.1 K/UL    ABS. IMM. GRANS. 0.0 0.00 - 0.04 K/UL    DF AUTOMATED     METABOLIC PANEL, COMPREHENSIVE    Collection Time: 09/24/22  6:06 PM   Result Value Ref Range    Sodium 137 136 - 145 mmol/L    Potassium 4.5 3.5 - 5.1 mmol/L    Chloride 105 97 - 108 mmol/L    CO2 28 21 - 32 mmol/L    Anion gap 4 (L) 5 - 15 mmol/L    Glucose 150 (H) 65 - 100 mg/dL    BUN 22 (H) 6 - 20 MG/DL    Creatinine 0.95 0.70 - 1.30 MG/DL    BUN/Creatinine ratio 23 (H) 12 - 20      GFR est AA >60 >60 ml/min/1.73m2    GFR est non-AA >60 >60 ml/min/1.73m2    Calcium 8.9 8.5 - 10.1 MG/DL    Bilirubin, total 0.4 0.2 - 1.0 MG/DL    ALT (SGPT) 27 12 - 78 U/L    AST (SGOT) 20 15 - 37 U/L    Alk.  phosphatase 64 45 - 117 U/L    Protein, total 6.8 6.4 - 8.2 g/dL    Albumin 3.7 3.5 - 5.0 g/dL    Globulin 3.1 2.0 - 4.0 g/dL    A-G Ratio 1.2 1.1 - 2.2     SAMPLES BEING HELD    Collection Time: 09/24/22  6:06 PM   Result Value Ref Range    SAMPLES BEING HELD 1RED,1BLU     COMMENT        Add-on orders for these samples will be processed based on acceptable specimen integrity and analyte stability, which may vary by analyte. Radiologic Studies -   XR CHEST PA LAT   Final Result    Possible small pleural effusions, crowded lung markings in the lung bases left   greater than right with elevation of the left hemidiaphragm. Correlate with   previous clinical history and available images. .  . CT Results  (Last 48 hours)      None          CXR Results  (Last 48 hours)                 09/24/22 1827  XR CHEST PA LAT Final result    Impression:   Possible small pleural effusions, crowded lung markings in the lung bases left   greater than right with elevation of the left hemidiaphragm. Correlate with   previous clinical history and available images. .  . Narrative:  INDICATION:  shortness of breat'. EXAM: 2 VIEW CHEST RADIOGRAPH. COMPARISON: None. FINDINGS: Frontal and lateral views of the chest show elevation of the left   hemidiaphragm, with crowded lung markings in both lung bases and possible small   pleural effusions. . . The heart, mediastinum and pulmonary vasculature are   normal.  The bony thorax is unremarkable for age, except for right shoulder   arthroplasty. . .. Incidentally noted herniorrhaphy clips over the anterior   abdominal wall. Medical Decision Making   I, Sandhya Harmon MD-- am the first provider for this patient, and I am the attending of record for this patient encounter. I reviewed the vital signs, available nursing notes, past medical history, past surgical history, family history and social history. Vital Signs-Reviewed the patient's vital signs. Patient Vitals for the past 24 hrs:   Temp Pulse Resp BP SpO2   09/24/22 1859 -- 67 16 125/83 --   09/24/22 1739 97.8 °F (36.6 °C) 75 20 130/61 96 %       EKG interpretation: (Preliminary)  Aflutter with variable block, nonspecific ST changes.  EKG interpretation by Sandhya Harmon MD    Records Reviewed: Nursing Notes and Old Medical Records    Provider Notes (Medical Decision Making):   Ddx: symptomatic aflutter, CHF exacerbation, ACS, asthma, dehydration, anxiety, etc.     Plan: EKG, troponin, probnp, VBG, CBC, CMP, PT/INR, CXR, UA      ED Course as of 09/24/22 2303   Sat Sep 24, 2022   1752 EKG shows atrial flutter with variable block rate of 66, normal axis, narrow QRS, normal QTC, no ST elevations or depressions [CC]   2014 INR is subtherapeutic at 1.7. Will check ddimer as lower suspicision for PE due to no tachycardia, hypotension, hypoxia, and no CP or Sob at this time. [JM]   2237 Ddimer is low. Patient reevaluated at this time- noted on the cardiac monitor that patient is going into intermittently bradycardiac HR of 40s in setting of aflutter with prolonged variable block. Considering he has been symptomatic with increased fatigue/sob, will admit for cardiology to see. Will order patient's home sotalol at this time. [JM]      ED Course User Index  [CC] Mitra, [address],   [JM] Twyla Guallpa MD     Perfect Serve Consult for Admission  11:03 PM    ED Room Number: ER16/16  Patient Name and age:  Shirley Leader 80 y.o.  male  Working Diagnosis:   1. Atrial flutter, unspecified type ([address]    2. Bradycardia    3. SOB (shortness of breath)        COVID-19 Suspicion:  no  Sepsis present:  no  Reassessment needed: no  Code Status:  Full Code  Readmission: no  Isolation Requirements:  no  Recommended Level of Care:  telemetry  Department:[facility] Adult ED - 21     ED Course:   Initial assessment performed. The patient's presenting problems have been discussed, and they are in agreement with the care plan formulated and outlined with them. I have encouraged them to ask questions as they arise throughout their visit. Mounika Lugo MD      Disposition:  Admit      Diagnosis     Clinical Impression:   1. Atrial flutter, unspecified type ([address]    2. Bradycardia    3.  SOB (shortness of breath)        Attestations:    Mounika Lugo MD    Please note that this dictation was completed with [email or web address] computer voice recognition software. Quite often unanticipated grammatical, syntax, homophones, and other interpretive errors are inadvertently transcribed by the computer software. Please disregard these errors. Please excuse any errors that have escaped final proofreading. Thank you.

## 2022-09-24 NOTE — ED NOTES
Bedside shift change report given to Chloe (oncoming nurse) by Broderick Blake (offgoing nurse). Report included the following information SBAR and ED Summary.

## 2022-09-25 VITALS
RESPIRATION RATE: 17 BRPM | SYSTOLIC BLOOD PRESSURE: 113 MMHG | TEMPERATURE: 97.8 F | HEART RATE: 65 BPM | DIASTOLIC BLOOD PRESSURE: 62 MMHG | OXYGEN SATURATION: 97 %

## 2022-09-25 PROBLEM — I48.92 ATRIAL FLUTTER (HCC): Status: RESOLVED | Noted: 2022-09-25 | Resolved: 2022-09-25

## 2022-09-25 PROBLEM — I48.92 ATRIAL FLUTTER (HCC): Status: ACTIVE | Noted: 2022-09-25

## 2022-09-25 LAB
ALBUMIN SERPL-MCNC: 3.4 G/DL (ref 3.5–5)
ALBUMIN/GLOB SERPL: 1.4 {RATIO} (ref 1.1–2.2)
ALP SERPL-CCNC: 60 U/L (ref 45–117)
ALT SERPL-CCNC: 26 U/L (ref 12–78)
AMPHET UR QL SCN: NEGATIVE
ANION GAP SERPL CALC-SCNC: 5 MMOL/L (ref 5–15)
AST SERPL-CCNC: 21 U/L (ref 15–37)
ATRIAL RATE: 326 BPM
BARBITURATES UR QL SCN: NEGATIVE
BASOPHILS # BLD: 0 K/UL (ref 0–0.1)
BASOPHILS NFR BLD: 1 % (ref 0–1)
BENZODIAZ UR QL: NEGATIVE
BILIRUB SERPL-MCNC: 0.4 MG/DL (ref 0.2–1)
BUN SERPL-MCNC: 19 MG/DL (ref 6–20)
BUN/CREAT SERPL: 23 (ref 12–20)
CALCIUM SERPL-MCNC: 8.3 MG/DL (ref 8.5–10.1)
CALCULATED P AXIS, ECG09: -142 DEGREES
CALCULATED R AXIS, ECG10: 15 DEGREES
CALCULATED T AXIS, ECG11: 21 DEGREES
CANNABINOIDS UR QL SCN: NEGATIVE
CHLORIDE SERPL-SCNC: 108 MMOL/L (ref 97–108)
CO2 SERPL-SCNC: 27 MMOL/L (ref 21–32)
COCAINE UR QL SCN: NEGATIVE
CREAT SERPL-MCNC: 0.81 MG/DL (ref 0.7–1.3)
DIAGNOSIS, 93000: NORMAL
DIFFERENTIAL METHOD BLD: ABNORMAL
DRUG SCRN COMMENT,DRGCM: NORMAL
EOSINOPHIL # BLD: 0.1 K/UL (ref 0–0.4)
EOSINOPHIL NFR BLD: 3 % (ref 0–7)
ERYTHROCYTE [DISTWIDTH] IN BLOOD BY AUTOMATED COUNT: 13.2 % (ref 11.5–14.5)
EST. AVERAGE GLUCOSE BLD GHB EST-MCNC: 148 MG/DL
GLOBULIN SER CALC-MCNC: 2.5 G/DL (ref 2–4)
GLUCOSE BLD STRIP.AUTO-MCNC: 136 MG/DL (ref 65–117)
GLUCOSE SERPL-MCNC: 143 MG/DL (ref 65–100)
HBA1C MFR BLD: 6.8 % (ref 4–5.6)
HCT VFR BLD AUTO: 35.1 % (ref 36.6–50.3)
HGB BLD-MCNC: 11.7 G/DL (ref 12.1–17)
IMM GRANULOCYTES # BLD AUTO: 0 K/UL (ref 0–0.04)
IMM GRANULOCYTES NFR BLD AUTO: 0 % (ref 0–0.5)
INR PPP: 1.8 (ref 0.9–1.1)
LYMPHOCYTES # BLD: 1.4 K/UL (ref 0.8–3.5)
LYMPHOCYTES NFR BLD: 26 % (ref 12–49)
MAGNESIUM SERPL-MCNC: 1.8 MG/DL (ref 1.6–2.4)
MCH RBC QN AUTO: 33.2 PG (ref 26–34)
MCHC RBC AUTO-ENTMCNC: 33.3 G/DL (ref 30–36.5)
MCV RBC AUTO: 99.7 FL (ref 80–99)
METHADONE UR QL: NEGATIVE
MONOCYTES # BLD: 0.7 K/UL (ref 0–1)
MONOCYTES NFR BLD: 14 % (ref 5–13)
NEUTS SEG # BLD: 3 K/UL (ref 1.8–8)
NEUTS SEG NFR BLD: 56 % (ref 32–75)
NRBC # BLD: 0 K/UL (ref 0–0.01)
NRBC BLD-RTO: 0 PER 100 WBC
OPIATES UR QL: NEGATIVE
PCP UR QL: NEGATIVE
PHOSPHATE SERPL-MCNC: 3.4 MG/DL (ref 2.6–4.7)
PLATELET # BLD AUTO: 184 K/UL (ref 150–400)
PMV BLD AUTO: 9.9 FL (ref 8.9–12.9)
POTASSIUM SERPL-SCNC: 3.9 MMOL/L (ref 3.5–5.1)
PROT SERPL-MCNC: 5.9 G/DL (ref 6.4–8.2)
PROTHROMBIN TIME: 17.7 SEC (ref 9–11.1)
Q-T INTERVAL, ECG07: 412 MS
QRS DURATION, ECG06: 92 MS
QTC CALCULATION (BEZET), ECG08: 431 MS
RBC # BLD AUTO: 3.52 M/UL (ref 4.1–5.7)
SERVICE CMNT-IMP: ABNORMAL
SODIUM SERPL-SCNC: 140 MMOL/L (ref 136–145)
T4 FREE SERPL-MCNC: 0.9 NG/DL (ref 0.8–1.5)
TROPONIN-HIGH SENSITIVITY: 4 NG/L (ref 0–76)
TSH SERPL DL<=0.05 MIU/L-ACNC: 4.99 UIU/ML (ref 0.36–3.74)
VENTRICULAR RATE, ECG03: 66 BPM
WBC # BLD AUTO: 5.2 K/UL (ref 4.1–11.1)

## 2022-09-25 PROCEDURE — 82962 GLUCOSE BLOOD TEST: CPT

## 2022-09-25 PROCEDURE — 36415 COLL VENOUS BLD VENIPUNCTURE: CPT

## 2022-09-25 PROCEDURE — 80307 DRUG TEST PRSMV CHEM ANLYZR: CPT

## 2022-09-25 PROCEDURE — 74011250637 HC RX REV CODE- 250/637: Performed by: INTERNAL MEDICINE

## 2022-09-25 PROCEDURE — 84484 ASSAY OF TROPONIN QUANT: CPT

## 2022-09-25 PROCEDURE — 84439 ASSAY OF FREE THYROXINE: CPT

## 2022-09-25 PROCEDURE — 80053 COMPREHEN METABOLIC PANEL: CPT

## 2022-09-25 PROCEDURE — 85025 COMPLETE CBC W/AUTO DIFF WBC: CPT

## 2022-09-25 PROCEDURE — G0378 HOSPITAL OBSERVATION PER HR: HCPCS

## 2022-09-25 PROCEDURE — 83735 ASSAY OF MAGNESIUM: CPT

## 2022-09-25 PROCEDURE — 84100 ASSAY OF PHOSPHORUS: CPT

## 2022-09-25 PROCEDURE — 85610 PROTHROMBIN TIME: CPT

## 2022-09-25 PROCEDURE — 74011250637 HC RX REV CODE- 250/637: Performed by: STUDENT IN AN ORGANIZED HEALTH CARE EDUCATION/TRAINING PROGRAM

## 2022-09-25 PROCEDURE — 65390000012 HC CONDITION CODE 44 OBSERVATION

## 2022-09-25 PROCEDURE — 84443 ASSAY THYROID STIM HORMONE: CPT

## 2022-09-25 PROCEDURE — 83036 HEMOGLOBIN GLYCOSYLATED A1C: CPT

## 2022-09-25 RX ORDER — BUSPIRONE HYDROCHLORIDE 5 MG/1
7.5 TABLET ORAL 3 TIMES DAILY
Status: DISCONTINUED | OUTPATIENT
Start: 2022-09-25 | End: 2022-09-25 | Stop reason: HOSPADM

## 2022-09-25 RX ORDER — MAGNESIUM SULFATE 100 %
4 CRYSTALS MISCELLANEOUS AS NEEDED
Status: DISCONTINUED | OUTPATIENT
Start: 2022-09-25 | End: 2022-09-25 | Stop reason: HOSPADM

## 2022-09-25 RX ORDER — FUROSEMIDE 10 MG/ML
40 INJECTION INTRAMUSCULAR; INTRAVENOUS ONCE
Status: DISPENSED | OUTPATIENT
Start: 2022-09-25 | End: 2022-09-25

## 2022-09-25 RX ORDER — FOLIC ACID 1 MG/1
1 TABLET ORAL DAILY
Status: DISCONTINUED | OUTPATIENT
Start: 2022-09-25 | End: 2022-09-25 | Stop reason: HOSPADM

## 2022-09-25 RX ORDER — LANOLIN ALCOHOL/MO/W.PET/CERES
100 CREAM (GRAM) TOPICAL DAILY
Status: DISCONTINUED | OUTPATIENT
Start: 2022-09-25 | End: 2022-09-25 | Stop reason: HOSPADM

## 2022-09-25 RX ORDER — ATORVASTATIN CALCIUM 10 MG/1
10 TABLET, FILM COATED ORAL DAILY
Status: DISCONTINUED | OUTPATIENT
Start: 2022-09-25 | End: 2022-09-25 | Stop reason: HOSPADM

## 2022-09-25 RX ORDER — THERA TABS 400 MCG
1 TAB ORAL DAILY
Status: DISCONTINUED | OUTPATIENT
Start: 2022-09-25 | End: 2022-09-25 | Stop reason: HOSPADM

## 2022-09-25 RX ORDER — MIRTAZAPINE 15 MG/1
7.5 TABLET, FILM COATED ORAL
Status: DISCONTINUED | OUTPATIENT
Start: 2022-09-25 | End: 2022-09-25 | Stop reason: HOSPADM

## 2022-09-25 RX ORDER — POLYETHYLENE GLYCOL 3350 17 G/17G
17 POWDER, FOR SOLUTION ORAL DAILY PRN
Status: DISCONTINUED | OUTPATIENT
Start: 2022-09-25 | End: 2022-09-25 | Stop reason: HOSPADM

## 2022-09-25 RX ORDER — ACETAMINOPHEN 325 MG/1
650 TABLET ORAL
Status: DISCONTINUED | OUTPATIENT
Start: 2022-09-25 | End: 2022-09-25 | Stop reason: HOSPADM

## 2022-09-25 RX ORDER — SODIUM CHLORIDE 0.9 % (FLUSH) 0.9 %
5-40 SYRINGE (ML) INJECTION AS NEEDED
Status: DISCONTINUED | OUTPATIENT
Start: 2022-09-25 | End: 2022-09-25 | Stop reason: HOSPADM

## 2022-09-25 RX ORDER — LORAZEPAM 0.5 MG/1
0.5 TABLET ORAL
Status: DISCONTINUED | OUTPATIENT
Start: 2022-09-25 | End: 2022-09-25 | Stop reason: HOSPADM

## 2022-09-25 RX ORDER — SODIUM CHLORIDE 0.9 % (FLUSH) 0.9 %
5-40 SYRINGE (ML) INJECTION EVERY 8 HOURS
Status: DISCONTINUED | OUTPATIENT
Start: 2022-09-25 | End: 2022-09-25 | Stop reason: HOSPADM

## 2022-09-25 RX ORDER — ALLOPURINOL 100 MG/1
50 TABLET ORAL DAILY
Status: DISCONTINUED | OUTPATIENT
Start: 2022-09-25 | End: 2022-09-25 | Stop reason: HOSPADM

## 2022-09-25 RX ORDER — ACETAMINOPHEN 650 MG/1
650 SUPPOSITORY RECTAL
Status: DISCONTINUED | OUTPATIENT
Start: 2022-09-25 | End: 2022-09-25 | Stop reason: HOSPADM

## 2022-09-25 RX ORDER — BUSPIRONE HYDROCHLORIDE 5 MG/1
5 TABLET ORAL 2 TIMES DAILY
Status: DISCONTINUED | OUTPATIENT
Start: 2022-09-25 | End: 2022-09-25

## 2022-09-25 RX ORDER — WARFARIN SODIUM 5 MG/1
5 TABLET ORAL ONCE
Status: COMPLETED | OUTPATIENT
Start: 2022-09-25 | End: 2022-09-25

## 2022-09-25 RX ORDER — PAROXETINE HYDROCHLORIDE 20 MG/1
20 TABLET, FILM COATED ORAL 2 TIMES DAILY
Status: DISCONTINUED | OUTPATIENT
Start: 2022-09-25 | End: 2022-09-25 | Stop reason: HOSPADM

## 2022-09-25 RX ORDER — DIAZEPAM 10 MG/2ML
5 INJECTION INTRAMUSCULAR
Status: DISCONTINUED | OUTPATIENT
Start: 2022-09-25 | End: 2022-09-25 | Stop reason: HOSPADM

## 2022-09-25 RX ORDER — INSULIN LISPRO 100 [IU]/ML
INJECTION, SOLUTION INTRAVENOUS; SUBCUTANEOUS
Status: DISCONTINUED | OUTPATIENT
Start: 2022-09-25 | End: 2022-09-25 | Stop reason: HOSPADM

## 2022-09-25 RX ORDER — FOLIC ACID 1 MG/1
1 TABLET ORAL DAILY
Status: DISCONTINUED | OUTPATIENT
Start: 2022-09-25 | End: 2022-09-25

## 2022-09-25 RX ADMIN — FOLIC ACID 1 MG: 1 TABLET ORAL at 13:41

## 2022-09-25 RX ADMIN — BUSPIRONE HYDROCHLORIDE 7.5 MG: 5 TABLET ORAL at 17:27

## 2022-09-25 RX ADMIN — WARFARIN SODIUM 5 MG: 5 TABLET ORAL at 17:28

## 2022-09-25 RX ADMIN — Medication 100 MG: at 13:41

## 2022-09-25 RX ADMIN — ATORVASTATIN CALCIUM 10 MG: 10 TABLET, FILM COATED ORAL at 13:41

## 2022-09-25 RX ADMIN — THERA TABS 1 TABLET: TAB at 13:41

## 2022-09-25 RX ADMIN — SOTALOL HYDROCHLORIDE 80 MG: 80 TABLET ORAL at 17:27

## 2022-09-25 RX ADMIN — ALLOPURINOL 50 MG: 100 TABLET ORAL at 13:41

## 2022-09-25 RX ADMIN — PAROXETINE HYDROCHLORIDE 20 MG: 20 TABLET, FILM COATED ORAL at 17:27

## 2022-09-25 NOTE — CONSULTS
[address] [facility] Cardiology Consultation    Date of Consult:  09/25/22  Date of Admission: 9/24/2022  Primary Cardiologist: Dr. Yisel Winslow  Physician Requesting consult: Dr. Maryuri Cordova:  Paroxymsal Atrial fibrillation   - initially plan was for ONEIL/cardioversion but he self converted  - continue sotalol 80 mg BID. He does not have room for further increase given his h/o bradycardia  - continue warfarin  - we discussed EP referral as outpatient. He will discuss with his primary cardiologist Dr. Yisel Winslow    2. 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 Mild aortic stenosis - per echo 8/2021    4. H/o DVT/PE  - on warfarin    5. HLD  6. DM II  7. PMR  8. ISABELLA    Ok for discharge from cardiac standpoint. Follow up with Dr. Yisel Winslow. Thank you for the opportunity to participate in the care of Usama Welsh and please do not hesitate to contact us should you have any questions. Chief Complaint / Reason for Consult:   AF    History of Present Illness:  Usama Welsh is a 80 y.o. male with the below listed medical history who was admitted with AFL. For the past several days he noted fatigued and mild dyspnea on exertion. No chest pain, orthopnea, PND or LE edema. No fever, chills, cough or other complaints. He was noted be in rate controlled coarse AF initially. This morning he self converted to NSR. He follows with Dr. Yisel Winslow. He had a stress test in Aug that was reportedly normal. He has never had cardioversion or ablation for AF previously.  He has been on warfarin for his h/o DVT/PE    Past Medical History:   Diagnosis Date    Acoustic neuroma ([facility] [address] 02/18/2020    Basal cell adenocarcinoma     Chronic deep vein thrombosis (DVT) of lower extremity ([facility] [address] 2/14/2020    1.8 to 2.5 is goal    COVID-19 vaccine series completed     MODERNA 03/07/21 & 04/04/21    Hearing loss, right     Hypercholesteremia 2/14/2020    Lipitor 20mg    Melanoma in situ ([facility] [address] TOP OF HEAD AND ON OUTSIDE OF LEFT KNEE    ISABELLA on CPAP 2/18/2020    Paroxysmal tachycardia, unspecified ([address] 2/14/2020    Sotalol Saw Ciera Gomez MD     Polymyalgia rheumatica [facility])     Psychiatric disorder     MILD ANXIETY     Skin cancer     SQUAMUS CELL        Prior to Admission medications    Medication Sig Start Date End Date Taking? Authorizing Provider   metFORMIN ER (GLUCOPHAGE XR) 500 mg tablet Take 1 Tablet by mouth three (3) times daily (with meals). 9/12/22   Ajit Farias MD   allopurinoL (ZYLOPRIM) 100 mg tablet Take 0.5 Tablets by mouth daily. 9/8/22   Ajit Farias MD   sotaloL (BETAPACE) 80 mg tablet Take 1 Tablet by mouth two (2) times a day. 9/8/22   Ajit Farias MD   PARoxetine (PaxiL) 20 mg tablet Take 1 Tablet by mouth two (2) times a day. 8/22/22   Ajit Farias MD   warfarin (COUMADIN) 1 mg tablet Take 2.5mg on Monday and Friday; take 3.5mg all other days. 8/17/22   Ajit Farias MD   atorvastatin (LIPITOR) 10 mg tablet Take 1 Tablet by mouth daily. Labs due for refill. 7/13/22   Ajit Farias MD   glucose blood VI test strips (blood glucose test) strip Use to check fasting and evening blood sugar daily. Patient may use whichever product is covered by insurance. 7/13/22   Ajit Farias MD   lancets misc Use to check fasting and evening blood sugar daily. Patient may use whichever product is covered by insurance. 7/13/22   Ajit Farias MD   busPIRone (BUSPAR) 5 mg tablet Take 1 Tablet by mouth two (2) times a day. 3/4/22   Ajit Farias MD   warfarin (Coumadin) 2.5 mg tablet Use as directed 12/9/21   Ajit Farias MD   Blood-Glucose Meter monitoring kit Use to check fasting and evening blood sugar daily. Patient may use whichever product is covered by insurance. 6/25/21   Ajit Farias MD   senna-docusate (PERICOLACE) 8.6-50 mg per tablet Take 1 Tablet by mouth two (2) times daily as needed for Constipation.  6/24/21   Barry Emily GUTIERREZ PA-C   acetaminophen (TYLENOL) 500 mg tablet Take 1 Tablet by mouth every four (4) hours. 6/24/21   Juan C Talavera PA-C   iron 18 mg tab Take 1 mg by mouth. Provider, Historical   Lactobacillus acidophilus (PROBIOTIC PO) Take 15 billion cell by mouth daily. Provider, Historical   digestive enzymes cap Take 220 mg by mouth daily. Provider, Historical   OTHER,NON-FORMULARY, Take 10 mg by mouth daily. HEMP EXTRACT GUMMIES    Provider, Historical   b complex-folic acid 0.4 mg tablet Take 1 Tab by mouth daily. Provider, Historical   Comp. Stocking,Knee,Regular,Lrg (Relief Knee Open Toe Stocking) misc Use daily for lower extremity swelling, Size large 30-40mm, Model # [id number] 6/15/20   Larry Tobin MD   ascorbic acid, vitamin C, (VITAMIN C) 500 mg tablet Take  by mouth. Provider, Historical   gluc case/chondro case A/vit C/Mn (GLUCOSAMINE 1500 COMPLEX PO) Take  by mouth. Provider, Historical   folic acid (FOLVITE) 1 mg tablet Take  by mouth daily. Provider, Historical   cholecalciferol (VITAMIN D3) (2,000 UNITS /50 MCG) cap capsule Take 2,000 Units by mouth daily. Provider, Historical   magnesium 250 mg tab Take  by mouth. Provider, Historical   calcium carbonate (CALCIUM 500 PO) Take  by mouth. Provider, Historical   turmeric (CURCUMIN) 1 Tablet by Does Not Apply route daily.     Provider, Historical       Current Facility-Administered Medications   Medication Dose Route Frequency    allopurinoL (ZYLOPRIM) tablet 50 mg  50 mg Oral DAILY    atorvastatin (LIPITOR) tablet 10 mg  10 mg Oral DAILY    PARoxetine (PAXIL) tablet 20 mg  20 mg Oral BID    sodium chloride (NS) flush 5-40 mL  5-40 mL IntraVENous Q8H    sodium chloride (NS) flush 5-40 mL  5-40 mL IntraVENous PRN    acetaminophen (TYLENOL) tablet 650 mg  650 mg Oral Q6H PRN    Or    acetaminophen (TYLENOL) suppository 650 mg  650 mg Rectal Q6H PRN    polyethylene glycol (MIRALAX) packet 17 g  17 g Oral DAILY PRN insulin lispro (HUMALOG) injection   SubCUTAneous AC&HS    glucose chewable tablet 16 g  4 Tablet Oral PRN    glucagon (GLUCAGEN) injection 1 mg  1 mg IntraMUSCular PRN    dextrose 10 % infusion 0-250 mL  0-250 mL IntraVENous PRN    furosemide (LASIX) injection 40 mg  40 mg IntraVENous ONCE    Warfarin: Pharmacy to dose   Other Rx Dosing/Monitoring    therapeutic multivitamin (THERAGRAN) tablet 1 Tablet  1 Tablet Oral DAILY    folic acid (FOLVITE) tablet 1 mg  1 mg Oral DAILY    thiamine HCL (B-1) tablet 100 mg  100 mg Oral DAILY    [Held by provider] diazePAM (VALIUM) injection 5 mg  5 mg IntraVENous Q6H PRN    busPIRone (BUSPAR) tablet 7.5 mg  7.5 mg Oral TID    LORazepam (ATIVAN) tablet 0.5 mg  0.5 mg Oral Q4H PRN    mirtazapine (REMERON) tablet 7.5 mg  7.5 mg Oral QHS    sotaloL (BETAPACE) tablet 80 mg  80 mg Oral BID     Current Outpatient Medications   Medication Sig    metFORMIN ER (GLUCOPHAGE XR) 500 mg tablet Take 1 Tablet by mouth three (3) times daily (with meals). allopurinoL (ZYLOPRIM) 100 mg tablet Take 0.5 Tablets by mouth daily. sotaloL (BETAPACE) 80 mg tablet Take 1 Tablet by mouth two (2) times a day. PARoxetine (PaxiL) 20 mg tablet Take 1 Tablet by mouth two (2) times a day. warfarin (COUMADIN) 1 mg tablet Take 2.5mg on Monday and Friday; take 3.5mg all other days. atorvastatin (LIPITOR) 10 mg tablet Take 1 Tablet by mouth daily. Labs due for refill.    glucose blood VI test strips (blood glucose test) strip Use to check fasting and evening blood sugar daily. Patient may use whichever product is covered by insurance. lancets misc Use to check fasting and evening blood sugar daily. Patient may use whichever product is covered by insurance. busPIRone (BUSPAR) 5 mg tablet Take 1 Tablet by mouth two (2) times a day. warfarin (Coumadin) 2.5 mg tablet Use as directed    Blood-Glucose Meter monitoring kit Use to check fasting and evening blood sugar daily.  Patient may use whichever product is covered by insurance. senna-docusate (PERICOLACE) 8.6-50 mg per tablet Take 1 Tablet by mouth two (2) times daily as needed for Constipation. acetaminophen (TYLENOL) 500 mg tablet Take 1 Tablet by mouth every four (4) hours. iron 18 mg tab Take 1 mg by mouth. Lactobacillus acidophilus (PROBIOTIC PO) Take 15 billion cell by mouth daily. digestive enzymes cap Take 220 mg by mouth daily. OTHER,NON-FORMULARY, Take 10 mg by mouth daily. HEMP EXTRACT GUMMIES    b complex-folic acid 0.4 mg tablet Take 1 Tab by mouth daily. Comp. Stocking,Knee,Regular,Lrg (Relief Knee Open Toe Stocking) misc Use daily for lower extremity swelling, Size large 30-40mm, Model # [id number]    ascorbic acid, vitamin C, (VITAMIN C) 500 mg tablet Take  by mouth.    gluc case/chondro case A/vit C/Mn (GLUCOSAMINE 1500 COMPLEX PO) Take  by mouth. folic acid (FOLVITE) 1 mg tablet Take  by mouth daily. cholecalciferol (VITAMIN D3) (2,000 UNITS /50 MCG) cap capsule Take 2,000 Units by mouth daily. magnesium 250 mg tab Take  by mouth.    calcium carbonate (CALCIUM 500 PO) Take  by mouth. turmeric (CURCUMIN) 1 Tablet by Does Not Apply route daily. Family History   Problem Relation Age of Onset    Heart Disease Father         MI    OSTEOARTHRITIS Sister     Anesth Problems Neg Hx      No family h/o SCD or premature CAD    Social History     Socioeconomic History    Marital status:      Spouse name: Not on file    Number of children: Not on file    Years of education: Not on file    Highest education level: Not on file   Occupational History    Not on file   Tobacco Use    Smoking status: Former     Types: Pipe     Quit date: 1/15/1981     Years since quittin.7    Smokeless tobacco: Never    Tobacco comments:     PIPE   Vaping Use    Vaping Use: Never used   Substance and Sexual Activity    Alcohol use:  Yes     Alcohol/week: 21.0 standard drinks     Types: 7 Glasses of wine, 7 Cans of beer, 7 Shots of liquor per week     Comment: HAS ONE OR THE OTHER ALTERNATES A DIFFERENT ON EACH DAY     Drug use: Never    Sexual activity: Yes   Other Topics Concern    Not on file   Social History Narrative    Not on file     Social Determinants of Health     Financial Resource Strain: Not on file   Food Insecurity: Not on file   Transportation Needs: Not on file   Physical Activity: Not on file   Stress: Not on file   Social Connections: Not on file   Intimate Partner Violence: Not on file   Housing Stability: Not on file       ROS  ROS: All other systems reviewed and were negative other than mentioned above.      Visit Vitals  /61   Pulse (!) 57   Temp 97.8 °F (36.6 °C)   Resp 16   SpO2 96%       No intake or output data in the 24 hours ending 09/25/22 0815     General: resting comfortably in no distress  HEENT: sclera anicteric, moist mucous membranes  Neck: supple, no JVD   CV: regular rate and rhythm, II/VI SABRINA   Lungs: no respiratory distress, lungs clear to auscultation without wheezing or rales  Abdomen: soft, non distended, non tender  MSK: no lower extremity edema  Skin: warm to touch  Neuro: alert and oriented, answered questions appropriately  Psych: normal mood and affect     Lab Review:  BMP:   Lab Results   Component Value Date/Time     09/25/2022 02:56 AM    K 3.9 09/25/2022 02:56 AM     09/25/2022 02:56 AM    CO2 27 09/25/2022 02:56 AM    AGAP 5 09/25/2022 02:56 AM     (H) 09/25/2022 02:56 AM    BUN 19 09/25/2022 02:56 AM    CREA 0.81 09/25/2022 02:56 AM    GFRAA >60 09/25/2022 02:56 AM    GFRNA >60 09/25/2022 02:56 AM        CBC:  Lab Results   Component Value Date/Time    WBC 5.2 09/25/2022 02:56 AM    HGB 11.7 (L) 09/25/2022 02:56 AM    HCT 35.1 (L) 09/25/2022 02:56 AM    PLATELET 538 96/39/9368 02:56 AM    MCV 99.7 (H) 09/25/2022 02:56 AM       All Cardiac Markers in the last 24 hours:    Lab Results   Component Value Date/Time    BNPNT 450 (H) 09/24/2022 06:06 PM       Lab Results   Component Value Date/Time    Cholesterol, total 140 11/11/2020 07:54 AM    HDL Cholesterol 61 11/11/2020 07:54 AM    LDL, calculated 64.8 11/11/2020 07:54 AM    VLDL, calculated 14.2 11/11/2020 07:54 AM    Triglyceride 71 11/11/2020 07:54 AM    CHOL/HDL Ratio 2.3 11/11/2020 07:54 AM        Data Review:  ECG tracing personally reviewed: coarse AF, less likely atypical atrial flutter, ventricular rate 66 bpm    Echocardiogram: 08/10/21    ECHO ADULT COMPLETE 08/11/2021 8/11/2021    Interpretation Summary  · LV: Estimated LVEF is 55 - 60%. Visually measured ejection fraction. Normal cavity size and systolic function (ejection fraction normal). Mild concentric hypertrophy. Wall motion: normal.  · AV: Aortic valve leaflet calcification present. Dimensionless index is 0.34. Aortic valve mean gradient is 12 mmHg. Aortic valve area is 1.4 cm2. Mild aortic valve stenosis is present. · MV: Mitral valve thickening. Mild mitral annular calcification. · TV: Right Ventricular Arterial Pressure (RVSP) is 21 mmHg. Pulmonary hypertension not suggested by Doppler findings. · Image quality for this study was technically difficult. Signed by: Abbi Blue MD on 8/11/2021  1:08 PM        Signed:  Yosef Christian.  Lukas Piper, [facility]  09/25/22

## 2022-09-25 NOTE — PROGRESS NOTES
1100 TRANSFER - IN REPORT:    Verbal report received from Valery(name) on Cleone Bamberger  being received from ED(unit) for routine progression of care      Report consisted of patients Situation, Background, Assessment and   Recommendations(SBAR). Information from the following report(s) SBAR, Recent Results, and Cardiac Rhythm Sinus Rhythm  was reviewed with the receiving nurse. Opportunity for questions and clarification was provided. Assessment completed upon patients arrival to unit and care assumed. [address] out to hospitalist because patient converted back to atrial flutter and now is sustaining in a. Fib. HR upper 50's to lower 60's.         1700 Dr. Domitila Chilel reassured RN, even with abnormal rhythm patient is on oral anticoagulation and safe to go home, but patient needs close follow up with his cardiologist Dr. Herbie Torres. Patient agreed to call first thing in the morning.

## 2022-09-25 NOTE — ROUTINE PROCESS
TRANSFER - OUT REPORT:    Verbal report given to 2N(name) on Imani Carroll  being transferred to 2N(unit) for routine progression of care       Report consisted of patients Situation, Background, Assessment and   Recommendations(SBAR). Information from the following report(s) SBAR, ED Summary, MAR, and Recent Results was reviewed with the receiving nurse. Lines:   Peripheral IV 09/24/22 Right Forearm (Active)   Site Assessment Clean, dry, & intact 09/24/22 1808   Phlebitis Assessment 0 09/24/22 1808   Infiltration Assessment 0 09/24/22 1808   Dressing Status Clean, dry, & intact 09/24/22 1808   Dressing Type Transparent 09/24/22 1808   Hub Color/Line Status Blue 09/24/22 1808   Action Taken Blood drawn 09/24/22 1808        Opportunity for questions and clarification was provided.       Patient transported with:   Registered Nurse

## 2022-09-25 NOTE — PROGRESS NOTES
Dear Jordon Guzman,    Your imaging did not identify any abnormality. If you have any questions, please feel free to call our office at [phone].      Niels Verma MD

## 2022-09-25 NOTE — PROGRESS NOTES
Pharmacist Note - Warfarin Dosing  Consult provided for this 81 y.o.male to manage warfarin for chronic PE/DVT with Afib. INR Goal: 2 - 3    Home regimen/ tablet size: ER nurse Eric ) confirmed that the patient is on 2.5 mg Monday and Friday with 3 mg all of there days of the week. Patient states that personal physician instructed him to take 4.5 mg today 9/25 and 9/26 due to a recent INR of 1.7. Drugs that may increase INR: None  Drugs that may decrease INR: None  Other current anticoagulants/ drugs that may increase bleeding risk: None  Risk factors: Age > 65  Daily INR ordered: YES    Recent Labs     09/25/22  0256 09/24/22  1806   HGB 11.7* 13.4   INR 1.8* 1.7*     Date               INR                  Dose  9/24    1.7                  See home dose (last taken on the evening of 9/24, per patient)  9/25                1.8                   5 mg                                                                               Assessment/ Plan:  Warfarin 5 mg today. Pharmacy will continue to monitor daily and adjust therapy as indicated. Please contact the pharmacist at  for outpatient recommendations if needed.

## 2022-09-25 NOTE — PROGRESS NOTES
Care Management - Virtual reviewer communicated change to CM, which reflect outpatient observation order written prior to Written discharge order. Code 44 delivered and given to patient. CM met with the patient and provided to the patient the printed information about his outpatient observation status. All questions were answered, no additional discharge needs identified at this time and patient expects to return to their (home) after discharge today. Copy provided to patient.     WILLIAM Pena

## 2022-09-25 NOTE — ED NOTES
Bedside, Verbal, and Written shift change report given to Edin Mendez RN (oncoming nurse) by Monico Archer (offgoing nurse). Report included the following information SBAR, Kardex, and ED Summary.

## 2022-09-25 NOTE — H&P
[address]  HISTORY AND PHYSICAL    Name:  Faraz Prajapati  MR#:  [id number]  :  1940  ACCOUNT #:  [de-identified]  ADMIT DATE:  2022      The patient was seen, evaluated, and admitted by me on 2022. PRIMARY CARE PHYSICIAN:  Deshaun Dior MD    SOURCE OF INFORMATION:  The patient and review of ED and old electronic medical records. CHIEF COMPLAINT:  Shortness of breath. HISTORY OF PRESENT ILLNESS:  This is a 68-year-old man with past medical history significant for type 2 diabetes; obstructive sleep apnea, on CPAP; gout; anxiety disorder; dyslipidemia; history of atrial fibrillation; and thromboembolism, on Coumadin; presented at the emergency room with shortness of breath. This started about 3 days ago. The shortness of breath is associated with malaise and fatigue. The patient stated that he just felt very slow. The shortness of breath is worse with mild exertion and also worse when the patient is lying down. Denies history of congestive heart failure or COPD. No associated fever, rigors, or chills. The patient came to the emergency room because of worsening symptoms. When the patient arrived at the emergency room, his EKG shows atrial flutter with variable block, chest x-ray shows possible vascular congestion, BNP level was also elevated. He was referred to the hospitalist service for admission. He was last admitted to the hospital in 2021. The patient was admitted for left total hip arthroplasty secondary to osteoarthritis. He was admitted to the orthopedic service. Although the patient has history of AFib, but stated that this has not been a problem for quite some time. The patient also has history of aortic stenosis. He is under the care of cardiologist and sees the cardiologist regularly according to him. PAST MEDICAL HISTORY:  1. Dyslipidemia. 2.  Type 2 diabetes. 3.  Obstructive sleep apnea. 4.  Gout. 5.  Anxiety.   6. Thromboembolism, on Coumadin. 7.  Polymyalgia rheumatica. ALLERGIES:  THE PATIENT IS ALLERGIC TO SULFA. MEDICATIONS:  1. Tylenol 500 mg every 4 hours as needed for pain. 2.  Allopurinol 100 mg daily. 3.  Vitamin C 500 mg daily. 4.  Lipitor 10 mg daily. 5.  BuSpar 5 mg twice daily. 6.  Folic acid 1 mg daily. 7.  Glucophage 500 mg 3 times daily. 8.  Paxil 20 mg twice daily. 9.  Sotalol 80 mg twice daily. 10.  Coumadin, dosage as directed. FAMILY HISTORY:  This was reviewed. His father had heart disease. PAST SURGICAL HISTORY:  This is significant for:  1. Tonsillectomy. 2.  Right shoulder surgery. 3.  Hernia repair. 4.  Left total hip arthroplasty. SOCIAL HISTORY:  The patient is a former smoker. The patient quit tobacco abuse on 01/15/1981. The patient drinks about 7 glasses of wine, 7 cans of beer and 7 shots of liquor a week. REVIEW OF SYSTEMS:  HEAD, EYES, EARS, NOSE, AND THROAT:  No headache, no dizziness, no blurring of vision, no photophobia. RESPIRATORY SYSTEM:  This is positive for shortness. No cough, no hemoptysis. CARDIOVASCULAR SYSTEM:  This is positive for palpitations and orthopnea. No chest pain. GASTROINTESTINAL SYSTEM:  No nausea or vomiting. No diarrhea, no constipation. GENITOURINARY SYSTEM:  No dysuria, no urgency, no frequency. All other systems are reviewed and they are negative. PHYSICAL EXAMINATION:  GENERAL APPEARANCE:  The patient appeared ill, in moderate distress. VITAL SIGNS:  On arrival at the emergency room; temperature 97.8, pulse 75, respiratory rate 20, blood pressure 130/61, oxygen saturation 96%. HEENT:  Head:  Normocephalic, atraumatic. Eyes:  Normal eye movement. No redness, no drainage, no discharge. Ears:  Normal external ears with no evidence of drainage. Nose:  No deformity, no drainage. Mouth and Throat:  No visible oral lesion. NECK:  Neck is supple. Mild JVD. No thyromegaly. CHEST:  Bilateral basilar crackles.   No wheezing. HEART:  Normal S1 and S2, irregularly irregular. Cardiac murmur noted. EXTREMITIES:  No edema. Pulses 2+ bilaterally. MUSCULOSKELETAL SYSTEM:  No evidence of joint deformity or swelling. SKIN:  No active skin lesions seen in the exposed part of the body. PSYCHIATRY:  Normal mood and affect. LYMPHATIC SYSTEM:  No cervical lymphadenopathy. DIAGNOSTIC DATA:  EKG shows atrial flutter with variable AV block. Chest x-ray shows vascular congestion. LABORATORY DATA:  Hematology:  WBC 6.1, hemoglobin 13.4, hematocrit 40.7, platelets 386. Chemistry:  Sodium 137, potassium 4.5, chloride 105, CO2 of 28, glucose 150, BUN 22, creatinine 0.95, calcium 8.9, total bilirubin 0.4, ALT 27, AST 20, alkaline phosphatase 64, total protein 6.8, albumin level 3.7, globulin 3.1. ASSESSMENT  1. Atrial flutter with variable atrioventricular block. 2.  Dyslipidemia. 3.  Type 2 diabetes. 4.  Obstructive sleep apnea, on CPAP. 5.  Gout. 6.  Anxiety disorder. 7.  Suspected acute congestive heart failure. 8.  Thromboembolism. 9.  Alcohol abuse. PLAN:  1. Atrial flutter with variable AV block: We will admit the patient for further evaluation and treatment. We will continue with sotalol. We will check a serial cardiac markers to rule out acute myocardial infarction. We will check TSH level. We will also check urine drug screen. We will await further recommendation from the cardiologist.  2.  Dyslipidemia: We will continue with Lipitor. 3.  Type 2 diabetes: The patient will be placed on sliding scale with insulin coverage. We will check hemoglobin A1c level. We will hold oral agents till the time of discharge from the hospital.  4.  Obstructive sleep apnea, on CPAP:  We will place the patient on CPAP at bedtime. 5.  Gout:  We will continue with allopurinol. 6.  Anxiety disorder: We will resume preadmission medication. 7.  Suspected acute congestive heart failure:   We will give the patient one-time dose of Lasix. We will obtain echocardiogram to determine the ejection fraction and the type of congestive heart failure. We will also await further recommendation from the cardiologist.  We will check serial cardiac markers to rule out acute myocardial infarction. 8.  Thromboembolism:  The patient has DVT of the right leg as well as pulmonary embolism. The patient is on lifelong anticoagulation with Coumadin. We will consult pharmacy for continuation of Coumadin therapy. 9.  Alcohol abuse: We will start the patient on thiamine, folic acid and multivitamin. The patient will also be placed on Valium as needed for withdrawal potential.  If the patient's CIWA score becomes elevated, we will place the patient on CIWA protocol. OTHER ISSUES:  Code status: The patient is on Coumadin, because of that there is no need for DVT prophylaxis. FUNCTIONAL STATUS PRIOR TO ADMISSION:  The patient came from home. The patient is ambulatory with no assistive device. COVID PRECAUTION:  The patient was wearing a face mask.   I was wearing a face mask and gloves for this patient's encounter        Savanna Canada MD      [id number]  D:  09/25/2022 3:40  T:  09/25/2022 4:47  JOB #:  [id number]  CC:  Chemo Santos MD

## 2022-09-25 NOTE — DISCHARGE SUMMARY
Discharge Summary       PATIENT ID: Fidelina Hull  MRN: [id number]   YOB: 1940    DATE OF ADMISSION: 9/24/2022  6:53 PM    DATE OF DISCHARGE: 9/25/2022   PRIMARY CARE PROVIDER: Tatianna Vasquez MD     ATTENDING PHYSICIAN:   DISCHARGING PROVIDER: Gustavo Urban MD    To contact this individual call [phone] and ask the  to page. If unavailable ask to be transferred the Adult Hospitalist Department. CONSULTATIONS: IP CONSULT TO CARDIOLOGY    PROCEDURES/SURGERIES: * No surgery found *    DISCHARGE DIAGNOSES:   [address] [facility] COURSE:     DISCHARGE DIAGNOSES / PLAN:      Parox Afib/aflutter and chronic DVT: on coumadin with cardiology  -per past note: He reports that he is having his INR checked with Dr. John Lea Cardiology).  He reports that he just recently completed holter monitor when seen in an outpatient clinic visit on 3/2022  -tsh mildly elevated, repeat tsh and add free T4 (if mildly abnormal would defer thyroid replacement given marked anxiety/afib/aflutter hx)  --->>>inpatient cardiology consulted who report pt spontaneously converted to sinus rhythm and recommended to resume home meds and followup with his usual outpatient cardiologist Dr. Danitza Rasmussen  -possibly uncontrolled  -has been on paxil chronically, not ideal in older population---resume for now but pending symptoms pt should followup with outpatient provider whether if this should be changed.  -also on buspirone prior to admission  -already on sotalol for reasons above and therefore unable to use propranolol     Hx of Precancers Lesions: he had biopsy with Nereyda's office (Shawna Ricketts) and planning folow up soon     Hx of Gout  -home allopurinol     Hx of OA/hx of arthroplasty, followed by Dr. Eliel Zhu     Hx of ISABELLA, supposedly on cpap at home     Hx of dyslipidemia  -statin     Hx of Acoustic Neuroma  -pt was suppose to have a visit with ENT scheduled for fall, 2022 Diabetes, on metformin at home  A1c 6.8    NOTIFY [address] OF THE FOLLOWING:   Fever over 101 degrees for 24 hours. Chest pain, shortness of breath, fever, chills, nausea, vomiting, diarrhea, change in mentation, falling, weakness, bleeding. Severe pain or pain not relieved by medications, as well as any other signs or symptoms that you may have questions about. FOLLOW UP APPOINTMENTS:    Follow-up Information       Follow up With Specialties Details Why Contact Info    Carey Ramos MD Family Medicine   [address] [facility]      [place]bar[place], [address] [facility] Vascular Surgery, Cardiovascular Disease Physician Call Call to schedule followup appointment with Dr. Humberto Ramírez [place] [facility] [address] [phone]                 DIET: Resume previous diet    ACTIVITY: Activity as tolerated      DISCHARGE MEDICATIONS:  Current Discharge Medication List        CONTINUE these medications which have NOT CHANGED    Details   metFORMIN ER (GLUCOPHAGE XR) 500 mg tablet Take 1 Tablet by mouth three (3) times daily (with meals). Qty: 270 Tablet, Refills: 1    Associated Diagnoses: Type 2 diabetes mellitus without complication, without long-term current use of insulin (HCC)      allopurinoL (ZYLOPRIM) 100 mg tablet Take 0.5 Tablets by mouth daily. Qty: 45 Tablet, Refills: 1    Associated Diagnoses: Elevated uric acid in blood      sotaloL (BETAPACE) 80 mg tablet Take 1 Tablet by mouth two (2) times a day. Qty: 180 Tablet, Refills: 1      PARoxetine (PaxiL) 20 mg tablet Take 1 Tablet by mouth two (2) times a day. Qty: 180 Tablet, Refills: 1    Associated Diagnoses: Anxiety      !! warfarin (COUMADIN) 1 mg tablet Take 2.5mg on Monday and Friday; take 3.5mg all other days. Qty: 60 Tablet, Refills: 1      atorvastatin (LIPITOR) 10 mg tablet Take 1 Tablet by mouth daily. Labs due for refill.   Qty: 90 Tablet, Refills: 0    Comments: Labs due!      glucose blood VI test strips (blood glucose test) strip Use to check fasting and evening blood sugar daily. Patient may use whichever product is covered by insurance. Qty: 180 Strip, Refills: 0    Associated Diagnoses: Type 2 diabetes mellitus without complication, without long-term current use of insulin (HCC)      lancets misc Use to check fasting and evening blood sugar daily. Patient may use whichever product is covered by insurance. Qty: 1 Each, Refills: 11    Associated Diagnoses: Type 2 diabetes mellitus without complication, without long-term current use of insulin ([facility])      busPIRone (BUSPAR) 5 mg tablet Take 1 Tablet by mouth two (2) times a day. Qty: 180 Tablet, Refills: 1    Associated Diagnoses: Anxiety      !! warfarin (Coumadin) 2.5 mg tablet Use as directed  Qty: 180 Tablet, Refills: 1      Blood-Glucose Meter monitoring kit Use to check fasting and evening blood sugar daily. Patient may use whichever product is covered by insurance. Qty: 1 Kit, Refills: 0    Associated Diagnoses: Type 2 diabetes mellitus without complication, without long-term current use of insulin ([facility])      senna-docusate (PERICOLACE) 8.6-50 mg per tablet Take 1 Tablet by mouth two (2) times daily as needed for Constipation. Qty: 60 Tablet, Refills: 0      acetaminophen (TYLENOL) 500 mg tablet Take 1 Tablet by mouth every four (4) hours. Qty: 100 Tablet, Refills: 0      iron 18 mg tab Take 1 mg by mouth. Lactobacillus acidophilus (PROBIOTIC PO) Take 15 billion cell by mouth daily. digestive enzymes cap Take 220 mg by mouth daily. OTHER,NON-FORMULARY, Take 10 mg by mouth daily. HEMP EXTRACT GUMMIES      b complex-folic acid 0.4 mg tablet Take 1 Tab by mouth daily. Comp. Stocking,Knee,Regular,Lrg (Relief Knee Open Toe Stocking) misc Use daily for lower extremity swelling, Size large 30-40mm, Model # [id number]  Qty: 2 Each, Refills: 1    Associated Diagnoses: Chronic deep vein thrombosis (DVT) of distal vein of right lower extremity (HCC)      ascorbic acid, vitamin C, (VITAMIN C) 500 mg tablet Take  by mouth.      gluc case/chondro case A/vit C/Mn (GLUCOSAMINE 1500 COMPLEX PO) Take  by mouth. folic acid (FOLVITE) 1 mg tablet Take  by mouth daily. cholecalciferol (VITAMIN D3) (2,000 UNITS /50 MCG) cap capsule Take 2,000 Units by mouth daily. magnesium 250 mg tab Take  by mouth.      calcium carbonate (CALCIUM 500 PO) Take  by mouth. turmeric (CURCUMIN) 1 Tablet by Does Not Apply route daily. !! - Potential duplicate medications found. Please discuss with provider. DISPOSITION:    Home With:   OT  PT  HH  RN       Long term SNF/Inpatient Rehab   x Independent/assisted living    Hospice    Other:       PATIENT CONDITION AT DISCHARGE:     Functional status    Poor     Deconditioned    x Independent      Cognition    x Lucid     Forgetful     Dementia      Catheters/lines (plus indication)    Gruber     PICC     PEG    x None      Code status   x  Full code     DNR      PHYSICAL EXAMINATION AT DISCHARGE:  General:          Alert, cooperative, no distress, appears stated age. HEENT:           Atraumatic, anicteric sclerae  Neck:               Supple, symmetrical  Lungs:             Clear to auscultation bilaterally. No Wheezing or Rhonchi. No rales. Chest wall:      No tenderness  No Accessory muscle use. Heart:              Regular  rhythm,  No  rubs  Abdomen:        Soft, non-tender. Not distended. Bowel sounds normal  Extremities:     No cyanosis. No clubbing,    Skin:                Not pale. Not Jaundiced  No rashes   Psych:             Not anxious or agitated.   Neurologic:      Alert, moves all extremities, answers questions appropriately and responds to commands           Greater than 35 minutes were spent with the patient on counseling and coordination of care    Signed:   Devin Huizar MD  9/25/2022  2:04 PM

## 2022-09-26 ENCOUNTER — PATIENT MESSAGE (OUTPATIENT)
Dept: FAMILY MEDICINE CLINIC | Age: 82
End: 2022-09-26

## 2022-09-26 ENCOUNTER — PATIENT OUTREACH (OUTPATIENT)
Dept: CASE MANAGEMENT | Age: 82
End: 2022-09-26

## 2022-09-26 NOTE — PROGRESS NOTES
Left voicemail stating my name, CTN with [facility], date and time of call, and return telephone number. Called pt's emergency contact who reports that pt is at a physician's visit. Pt's wife reports that pt is following up with Dr. Juana Page. .  Will call pt later today    Care Transitions Initial Call    Call within 2 business days of discharge: Yes     Patient: Ana Damian Patient : 1940 MRN: [id number]    Last Discharge  [address]       Date Complaint Diagnosis Description Type Department Provider    22 Shortness of Breath Atrial flutter, unspecified type ([facility] [address] . .. ED to Hosp-Admission (Discharged) (ADMIT) Sarita Soriano MD; Radha Peterson, ... Was this an external facility discharge? No Discharge Facility: na    Challenges to be reviewed by the provider   Additional needs identified to be addressed with provider: no  none         Method of communication with provider : none    Advance Care Planning:   Does patient have an Advance Directive: not on file. Inpatient Readmission Risk score: Unplanned Readmit Risk Score: 10.5    Was this a readmission? no   Patient stated reason for the admission: pt reports that he felt \"slow\" for 3 days and PCP and wife encouraged him to go to the ED. Patients top risk factors for readmission: medical condition-     Interventions to address risk factors: Obtained and reviewed discharge summary and/or continuity of care documents    Care Transition Nurse (CTN) contacted the patient by telephone to perform post hospital discharge assessment. Verified name and  with patient as identifiers. Provided introduction to self, and explanation of the CTN role. CTN reviewed discharge instructions, medical action plan and red flags with patient who verbalized understanding. Were discharge instructions available to patient? yes.  Reviewed appropriate site of care based on symptoms and resources available to patient including: PCP, Specialist, and When to call 911. Patient given an opportunity to ask questions and does not have any further questions or concerns at this time. The patient agrees to contact the PCP office for questions related to their healthcare. Medication reconciliation was performed with patient, who verbalizes understanding of administration of home medications. Advised obtaining a 90-day supply of all daily and as-needed medications. Referral to Pharm D needed: no     Discussed follow-up appointments. If no appointment was previously scheduled, appointment scheduling offered:  na . Is follow up appointment scheduled within 7 days of discharge? yes. [facility] follow up appointment(s): No future appointments. Non-[facility] follow up appointment(s): Dr. Spike Jennings on 9/26/2022 and on 10/6/2022    Plan for follow-up call in 5-7 days based on severity of symptoms and risk factors. Plan for next call:  will review any symptoms and follow up appt with PCP    CTN provided contact information for future needs. Goals Addressed                   This Visit's Progress     Prevent complications post hospitalization. 9/26/2022  Pt confirmed follow up appt with Dr. Spike Jennings today, 9/26/2022. Pt verified medications. Pt reports that he will follow up with [facility] for a heart monitor on 10/6/2022 for one week and meet with EP physician. Pt reports no symptoms with A Flutter other than feeling tired for about 3 days. Pt reminded to seek help as needed. Reviewed red flags with pt and pt states FAST and call 911. Pt is agreeable to a call in 7 days to review any changes in symptoms such as increased SOB or fatigue.   Lord Ki KO [address] [facility], Care Transitions Nurse, [phone]

## 2022-10-03 NOTE — TELEPHONE ENCOUNTER
Called and discussed further with jimbo. He has visit this week with cardiology for holter monitor and he has visit with Dr. Saintclair Garret next week for consultation about ablation.      Char Lee MD

## 2022-10-03 NOTE — TELEPHONE ENCOUNTER
Karolyn Rasmussen 9/26/2022 4:55 PM EDT      ----- Message -----  From: Aaliyah Rodriguez  Sent: 9/26/2022 3:53 PM EDT  [facility]  Subject: Ultrasound and A-Flutter     Hello Dr. Belva Hamman--    Thanks again for your help on Saturday. I hope that [facility] gives you access to all that transpired for me at [facility] over the weekend. They took good care of me. After my visit to [facility], I saw Dr. Jennifer Gallardo this morning. She has me set up for a conversation with Dr. Zack Valdez for consultation about ablation for A-flutter, as well as carrying a heart monitor 10/6-13. I have a followup appointment with her scheduled in December. Is there anything I need to do with you in the meantime (besides maybe scheduling a weekend fishing trip)?     Best regards, Aaliyah Rodriguez

## 2022-10-04 ENCOUNTER — PATIENT OUTREACH (OUTPATIENT)
Dept: CASE MANAGEMENT | Age: 82
End: 2022-10-04

## 2022-10-04 NOTE — PROGRESS NOTES
Goals        Prevent complications post hospitalization. 9/26/2022  Pt confirmed follow up appt with Dr. Catracho Alberts today, 9/26/2022. Pt verified medications. Pt reports that he will follow up with [facility] Cardiology for a heart monitor on 10/6/2022 for one week and meet with EP physician. Pt reports no symptoms with A Flutter other than feeling tired for about 3 days. Pt reminded to seek help as needed. Reviewed red flags with pt and pt states FAST and call 911. Pt is agreeable to a call in 7 days to review any changes in symptoms such as increased SOB or fatigue. Sonali Whitehead RN [address] [facility], Care Transitions Nurse, [phone]     10/4/2022  Pt reports that he continues with some SOB and continues to move slowly. Pt confirmed that he has been in contact with PCP who, pt reports, is working with Dr. Catracho Alberts, cardiologist.  Confirmed with pt that he will have heart monitor placed on 10/6/2022 for one week and follow up with EP physician on 10/11/2022. Dr Margarita Sun? Pt is agreeable to a call in 8 days to review symptoms and EP follow up. Pt reminded to seek emergent help is SOB worsens. We discussed that pt can explore what his pulse feels like to help him with education about arrhythmia.   Sonali Whitehead RN [address] [facility], Care Transitions Nurse, [phone]

## 2022-10-11 RX ORDER — ATORVASTATIN CALCIUM 10 MG/1
10 TABLET, FILM COATED ORAL DAILY
Qty: 90 TABLET | Refills: 3 | Status: SHIPPED | OUTPATIENT
Start: 2022-10-11

## 2022-10-11 NOTE — TELEPHONE ENCOUNTER
Request for refill. Many labs completed 9/2022, except lipid per ER/hospitalization. Thanks, Tonie    Last Visit: VV 3/21/22 MD Eder Jimenez, lipid 11/11/2020? Next Appointment: None  Previous Refill Encounter(s): 7/13/22 90    Requested Prescriptions     Pending Prescriptions Disp Refills    atorvastatin (LIPITOR) 10 mg tablet 90 Tablet 0     Sig: Take 1 Tablet by mouth daily. Lab due! For [address] [facility] in place:   Recommendation Provided To:    Intervention Detail: New Rx: 1, reason: Patient Preference  Gap Closed?:   Intervention Accepted By:   Time Spent (min): 5

## 2022-10-14 ENCOUNTER — PATIENT OUTREACH (OUTPATIENT)
Dept: CASE MANAGEMENT | Age: 82
End: 2022-10-14

## 2022-10-14 NOTE — PROGRESS NOTES
Goals        Prevent complications post hospitalization. 9/26/2022  Pt confirmed follow up appt with Dr. Siri Jeff today, 9/26/2022. Pt verified medications. Pt reports that he will follow up with [facility] for a heart monitor on 10/6/2022 for one week and meet with EP physician. Pt reports no symptoms with A Flutter other than feeling tired for about 3 days. Pt reminded to seek help as needed. Reviewed red flags with pt and pt states FAST and call 911. Pt is agreeable to a call in 7 days to review any changes in symptoms such as increased SOB or fatigue. Duong Luna RN [address] [facility] Transitions Nurse, [phone]     10/4/2022  Pt reports that he continues with some SOB and continues to move slowly. Pt confirmed that he has been in contact with PCP who, pt reports, is working with Dr. Siri Jeff, cardiologist.  Confirmed with pt that he will have heart monitor placed on 10/6/2022 for one week and follow up with EP physician on 10/11/2022. Dr Armando Ruiz? Pt is agreeable to a call in 8 days to review symptoms and EP follow up. Pt reminded to seek emergent help is SOB worsens. We discussed that pt can explore what his pulse feels like to help him with education about arrhythmia. Duong Luna RN [address] [facility] Transitions Nurse, [phone]     10/14/2022  Pt reports that he is wearing a holter monitor and may be scheduled for a cardiac ablation in December depending on results from monitor. Pt reports no further episodes of extreme fatigue or SOB. Pt states understanding to seek emergent help is baseline health status changes. Pt is agreeable to a billie ella 7 days to review changes in baseline health or episodes of SOB.   Duong Luna RN [address] [facility], Care Transitions Nurse, [phone]

## 2022-10-25 DIAGNOSIS — E11.9 TYPE 2 DIABETES MELLITUS WITHOUT COMPLICATION, WITHOUT LONG-TERM CURRENT USE OF INSULIN (HCC): ICD-10-CM

## 2022-10-25 DIAGNOSIS — F41.9 ANXIETY: ICD-10-CM

## 2022-10-25 RX ORDER — IBUPROFEN 200 MG
CAPSULE ORAL
Qty: 200 STRIP | Refills: 1 | Status: SHIPPED | OUTPATIENT
Start: 2022-10-25

## 2022-10-25 RX ORDER — BUSPIRONE HYDROCHLORIDE 5 MG/1
5 TABLET ORAL 2 TIMES DAILY
Qty: 180 TABLET | Refills: 1 | Status: SHIPPED | OUTPATIENT
Start: 2022-10-25

## 2022-10-25 NOTE — TELEPHONE ENCOUNTER
Last Visit: 3/4/22 MD Marisol Avendaño  Next Appointment: None  Previous Refill Encounter(s):   Buspirone 3/4/22 180 + 1  Test strips 7/13/22 180    Requested Prescriptions     Pending Prescriptions Disp Refills    busPIRone (BUSPAR) 5 mg tablet 180 Tablet 1     Sig: Take 1 Tablet by mouth two (2) times a day. glucose blood VI test strips (blood glucose test) strip 200 Strip 1     Sig: Use to check fasting and evening blood sugar daily. Patient may use whichever product is covered by insurance. For [address] [facility] in place:   Recommendation Provided To:    Intervention Detail: New Rx: 2, reason: Patient Preference  Gap Closed?:   Intervention Accepted By:   Time Spent (min): 10

## 2022-10-26 ENCOUNTER — PATIENT OUTREACH (OUTPATIENT)
Dept: CASE MANAGEMENT | Age: 82
End: 2022-10-26

## 2022-11-08 ENCOUNTER — TRANSCRIBE ORDER (OUTPATIENT)
Dept: SCHEDULING | Age: 82
End: 2022-11-08

## 2022-11-08 DIAGNOSIS — H83.2X1 LABYRINTHINE DYSFUNCTION, RIGHT EAR: Primary | ICD-10-CM

## 2022-11-08 DIAGNOSIS — D33.3: ICD-10-CM

## 2022-11-08 DIAGNOSIS — H90.3 SENSORINEURAL HEARING LOSS, BILATERAL: ICD-10-CM

## 2022-11-30 ENCOUNTER — TELEPHONE (OUTPATIENT)
Dept: SLEEP MEDICINE | Age: 82
End: 2022-11-30

## 2022-12-09 NOTE — PROGRESS NOTES
[address] [facility] [place]. [place], [address]   Tel.  [phone]   Fax. [address] [facility] [address]   [place], [address] [facility]   Tel.  [phone]   Fax. [phone] [address]   Tel.  [phone]   Fax. [phone]     Luz Storey (: 1940) is a [de-identified] y.o. male, established patient, seen for positive airway pressure follow-up and evaluation. He was last seen by Dr. Josafat Lange on 3/2020, prior notes reviewed in detail. In lab sleep test  [facility]) showed AHI of 52.3/hr with a lowest SaO2 of 70%. He is seen today for follow up. ASSESSMENT/PLAN:    ICD-10-CM ICD-9-CM    1. ISABELLA (obstructive sleep apnea)  G47.33 327.23 AMB SUPPLY ORDER      2. Primary hypertension  I10 401.9       3. BMI 27.0-27.9,adult  Z68.27 V85.23         AHI = 52.3 (, external lab). On APAP :  6-18 cmH2O. Set up ? Marlon Pierre He is adherent with PAP therapy and PAP continues to benefit patient and remains necessary for control of his sleep apnea. Sleep Apnea - continue on current pressures. Orders Placed This Encounter    AMB SUPPLY ORDER     Diagnosis: (G47.33) ISABELLA (obstructive sleep apnea)  (primary encounter diagnosis)     Replacement Supplies for Positive Airway Pressure Therapy Device:   Duration of need: 99 months.  Nasal Pillows Combo Mask (Replace) 2 per month.  Nasal Pillows (Replace) 2 per month.  Nasal Cushion (Replace) 2 per month.  Nasal Interface Mask 1 every 3 months.  Headgear 1 every 6 months.  Positive Airway Pressure chinstrap 1 every 6 months.  Tubing with heating element 1 every 3 months.  Filter(s) Disposable 2 per month.  Filter(s) Non-Disposable 1 every 6 months. .   [address] [facility] for Humidifier (Replace) 1 every 6 months. HAO Cedillo NPI: [id number]    Electronically signed.  Date:- 22     * Counseling was provided regarding the importance of regular PAP use with emphasis on ensuring sufficient total sleep time, proper sleep hygiene, and safe driving. * Re-enforced proper and regular cleaning for the device. * He was asked to contact our office for any problems regarding PAP therapy. 2. Hypertension -  continue on his current regimen, he will continue to monitor his BP and follow up with his PMD for reevaluation/adjustment of medications if warranted. I have reviewed the relationship between hypertension as it relates to sleep-disordered breathing. 3. Recommended a dedicated weight loss program through appropriate diet and exercise regimen as significant weight reduction has been shown to reduce severity of obstructive sleep apnea. SUBJECTIVE/OBJECTIVE:    He  is seen today for follow up on PAP device and reports no problems using the device. The following concerns identified:    Drowsiness no Problems exhaling no   Snoring no Forget to put on no   Mask Comfortable yes Can't fall asleep no   Dry Mouth no Mask falls off no   Air Leaking no Frequent awakenings no     He admits that his sleep has significantly improved on PAP therapy using nasal mask and heated tubing. Review of device download indicated:  Auto pressure: 6-18 cmH2O; Max Pressure: 12.8 cmH2O;  95th percentile Pressure: 11.5 cmH2O   95th Percentile Leak: 24.1 L/Min     % Used Days >= 4 hours: 98. Avg hours used:  8 hours. Therapy Apnea Index averaged over PAP use: 4.6 /hr which reflects improved sleep breathing condition. George Sleepiness Score: 11 and Modified F.O.S.Q. Score Total / 2: 19.5 which reflects improved sleep quality over therapy time. Sleep Review of Systems: notable for Negative difficulty falling asleep; Negative awakenings at night; Negative early morning headaches; Negative memory problems; Negative concentration issues;  Negative chest pain; Negative shortness of breath; Negative significant joint pain at night; Negative significant muscle pain at night; Negative rashes or itching; Negative heartburn; Negative significant mood issues    Visit Vitals  /72 (BP 1 Location: Left upper arm, BP Patient Position: Sitting, BP Cuff Size: Adult)   Pulse 61   Temp 97.7 °F (36.5 °C) (Temporal)   Ht 5' 10\" (1.778 m)   Wt 190 lb 12.8 oz (86.5 kg)   BMI 27.38 kg/m²        General:   Alert, oriented, not in acute distress   Eyes:  Anicteric Sclerae; no obvious strabismus   Nose:  No obvious nasal septum deviation    Neck:   Midline trachea   Chest/Lungs:  Symmetrical lung expansion, clear lung fields on auscultation    CVS:  Normal rate, regular rhythm,  no JVD   Extremities:  No obvious rashes, no edema    Neuro:  No focal deficits; No obvious tremor    Psych:  Normal affect,  normal countenance     Patient's phone number [phone] (home)  was reviewed and confirmed for accuracy. He gives permission for messages regarding results and appointments to be left at that number. On this date 12/12/2022 I have spent 20 minutes reviewing previous notes, test results and face to face with the patient discussing the diagnosis and importance of compliance with the treatment plan as well as documenting on the day of the visit. An electronic signature was used to authenticate this note.     -- Kat Lagos NP, [facility]  12/12/22

## 2022-12-12 ENCOUNTER — DOCUMENTATION ONLY (OUTPATIENT)
Dept: SLEEP MEDICINE | Age: 82
End: 2022-12-12

## 2022-12-12 ENCOUNTER — PATIENT MESSAGE (OUTPATIENT)
Dept: SLEEP MEDICINE | Age: 82
End: 2022-12-12

## 2022-12-12 ENCOUNTER — OFFICE VISIT (OUTPATIENT)
Dept: SLEEP MEDICINE | Age: 82
End: 2022-12-12
Payer: MEDICARE

## 2022-12-12 VITALS
SYSTOLIC BLOOD PRESSURE: 125 MMHG | HEART RATE: 61 BPM | BODY MASS INDEX: 27.32 KG/M2 | DIASTOLIC BLOOD PRESSURE: 72 MMHG | HEIGHT: 70 IN | TEMPERATURE: 97.7 F | WEIGHT: 190.8 LBS

## 2022-12-12 DIAGNOSIS — I10 PRIMARY HYPERTENSION: ICD-10-CM

## 2022-12-12 DIAGNOSIS — G47.33 OSA (OBSTRUCTIVE SLEEP APNEA): Primary | ICD-10-CM

## 2022-12-12 PROCEDURE — 3078F DIAST BP <80 MM HG: CPT | Performed by: NURSE PRACTITIONER

## 2022-12-12 PROCEDURE — G8536 NO DOC ELDER MAL SCRN: HCPCS | Performed by: NURSE PRACTITIONER

## 2022-12-12 PROCEDURE — G8417 CALC BMI ABV UP PARAM F/U: HCPCS | Performed by: NURSE PRACTITIONER

## 2022-12-12 PROCEDURE — G8432 DEP SCR NOT DOC, RNG: HCPCS | Performed by: NURSE PRACTITIONER

## 2022-12-12 PROCEDURE — 99213 OFFICE O/P EST LOW 20 MIN: CPT | Performed by: NURSE PRACTITIONER

## 2022-12-12 PROCEDURE — 1101F PT FALLS ASSESS-DOCD LE1/YR: CPT | Performed by: NURSE PRACTITIONER

## 2022-12-12 PROCEDURE — 3074F SYST BP LT 130 MM HG: CPT | Performed by: NURSE PRACTITIONER

## 2022-12-12 PROCEDURE — 1123F ACP DISCUSS/DSCN MKR DOCD: CPT | Performed by: NURSE PRACTITIONER

## 2022-12-12 PROCEDURE — G8427 DOCREV CUR MEDS BY ELIG CLIN: HCPCS | Performed by: NURSE PRACTITIONER

## 2022-12-12 NOTE — TELEPHONE ENCOUNTER
Compliance letter sent to patient via [employer].      AILYN Stephens-BC, [facility]   Nurse Practitioner  [address] [facility] [address] [facility]

## 2022-12-12 NOTE — PATIENT INSTRUCTIONS
[address] [place]. [place], [address]  Tel.  [phone]  Fax. [address] [facility] 60  [place], [address] [facility]  Tel.  [phone]  Fax. [phone] [address]  Tel.  [phone]  Fax. [phone]     Learning About CPAP for Sleep Apnea  What is CPAP? CPAP is a small machine that you use at home every night while you sleep. It increases air pressure in your throat to keep your airway open. When you have sleep apnea, this can help you sleep better so you feel much better. CPAP stands for \"continuous positive airway pressure. \"  The CPAP machine will have one of the following:  A mask that covers your nose and mouth  Prongs that fit into your nose  A mask that covers your nose only, the most common type. This type is called NCPAP. The N stands for \"nasal.\"  Why is it done? CPAP is usually the best treatment for obstructive sleep apnea. It is the first treatment choice and the most widely used. Your doctor may suggest CPAP if you have: Moderate to severe sleep apnea. Sleep apnea and coronary artery disease (CAD) or heart failure. How does it help? CPAP can help you have more normal sleep, so you feel less sleepy and more alert during the daytime. CPAP may help keep heart failure or other heart problems from getting worse. NCPAP may help lower your blood pressure. If you use CPAP, your bed partner may also sleep better because you are not snoring or restless. What are the side effects? Some people who use CPAP have:  A dry or stuffy nose and a sore throat. Irritated skin on the face. Sore eyes. Bloating. If you have any of these problems, work with your doctor to fix them. Here are some things you can try:  Be sure the mask or nasal prongs fit well. See if your doctor can adjust the pressure of your CPAP. If your nose is dry, try a humidifier.   If your nose is runny or stuffy, try decongestant medicine or a steroid nasal spray. If these things do not help, you might try a different type of machine. Some machines have air pressure that adjusts on its own. Others have air pressures that are different when you breathe in than when you breathe out. This may reduce discomfort caused by too much pressure in your nose. Where can you learn more? Go to [employer]  Enter Davie Alberto in the search box to learn more about \"Learning About CPAP for Sleep Apnea. \"   © 0910-6616 Healthwise, Incorporated. Care instructions adapted under license by [employer] (which disclaims liability or warranty for this information). This care instruction is for use with your licensed healthcare professional. If you have questions about a medical condition or this instruction, always ask your healthcare professional. [address] any warranty or liability for your use of this information. Content Version: 4.4.33873; Last Revised: January 11, 2010  PROPER SLEEP HYGIENE    What to avoid  Do not have drinks with caffeine, such as coffee or black tea, for 8 hours before bed. Do not smoke or use other types of tobacco near bedtime. Nicotine is a stimulant and can keep you awake. Avoid drinking alcohol late in the evening, because it can cause you to wake in the middle of the night. Do not eat a big meal close to bedtime. If you are hungry, eat a light snack. Do not drink a lot of water close to bedtime, because the need to urinate may wake you up during the night. Do not read or watch TV in bed. Use the bed only for sleeping and sexual activity. What to try  Go to bed at the same time every night, and wake up at the same time every morning. Do not take naps during the day. Keep your bedroom quiet, dark, and cool. Get regular exercise, but not within 3 to 4 hours of your bedtime. .  Sleep on a comfortable pillow and mattress.   If watching the clock makes you anxious, turn it facing away from you so you cannot see the time. If you worry when you lie down, start a worry book. Well before bedtime, write down your worries, and then set the book and your concerns aside. Try meditation or other relaxation techniques before you go to bed. If you cannot fall asleep, get up and go to another room until you feel sleepy. Do something relaxing. Repeat your bedtime routine before you go to bed again. Make your house quiet and calm about an hour before bedtime. Turn down the lights, turn off the TV, log off the computer, and turn down the volume on music. This can help you relax after a busy day. Drowsy Driving: The [employer] cites drowsiness as a causing factor in more than 786,888 police reported crashes annually, resulting in 76,000 injuries and 1,500 deaths. Other surveys suggest 55% of people polled have driven while drowsy in the past year, 23% had fallen asleep but not crashed, 3% crashed, and 2% had and accident due to drowsy driving. Who is at risk? Young Drivers: One study of drowsy driving accidents states that 55% of the drivers were under 25 years. Of those, 75% were male. Shift Workers and Travelers: People who work overnight or travel across time zones frequently are at higher risk of experiencing Circadian Rhythm Disorders. They are trying to work and function when their body is programed to sleep. Sleep Deprived: Lack of sleep has a serious impact on your ability to pay attention or focus on a task. Consistently getting less than the average of 8 hours your body needs creates partial or cumulative sleep deprivation. Untreated Sleep Disorders: Sleep Apnea, Narcolepsy, R.L.S., and other sleep disorders (untreated) prevent a person from getting enough restful sleep. This leads to excessive daytime sleepiness and increases the risk for drowsy driving accidents by up to 7 times.   Medications / Alcohol: Even over the counter medications can cause drowsiness. Medications that impair a drivers attention should have a warning label. Alcohol naturally makes you sleepy and on its own can cause accidents. Combined with excessive drowsiness its effects are amplified. Signs of Drowsy Driving:   * You don't remember driving the last few miles   * You may drift out of your mili   * You are unable to focus and your thoughts wander   * You may yawn more often than normal   * You have difficulty keeping your eyes open / nodding off   * Missing traffic signs, speeding, or tailgating  Prevention-   Good sleep hygiene, lifestyle and behavioral choices have the most impact on drowsy driving. There is no substitute for sleep and the average person requires 8 hours nightly. If you find yourself driving drowsy, stop and sleep. Consider the sleep hygiene tips provided during your visit as well. Medication Refill Policy: Refills for all medications require 1 week advance notice. Please have your pharmacy fax a refill request. We are unable to fax, or call in \"controled substance\" medications and you will need to pick these prescriptions up from our office. NovoED Activation    Thank you for requesting access to NovoED. Please follow the instructions below to securely access and download your online medical record. NovoED allows you to send messages to your doctor, view your test results, renew your prescriptions, schedule appointments, and more. How Do I Sign Up? In your internet browser, go to [email or web address]. Click on the First Time User? Click Here link in the Sign In box. You will see the New Member Sign Up page. Enter your NovoED Access Code exactly as it appears below. You will not need to use this code after youve completed the sign-up process. If you do not sign up before the expiration date, you must request a new code. NovoED Access Code:  Activation code not generated  Current NovoED Status: Active (This is the date your Graffiti access code will )    Enter the last four digits of your Social Security Number (xxxx) and Date of Birth (mm/dd/yyyy) as indicated and click Submit. You will be taken to the next sign-up page. Create a Graffiti ID. This will be your Graffiti login ID and cannot be changed, so think of one that is secure and easy to remember. Create a Graffiti password. You can change your password at any time. Enter your Password Reset Question and Answer. This can be used at a later time if you forget your password. Enter your e-mail address. You will receive e-mail notification when new information is available in [address] Click Sign Up. You can now view and download portions of your medical record. Click the netomat link to download a portable copy of your medical information. Additional Information    If you have questions, please call [phone]. Remember, Graffiti is NOT to be used for urgent needs. For medical emergencies, dial 911.

## 2022-12-20 RX ORDER — WARFARIN 2.5 MG/1
TABLET ORAL
Qty: 180 TABLET | Refills: 0 | Status: SHIPPED | OUTPATIENT
Start: 2022-12-20

## 2022-12-20 NOTE — TELEPHONE ENCOUNTER
Per Daphnie Winchester [address], patient is requesting refill of warfarin 2.5mg and metformin. (Metformin just filled 12/18/22 for 90 d/s- Too soon). Thanks, Denis Harris    Last Visit: VV 3/21/22 MD [facility]  Next Appointment: None- Appt due? Previous Refill Encounter(s): 12/9/21 180 + 1    Requested Prescriptions     Pending Prescriptions Disp Refills    warfarin (Coumadin) 2.5 mg tablet 180 Tablet 0     Sig: Use as directed   For Pharmacy Admin Tracking Only    Program: Medication Refill  CPA in place:   Recommendation Provided To:    Intervention Detail: Discontinued Rx: 1, reason: Duplicate Therapy and New Rx: 1, reason: Patient Preference  Gap Closed?:   Intervention Accepted By:   Time Spent (min): 5

## 2023-02-09 DIAGNOSIS — F41.9 ANXIETY: ICD-10-CM

## 2023-02-09 RX ORDER — BUSPIRONE HYDROCHLORIDE 5 MG/1
5 TABLET ORAL 2 TIMES DAILY
Qty: 180 TABLET | Refills: 1 | Status: SHIPPED | OUTPATIENT
Start: 2023-02-09

## 2023-02-20 DIAGNOSIS — F41.9 ANXIETY: ICD-10-CM

## 2023-02-20 RX ORDER — PAROXETINE HYDROCHLORIDE 20 MG/1
20 TABLET, FILM COATED ORAL 2 TIMES DAILY
Qty: 180 TABLET | Refills: 1 | Status: SHIPPED | OUTPATIENT
Start: 2023-02-20

## 2023-02-20 NOTE — TELEPHONE ENCOUNTER
Last Visit: VV 3/21/22 MD Michelle Cooper  Next Appointment: 2/27/23 MD Michelle Cooper  Previous Refill Encounter(s): 8/22/22 180 + 1    Requested Prescriptions     Pending Prescriptions Disp Refills    PARoxetine (PaxiL) 20 mg tablet 180 Tablet 1     Sig: Take 1 Tablet by mouth two (2) times a day. For Pharmacy Admin Tracking Only    Program: Medication Refill  CPA in place:   Recommendation Provided To:    Intervention Detail: New Rx: 1, reason: Patient Preference  Intervention Accepted By:   Hank Everett Closed?:   Time Spent (min): 5

## 2023-02-24 RX ORDER — WARFARIN 1 MG/1
TABLET ORAL
Qty: 60 TABLET | Refills: 1 | Status: SHIPPED | OUTPATIENT
Start: 2023-02-24

## 2023-02-24 NOTE — TELEPHONE ENCOUNTER
Last Visit: 3/4/22 MD Julio Bustillo  Next Appointment: 2/27/23 MD Julio Bustillo  Previous Refill Encounter(s): 8/17/22 60 + 1    Requested Prescriptions     Pending Prescriptions Disp Refills    warfarin (COUMADIN) 1 mg tablet 60 Tablet 1     Sig: Take 2.5mg on Monday and Friday; take 3.5mg all other days. For Pharmacy Admin Tracking Only    Program: Medication Refill  CPA in place:   Recommendation Provided To:    Intervention Detail: New Rx: 1, reason: Patient Preference  Intervention Accepted By:   Renee Garcia Closed?:   Time Spent (min): 5

## 2023-02-27 ENCOUNTER — OFFICE VISIT (OUTPATIENT)
Dept: FAMILY MEDICINE CLINIC | Age: 83
End: 2023-02-27
Payer: MEDICARE

## 2023-02-27 VITALS
TEMPERATURE: 97.2 F | RESPIRATION RATE: 18 BRPM | WEIGHT: 192.8 LBS | HEIGHT: 70 IN | HEART RATE: 62 BPM | OXYGEN SATURATION: 97 % | BODY MASS INDEX: 27.6 KG/M2 | SYSTOLIC BLOOD PRESSURE: 100 MMHG | DIASTOLIC BLOOD PRESSURE: 62 MMHG

## 2023-02-27 DIAGNOSIS — Z79.01 ANTICOAGULATED ON COUMADIN: ICD-10-CM

## 2023-02-27 DIAGNOSIS — R19.5 LOOSE STOOLS: Primary | ICD-10-CM

## 2023-02-27 DIAGNOSIS — Z99.89 OSA ON CPAP: ICD-10-CM

## 2023-02-27 DIAGNOSIS — F41.9 ANXIETY: ICD-10-CM

## 2023-02-27 DIAGNOSIS — E11.9 TYPE 2 DIABETES MELLITUS WITHOUT COMPLICATION, WITHOUT LONG-TERM CURRENT USE OF INSULIN (HCC): ICD-10-CM

## 2023-02-27 DIAGNOSIS — D33.3 ACOUSTIC NEUROMA (HCC): ICD-10-CM

## 2023-02-27 DIAGNOSIS — G47.33 OSA ON CPAP: ICD-10-CM

## 2023-02-27 DIAGNOSIS — I48.0 PAROXYSMAL ATRIAL FIBRILLATION (HCC): ICD-10-CM

## 2023-02-27 DIAGNOSIS — E78.00 HYPERCHOLESTEREMIA: ICD-10-CM

## 2023-02-27 DIAGNOSIS — M1A.9XX0 CHRONIC GOUT WITHOUT TOPHUS, UNSPECIFIED CAUSE, UNSPECIFIED SITE: ICD-10-CM

## 2023-02-27 DIAGNOSIS — Z12.5 SCREENING FOR MALIGNANT NEOPLASM OF PROSTATE: ICD-10-CM

## 2023-02-27 PROCEDURE — 1101F PT FALLS ASSESS-DOCD LE1/YR: CPT | Performed by: FAMILY MEDICINE

## 2023-02-27 PROCEDURE — G8427 DOCREV CUR MEDS BY ELIG CLIN: HCPCS | Performed by: FAMILY MEDICINE

## 2023-02-27 PROCEDURE — 99213 OFFICE O/P EST LOW 20 MIN: CPT | Performed by: FAMILY MEDICINE

## 2023-02-27 PROCEDURE — G0463 HOSPITAL OUTPT CLINIC VISIT: HCPCS | Performed by: FAMILY MEDICINE

## 2023-02-27 PROCEDURE — G8536 NO DOC ELDER MAL SCRN: HCPCS | Performed by: FAMILY MEDICINE

## 2023-02-27 PROCEDURE — 1123F ACP DISCUSS/DSCN MKR DOCD: CPT | Performed by: FAMILY MEDICINE

## 2023-02-27 PROCEDURE — G8417 CALC BMI ABV UP PARAM F/U: HCPCS | Performed by: FAMILY MEDICINE

## 2023-02-27 PROCEDURE — G8510 SCR DEP NEG, NO PLAN REQD: HCPCS | Performed by: FAMILY MEDICINE

## 2023-02-27 NOTE — PROGRESS NOTES
Caro Dickerson  [de-identified] y.o. male  1940  [address]  [id number]     [address] [facility]       Encounter Date: 2/27/2023           Established Patient Visit Note: Destiny Chew MD    Reason for Appointment:  Chief Complaint   Patient presents with    Abdominal Pain     Abd pain          History of Present Illness:  History provided by {Relatives - adult:5061}    Caro Dickerson is a [de-identified] y.o. male who presents to clinic today for:     Labs ordered 7/6/22, but these have not yet been drawn. Parox Afib/aflutter s/p Ablation: followed by cardiology- Dr. Bolivar Lazcano and Dr. Dany Martel*** [facility]). Chronic DVT: on coumadin that is monitored through cardiology. Hx of Precancers Lesions: followed by dermatology (Dr. Christie Yusuf office- Deondre Tellez. He reports last visit was two weeks ago for biopsy of right ear that he reports as benign. DM2: He reports FBG as 111 this morning. Doing well with metformin  S/p Left Hip Arthroplasty: he reports that this is doing well. 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 Aris Loera) in fall, 2022. MRI ordered on 11/8/22 by ENT, but this has not yet been done. Anxiety: he repots that he has had a littl emore anxitey recntly  Frequent Bowel Movements, Loose stools: he reports having loss stools about every other week. Review of Systems  ROS       Allergies: Sulfa (sulfonamide antibiotics)    Medications:     Current Outpatient Medications:     warfarin (COUMADIN) 1 mg tablet, Take 2.5mg on Monday and Friday; take 3.5mg all other days. , Disp: 60 Tablet, Rfl: 1    PARoxetine (PaxiL) 20 mg tablet, Take 1 Tablet by mouth two (2) times a day., Disp: 180 Tablet, Rfl: 1    busPIRone (BUSPAR) 5 mg tablet, Take 1 Tablet by mouth two (2) times a day., Disp: 180 Tablet, Rfl: 1    warfarin (Coumadin) 2.5 mg tablet, Use as directed, Disp: 180 Tablet, Rfl: 0    glucose blood VI test strips (blood glucose test) strip, Use to check fasting and evening blood sugar daily. Patient may use whichever product is covered by insurance., Disp: 200 Strip, Rfl: 1    atorvastatin (LIPITOR) 10 mg tablet, Take 1 Tablet by mouth daily. , Disp: 90 Tablet, Rfl: 3    metFORMIN ER (GLUCOPHAGE XR) 500 mg tablet, Take 1 Tablet by mouth three (3) times daily (with meals). , Disp: 270 Tablet, Rfl: 1    allopurinoL (ZYLOPRIM) 100 mg tablet, Take 0.5 Tablets by mouth daily. , Disp: 45 Tablet, Rfl: 1    sotaloL (BETAPACE) 80 mg tablet, Take 1 Tablet by mouth two (2) times a day., Disp: 180 Tablet, Rfl: 1    lancets misc, Use to check fasting and evening blood sugar daily. Patient may use whichever product is covered by insurance., Disp: 1 Each, Rfl: 11    Blood-Glucose Meter monitoring kit, Use to check fasting and evening blood sugar daily. Patient may use whichever product is covered by insurance., Disp: 1 Kit, Rfl: 0    senna-docusate (PERICOLACE) 8.6-50 mg per tablet, Take 1 Tablet by mouth two (2) times daily as needed for Constipation. , Disp: 60 Tablet, Rfl: 0    acetaminophen (TYLENOL) 500 mg tablet, Take 1 Tablet by mouth every four (4) hours. , Disp: 100 Tablet, Rfl: 0    iron 18 mg tab, Take 1 mg by mouth., Disp: , Rfl:     Lactobacillus acidophilus (PROBIOTIC PO), Take 15 billion cell by mouth daily. , Disp: , Rfl:     digestive enzymes cap, Take 220 mg by mouth daily. , Disp: , Rfl:     OTHER,NON-FORMULARY,, Take 10 mg by mouth daily. HEMP EXTRACT GUMMIES, Disp: , Rfl:     b complex-folic acid 0.4 mg tablet, Take 1 Tab by mouth daily. , Disp: , Rfl:     Comp. Stocking,Knee,Regular,Lrg (Relief Knee Open Toe Stocking) misc, Use daily for lower extremity swelling, Size large 30-40mm, Model # [id number], Disp: 2 Each, Rfl: 1    ascorbic acid, vitamin C, (VITAMIN C) 500 mg tablet, Take  by mouth., Disp: , Rfl:     gluc case/chondro case A/vit C/Mn (GLUCOSAMINE 1500 COMPLEX PO), Take  by mouth., Disp: , Rfl:     folic acid (FOLVITE) 1 mg tablet, Take  by mouth daily. , Disp: , Rfl:     cholecalciferol (VITAMIN D3) (2,000 UNITS /50 MCG) cap capsule, Take 2,000 Units by mouth daily. , Disp: , Rfl:     magnesium 250 mg tab, Take  by mouth., Disp: , Rfl:     calcium carbonate (CALCIUM 500 PO), Take  by mouth., Disp: , Rfl:     turmeric (CURCUMIN), 1 Tablet by Does Not Apply route daily. , Disp: , Rfl:     History  Patient Care Team:  Eh Morgan MD as PCP - General (Family Medicine)  Eh Morgan MD as PCP - [address] [place] [address]  [facility] Provider  Lisa Mares MD as Physician (Otolaryngology)  Mariela Burnette MD as Physician (Cardiovascular Disease Physician)  Jonnie Nagy MD (Orthopedic Surgery)    Past Medical History: he has a past medical history of Acoustic neuroma [facility]) (02/18/2020), Basal cell adenocarcinoma, Chronic deep vein thrombosis (DVT) of lower extremity ([facility] [address] (2/14/2020), COVID-19 vaccine series completed, Hearing loss, right, Hypercholesteremia (2/14/2020), Melanoma in situ ([facility] [address] ISABELLA on CPAP (2/18/2020), Paroxysmal tachycardia, unspecified ([facility] [address] (2/14/2020), Polymyalgia rheumatica ([facility] [address] Psychiatric disorder, and Skin cancer. Past Surgical History: he has a past surgical history that includes hx colonoscopy; hx orthopaedic (Right); hx hernia repair; hx gi; hx endoscopy; hx wisdom teeth extraction; and hx tonsillectomy. Family Medical History: family history includes Heart Disease in his father; OSTEOARTHRITIS in his sister. Social History: he reports that he quit smoking about 42 years ago. His smoking use included pipe. He has never been exposed to tobacco smoke. He has never used smokeless tobacco. He reports current alcohol use of about 21.0 standard drinks per week. He reports that he does not use drugs.         Objective:   Visit Vitals  /62 (BP 1 Location: Left upper arm, BP Patient Position: Sitting, BP Cuff Size: Large adult)   Pulse 62 Temp 97.2 °F (36.2 °C) (Temporal)   Resp 18   Ht 5' 10\" (1.778 m)   Wt 192 lb 12.8 oz (87.5 kg)   SpO2 97%   BMI 27.66 kg/m²     Wt Readings from Last 3 Encounters:   02/27/23 192 lb 12.8 oz (87.5 kg)   12/12/22 190 lb 12.8 oz (86.5 kg)   06/30/22 180 lb (81.6 kg)       Physical Exam    Assessment & Plan:    {No Diagnosis Found}        I have discussed the diagnosis with the patient and the intended plan as seen in the above orders. The patient has received an after-visit summary along with patient information handout. I have discussed medication side effects and warnings with the patient as well.     Disposition        Brutus Ormond, MD URINALYSIS W/ RFLX MICROSCOPIC      TSH 3RD GENERATION      3. Hypercholesteremia  E78.00 272.0 CBC W/O DIFF      LIPID PANEL      METABOLIC PANEL, COMPREHENSIVE      4. Screening for malignant neoplasm of prostate  Z12.5 V76.44 PSA SCREENING (SCREENING)      5. Chronic gout without tophus, unspecified cause, unspecified site  M1A. 9XX0 274.02 URIC ACID      6. Anxiety  F41.9 300.00       7. Paroxysmal atrial fibrillation (HCC)  I48.0 427.31       8. Acoustic neuroma (HCC)  D33.3 225.1       9. ISABELLA on CPAP  G47.33 327.23     Z99.89 V46.8       10. Anticoagulated on Coumadin  Z79.01 V58.61         Loose Stools: New problem, uncontrolled. Evaluate further as above and referral to GI. Discussed red flag symptoms and reasons to call or go to ED  Acoustic Neuroma: Chronic, unclear control. Provided patient with phone number to schedule MRI that was ordered by ENT. Advised to follow up with ENT as advised. All other conditions listed above: Chronic, stable and/or managed by specialist. Will continue current treatment regimen. Will reorder labs as reflected above. Advised patient to ensure that he schedules this. Discussed following up with specialist as scheduled. Discussed recommendations for diet and exercise. I have discussed the diagnosis with the patient and the intended plan as seen in the above orders. The patient has received an after-visit summary along with patient information handout. I have discussed medication side effects and warnings with the patient as well.     Disposition  Follow-up and Dispositions    Return in about 3 months (around 5/27/2023) for medicare wellness exam.           Christen Cartagena MD

## 2023-02-27 NOTE — PROGRESS NOTES
Chief Complaint   Patient presents with    Abdominal Pain     Abd pain          1. \"Have you been to the ER, urgent care clinic since your last visit? Hospitalized since your last visit? \" No    2. \"Have you seen or consulted any other health care providers outside of the [address] [place] [address] since your last visit? \" No  No    3. For patients aged 39-70: Has the patient had a colonoscopy / FIT/ Cologuard? NA - based on age      If the patient is female:    4. For patients aged 41-77: Has the patient had a mammogram within the past 2 years? NA - based on age or sex      11. For patients aged 21-65: Has the patient had a pap smear?  NA - based on age or sex         3 most recent PHQ Screens 2/27/2023   Little interest or pleasure in doing things Not at all   Feeling down, depressed, irritable, or hopeless Not at all   Total Score PHQ 2 0       Health Maintenance Due   Topic Date Due    Foot Exam Q1  Never done    Eye Exam Retinal or Dilated  Never done    DTaP/Tdap/Td series (1 - Tdap) Never done    Shingles Vaccine (2 of 2) 12/27/2019    COVID-19 Vaccine (3 - Booster for Moderna series) 05/30/2021    Medicare Yearly Exam  10/01/2021    Lipid Screen  11/11/2021    Flu Vaccine (1) 08/01/2022    Depression Screen  03/04/2023

## 2023-03-06 DIAGNOSIS — E79.0 ELEVATED URIC ACID IN BLOOD: ICD-10-CM

## 2023-03-06 RX ORDER — SOTALOL HYDROCHLORIDE 80 MG/1
80 TABLET ORAL 2 TIMES DAILY
Qty: 180 TABLET | Refills: 1 | Status: SHIPPED | OUTPATIENT
Start: 2023-03-06

## 2023-03-06 RX ORDER — ALLOPURINOL 100 MG/1
50 TABLET ORAL DAILY
Qty: 45 TABLET | Refills: 1 | Status: SHIPPED | OUTPATIENT
Start: 2023-03-06

## 2023-03-06 NOTE — TELEPHONE ENCOUNTER
Last Visit: 2/27/23 MD Ty Marmolejo  Next Appointment: None  Previous Refill Encounter(s):   Allopurinol 9/8/22 45 + 1  Sotalol 9/8/22 180 + 1    Requested Prescriptions     Pending Prescriptions Disp Refills    allopurinoL (ZYLOPRIM) 100 mg tablet 45 Tablet 1     Sig: Take 0.5 Tablets by mouth daily. sotaloL (BETAPACE) 80 mg tablet 180 Tablet 1     Sig: Take 1 Tablet by mouth two (2) times a day. For Pharmacy Admin Tracking Only    Program: Medication Refill  CPA in place:   Recommendation Provided To:    Intervention Detail: New Rx: 2, reason: Patient Preference  Intervention Accepted By:   Emmie Mckeon Closed?:   Time Spent (min): 5

## 2023-03-14 DIAGNOSIS — E11.9 TYPE 2 DIABETES MELLITUS WITHOUT COMPLICATION, WITHOUT LONG-TERM CURRENT USE OF INSULIN (HCC): ICD-10-CM

## 2023-03-14 RX ORDER — METFORMIN HYDROCHLORIDE 500 MG/1
500 TABLET, EXTENDED RELEASE ORAL
Qty: 270 TABLET | Refills: 1 | Status: SHIPPED | OUTPATIENT
Start: 2023-03-14

## 2023-03-14 NOTE — TELEPHONE ENCOUNTER
Last visit 02/27/2023 MD Arsenio Eisenmenger   Next appointment Nothing scheduled   Previous refill encounter(s)   09/12/2022 Glucophage-XR #270 with 1 refills. For Pharmacy Admin Tracking Only    Program: Medication Refill  Intervention Detail: New Rx: 1, reason: Patient Preference  Time Spent (min): 5      Requested Prescriptions     Pending Prescriptions Disp Refills    metFORMIN ER (GLUCOPHAGE XR) 500 mg tablet 270 Tablet 0     Sig: Take 1 Tablet by mouth three (3) times daily (with meals).

## 2023-03-16 ENCOUNTER — LAB ONLY (OUTPATIENT)
Dept: FAMILY MEDICINE CLINIC | Age: 83
End: 2023-03-16

## 2023-03-16 DIAGNOSIS — Z12.5 SCREENING FOR MALIGNANT NEOPLASM OF PROSTATE: ICD-10-CM

## 2023-03-16 DIAGNOSIS — E11.9 TYPE 2 DIABETES MELLITUS WITHOUT COMPLICATION, WITHOUT LONG-TERM CURRENT USE OF INSULIN (HCC): ICD-10-CM

## 2023-03-16 DIAGNOSIS — E78.00 HYPERCHOLESTEREMIA: ICD-10-CM

## 2023-03-16 DIAGNOSIS — M1A.9XX0 CHRONIC GOUT WITHOUT TOPHUS, UNSPECIFIED CAUSE, UNSPECIFIED SITE: ICD-10-CM

## 2023-03-17 LAB
ALBUMIN SERPL-MCNC: 4 G/DL (ref 3.5–5)
ALBUMIN/GLOB SERPL: 1.5 (ref 1.1–2.2)
ALP SERPL-CCNC: 65 U/L (ref 45–117)
ALT SERPL-CCNC: 26 U/L (ref 12–78)
ANION GAP SERPL CALC-SCNC: 3 MMOL/L (ref 5–15)
APPEARANCE UR: CLEAR
AST SERPL-CCNC: 26 U/L (ref 15–37)
BILIRUB SERPL-MCNC: 0.5 MG/DL (ref 0.2–1)
BILIRUB UR QL: NEGATIVE
BUN SERPL-MCNC: 20 MG/DL (ref 6–20)
BUN/CREAT SERPL: 24 (ref 12–20)
CALCIUM SERPL-MCNC: 9.1 MG/DL (ref 8.5–10.1)
CHLORIDE SERPL-SCNC: 104 MMOL/L (ref 97–108)
CHOLEST SERPL-MCNC: 158 MG/DL
CO2 SERPL-SCNC: 30 MMOL/L (ref 21–32)
COLOR UR: NORMAL
COMMENT, HOLDF: NORMAL
CREAT SERPL-MCNC: 0.85 MG/DL (ref 0.7–1.3)
ERYTHROCYTE [DISTWIDTH] IN BLOOD BY AUTOMATED COUNT: 14 % (ref 11.5–14.5)
EST. AVERAGE GLUCOSE BLD GHB EST-MCNC: 143 MG/DL
GLOBULIN SER CALC-MCNC: 2.6 G/DL (ref 2–4)
GLUCOSE SERPL-MCNC: 131 MG/DL (ref 65–100)
GLUCOSE UR STRIP.AUTO-MCNC: NEGATIVE MG/DL
HBA1C MFR BLD: 6.6 % (ref 4–5.6)
HCT VFR BLD AUTO: 39.9 % (ref 36.6–50.3)
HDLC SERPL-MCNC: 60 MG/DL
HDLC SERPL: 2.6 (ref 0–5)
HGB BLD-MCNC: 12.5 G/DL (ref 12.1–17)
HGB UR QL STRIP: NEGATIVE
KETONES UR QL STRIP.AUTO: NEGATIVE MG/DL
LDLC SERPL CALC-MCNC: 76.6 MG/DL (ref 0–100)
LEUKOCYTE ESTERASE UR QL STRIP.AUTO: NEGATIVE
MCH RBC QN AUTO: 32.5 PG (ref 26–34)
MCHC RBC AUTO-ENTMCNC: 31.3 G/DL (ref 30–36.5)
MCV RBC AUTO: 103.6 FL (ref 80–99)
NITRITE UR QL STRIP.AUTO: NEGATIVE
NRBC # BLD: 0 K/UL (ref 0–0.01)
NRBC BLD-RTO: 0 PER 100 WBC
PH UR STRIP: 6 (ref 5–8)
PLATELET # BLD AUTO: 201 K/UL (ref 150–400)
PMV BLD AUTO: 10.5 FL (ref 8.9–12.9)
POTASSIUM SERPL-SCNC: 5.3 MMOL/L (ref 3.5–5.1)
PROT SERPL-MCNC: 6.6 G/DL (ref 6.4–8.2)
PROT UR STRIP-MCNC: NEGATIVE MG/DL
PSA SERPL-MCNC: 3.9 NG/ML (ref 0.01–4)
RBC # BLD AUTO: 3.85 M/UL (ref 4.1–5.7)
SAMPLES BEING HELD,HOLD: NORMAL
SODIUM SERPL-SCNC: 137 MMOL/L (ref 136–145)
SP GR UR REFRACTOMETRY: 1.02 (ref 1–1.03)
TRIGL SERPL-MCNC: 107 MG/DL (ref ?–150)
TSH SERPL DL<=0.05 MIU/L-ACNC: 2.91 UIU/ML (ref 0.36–3.74)
URATE SERPL-MCNC: 7.1 MG/DL (ref 3.5–7.2)
UROBILINOGEN UR QL STRIP.AUTO: 0.2 EU/DL (ref 0.2–1)
VLDLC SERPL CALC-MCNC: 21.4 MG/DL
WBC # BLD AUTO: 4.4 K/UL (ref 4.1–11.1)

## 2023-03-21 ENCOUNTER — TELEPHONE (OUTPATIENT)
Dept: FAMILY MEDICINE CLINIC | Age: 83
End: 2023-03-21

## 2023-03-21 NOTE — TELEPHONE ENCOUNTER
Documentation Completion    Patient presents for form completion. Performs focused physical exam to verify that patient is able to complete tasks listed on section 4 of [employer] form.      Mario Lee MD

## 2023-03-27 ENCOUNTER — LAB ONLY (OUTPATIENT)
Dept: FAMILY MEDICINE CLINIC | Age: 83
End: 2023-03-27

## 2023-03-27 DIAGNOSIS — E87.5 SERUM POTASSIUM ELEVATED: Primary | ICD-10-CM

## 2023-03-27 DIAGNOSIS — E87.5 SERUM POTASSIUM ELEVATED: ICD-10-CM

## 2023-03-28 LAB — POTASSIUM SERPL-SCNC: 5.3 MMOL/L (ref 3.5–5.1)

## 2023-03-29 NOTE — PROGRESS NOTES
Called patient. Potassium stable but remains elevated. He reports eating 1/2 banana every day. He is not sure if he has vitamin supplement with potassium, but he plans to check. Advised minimizing excess potassium and scheduling visit with new PCP to recheck in two weeks. He expresses agreement and understanding.

## 2023-04-17 ENCOUNTER — OFFICE VISIT (OUTPATIENT)
Dept: ORTHOPEDIC SURGERY | Age: 83
End: 2023-04-17
Payer: MEDICARE

## 2023-04-17 VITALS — WEIGHT: 185 LBS | BODY MASS INDEX: 26.48 KG/M2 | HEIGHT: 70 IN

## 2023-04-17 DIAGNOSIS — M17.11 PRIMARY OSTEOARTHRITIS OF RIGHT KNEE: Primary | ICD-10-CM

## 2023-04-17 DIAGNOSIS — M25.461 EFFUSION OF RIGHT KNEE: ICD-10-CM

## 2023-04-17 DIAGNOSIS — M11.261 PSEUDOGOUT OF RIGHT KNEE: ICD-10-CM

## 2023-04-17 PROCEDURE — G8536 NO DOC ELDER MAL SCRN: HCPCS | Performed by: PHYSICIAN ASSISTANT

## 2023-04-17 PROCEDURE — G8427 DOCREV CUR MEDS BY ELIG CLIN: HCPCS | Performed by: PHYSICIAN ASSISTANT

## 2023-04-17 PROCEDURE — 1123F ACP DISCUSS/DSCN MKR DOCD: CPT | Performed by: PHYSICIAN ASSISTANT

## 2023-04-17 PROCEDURE — 20610 DRAIN/INJ JOINT/BURSA W/O US: CPT | Performed by: PHYSICIAN ASSISTANT

## 2023-04-17 PROCEDURE — 1101F PT FALLS ASSESS-DOCD LE1/YR: CPT | Performed by: PHYSICIAN ASSISTANT

## 2023-04-17 PROCEDURE — G8432 DEP SCR NOT DOC, RNG: HCPCS | Performed by: PHYSICIAN ASSISTANT

## 2023-04-17 PROCEDURE — 99213 OFFICE O/P EST LOW 20 MIN: CPT | Performed by: PHYSICIAN ASSISTANT

## 2023-04-17 PROCEDURE — G8417 CALC BMI ABV UP PARAM F/U: HCPCS | Performed by: PHYSICIAN ASSISTANT

## 2023-04-17 RX ORDER — TRIAMCINOLONE ACETONIDE 40 MG/ML
40 INJECTION, SUSPENSION INTRA-ARTICULAR; INTRAMUSCULAR ONCE
Status: COMPLETED | OUTPATIENT
Start: 2023-04-17 | End: 2023-04-17

## 2023-04-17 RX ORDER — BUPIVACAINE HYDROCHLORIDE 5 MG/ML
5 INJECTION, SOLUTION PERINEURAL ONCE
Status: COMPLETED | OUTPATIENT
Start: 2023-04-17 | End: 2023-04-17

## 2023-04-17 RX ADMIN — BUPIVACAINE HYDROCHLORIDE 25 MG: 5 INJECTION, SOLUTION PERINEURAL at 11:52

## 2023-04-17 RX ADMIN — TRIAMCINOLONE ACETONIDE 40 MG: 40 INJECTION, SUSPENSION INTRA-ARTICULAR; INTRAMUSCULAR at 11:52

## 2023-04-17 NOTE — PROGRESS NOTES
Adonay Pena (: 1940) is a [de-identified] y.o. male, patient, here for evaluation of the following chief complaint(s):  Knee Pain (Right )       SUBJECTIVE/OBJECTIVE:  Donald Hewitt presents today complaining of right knee pain and swelling. Issue started yesterday, no known injury or trauma. He had a similar episode on the contralateral side last year. Left knee aspiration sent for analysis resulted in pseudogout. Aspiration and cortisone injection helped his left knee pain at that time, no issues since. Right knee pain is diffuse, difficulty with range of motion due to tense effusion. PHYSICAL EXAM:  Vitals: Ht 5' 10\" (1.778 m)   Wt 185 lb (83.9 kg)   BMI 26.54 kg/m²   Body mass index is 26.54 kg/m². [de-identified]y.o. year old M in no acute distress. Ambulates with a slight limp started on the right, no assistive device. Right knee with neutral alignment. 2-3+ tense effusion. Range of motion 0-90 prior to aspiration. Skin warm, dry, no effusion. Motor 5/5. No distal edema    IMAGING:  Radiographs: XR Results (most recent):  Results from Appointment encounter on 23    XR KNEE RT MIN 4 V    Narrative  Four views (AP, PA-flex, lateral & sunrise) of the right knee were taken and reviewed today using digital radiography which reveal moderate to severe loss of medial joint space, moderate to severe patellofemoral osteoarthritis, worse in the lateral patellar facet. Tricompartmental osteophytes noted. Chondrocalcinosis in the lateral joint. Significant vascular calcification noted    ASSESSMENT/PLAN:  1. Primary osteoarthritis of right knee  -     XR KNEE RT MIN 4 V; Future  -     BUPivacaine HCl (MARCAINE) 0.5 % (5 mg/mL) injection 25 mg; 25 mg (5 mL), Other, ONCE, 1 dose, On 23 at 1200  -     triamcinolone acetonide (KENALOG-40) 40 mg/mL injection 40 mg; 40 mg, Intra artICUlar, ONCE, 1 dose, On 23 at 1200  2. Pseudogout of right knee  3.  Effusion of right knee    The xray and exam findings were discussed with the patient today. Acute effusion in the setting of right knee osteoarthritis. Suspect pseudogout. Right knee aspirated, cortisone injection placed. Continue with aspirin and ice. Discussed vascular findings on xray with patient. Patient noted history of left knee injury many years ago which resulted in numerous DVTs in his right leg. Follow-up as needed. Under sterile conditions, the knee as anesthetized with 2cc 0.5 bupivacaine with a 22g needle. Knee was then aspirated using an 18g needle, 100 cc of fluid removed, and knee injected with 5cc 0.5% Sensorcaine and 1cc 40mg Kenalog, tolerated the procedure well. Return if symptoms worsen or fail to improve. Review Of Systems  ROS    Positive for: Musculoskeletal  Last edited by Negrito Jaeger on 4/17/2023 11:06 AM.         Patient denies any recent fever, chills, nausea, vomiting, chest pain, or shortness of breath. Allergies   Allergen Reactions    Sulfa (Sulfonamide Antibiotics) Rash       Current Outpatient Medications   Medication Sig    sotaloL (BETAPACE) 80 mg tablet Take 1 Tablet by mouth two (2) times a day. warfarin (Coumadin) 2.5 mg tablet Use as directed    metFORMIN ER (GLUCOPHAGE XR) 500 mg tablet Take 1 Tablet by mouth three (3) times daily (with meals). allopurinoL (ZYLOPRIM) 100 mg tablet Take 0.5 Tablets by mouth daily. warfarin (COUMADIN) 1 mg tablet Take 2.5mg on Monday and Friday; take 3.5mg all other days. PARoxetine (PaxiL) 20 mg tablet Take 1 Tablet by mouth two (2) times a day. busPIRone (BUSPAR) 5 mg tablet Take 1 Tablet by mouth two (2) times a day. glucose blood VI test strips (blood glucose test) strip Use to check fasting and evening blood sugar daily. Patient may use whichever product is covered by insurance. atorvastatin (LIPITOR) 10 mg tablet Take 1 Tablet by mouth daily. lancets misc Use to check fasting and evening blood sugar daily. Patient may use whichever product is covered by insurance. Blood-Glucose Meter monitoring kit Use to check fasting and evening blood sugar daily. Patient may use whichever product is covered by insurance. senna-docusate (PERICOLACE) 8.6-50 mg per tablet Take 1 Tablet by mouth two (2) times daily as needed for Constipation. acetaminophen (TYLENOL) 500 mg tablet Take 1 Tablet by mouth every four (4) hours. iron 18 mg tab Take 1 mg by mouth. Lactobacillus acidophilus (PROBIOTIC PO) Take 15 billion cell by mouth daily. digestive enzymes cap Take 220 mg by mouth daily. OTHER,NON-FORMULARY, Take 10 mg by mouth daily. HEMP EXTRACT GUMMIES    b complex-folic acid 0.4 mg tablet Take 1 Tab by mouth daily. Comp. Stocking,Knee,Regular,Lrg (Relief Knee Open Toe Stocking) misc Use daily for lower extremity swelling, Size large 30-40mm, Model # [id number]    ascorbic acid, vitamin C, (VITAMIN C) 500 mg tablet Take  by mouth.    gluc case/chondro case A/vit C/Mn (GLUCOSAMINE 1500 COMPLEX PO) Take  by mouth. folic acid (FOLVITE) 1 mg tablet Take  by mouth daily. cholecalciferol (VITAMIN D3) (2,000 UNITS /50 MCG) cap capsule Take 2,000 Units by mouth daily. magnesium 250 mg tab Take  by mouth.    calcium carbonate (CALCIUM 500 PO) Take  by mouth. turmeric (CURCUMIN) 1 Tablet by Does Not Apply route daily. No current facility-administered medications for this visit.        Past Medical History:   Diagnosis Date    Acoustic neuroma ([facility] 02/18/2020    Basal cell adenocarcinoma     Chronic deep vein thrombosis (DVT) of lower extremity ([facility] 2/14/2020    1.8 to 2.5 is goal    COVID-19 vaccine series completed     MODERNA 03/07/21 & 04/04/21    Hearing loss, right     Hypercholesteremia 2/14/2020    Lipitor 20mg    Melanoma in situ ([address]     TOP OF HEAD AND ON OUTSIDE OF LEFT KNEE    ISABELLA on CPAP 2/18/2020    Paroxysmal tachycardia, unspecified ([facility] 2/14/2020    Sotalol Saw Demetrius Santos MD     Polymyalgia rheumatica (HCC)     Psychiatric disorder     MILD ANXIETY     Skin cancer     SQUAMUS CELL         Past Surgical History:   Procedure Laterality Date    HX COLONOSCOPY      hx of GI bleed    HX ENDOSCOPY      HX GI      EXCISION IN COLONON     HX HERNIA REPAIR      X3    HX ORTHOPAEDIC Right     SHOULDER     HX TONSILLECTOMY      HX WISDOM TEETH EXTRACTION         Family History   Problem Relation Age of Onset    Heart Disease Father         MI    OSTEOARTHRITIS Sister     Anesth Problems Neg Hx         Social History     Socioeconomic History    Marital status:      Spouse name: Not on file    Number of children: Not on file    Years of education: Not on file    Highest education level: Not on file   Occupational History    Not on file   Tobacco Use    Smoking status: Former     Types: Pipe     Quit date: 1/15/1981     Years since quittin.2     Passive exposure: Never    Smokeless tobacco: Never    Tobacco comments:     PIPE   Vaping Use    Vaping Use: Never used   Substance and Sexual Activity    Alcohol use:  Yes     Alcohol/week: 21.0 standard drinks     Types: 7 Glasses of wine, 7 Cans of beer, 7 Shots of liquor per week     Comment: HAS ONE OR THE OTHER ALTERNATES A DIFFERENT ON EACH DAY     Drug use: Never    Sexual activity: Yes   Other Topics Concern    Not on file   Social History Narrative    Not on file     Social Determinants of Health     Financial Resource Strain: Low Risk     Difficulty of Paying Living Expenses: Not hard at all   Food Insecurity: No Food Insecurity    Worried About Running Out of Food in the Last Year: Never true    Ran Out of Food in the Last Year: Never true   Transportation Needs: Not on file   Physical Activity: Not on file   Stress: Not on file   Social Connections: Not on file   Intimate Partner Violence: Not on file   Housing Stability: Not on file       Orders Placed This Encounter    XR KNEE RT MIN 4 V     Standing Status:   Future     Number of Occurrences:   1 Standing Expiration Date:   4/17/2024    BUPivacaine HCl (MARCAINE) 0.5 % (5 mg/mL) injection 25 mg    triamcinolone acetonide (KENALOG-40) 40 mg/mL injection 40 mg      Jg Donahue M.D. was available for immediate consultation as the supervising physician. An electronic signature was used to authenticate this note.   -- Angelina Wilkinson PA-C

## 2023-05-03 ENCOUNTER — OFFICE VISIT (OUTPATIENT)
Dept: FAMILY MEDICINE CLINIC | Age: 83
End: 2023-05-03
Payer: MEDICARE

## 2023-05-03 VITALS
TEMPERATURE: 97.3 F | HEART RATE: 68 BPM | HEIGHT: 70 IN | RESPIRATION RATE: 18 BRPM | WEIGHT: 195 LBS | SYSTOLIC BLOOD PRESSURE: 102 MMHG | OXYGEN SATURATION: 99 % | DIASTOLIC BLOOD PRESSURE: 70 MMHG | BODY MASS INDEX: 27.92 KG/M2

## 2023-05-03 DIAGNOSIS — Z85.820 HX OF MELANOMA EXCISION: ICD-10-CM

## 2023-05-03 DIAGNOSIS — E11.9 TYPE 2 DIABETES MELLITUS WITHOUT COMPLICATION, WITHOUT LONG-TERM CURRENT USE OF INSULIN (HCC): Primary | ICD-10-CM

## 2023-05-03 DIAGNOSIS — G47.33 OSA ON CPAP: ICD-10-CM

## 2023-05-03 DIAGNOSIS — E87.5 HYPERKALEMIA: ICD-10-CM

## 2023-05-03 DIAGNOSIS — M25.552 LEFT HIP PAIN: ICD-10-CM

## 2023-05-03 DIAGNOSIS — I82.5Z9 CHRONIC DEEP VEIN THROMBOSIS (DVT) OF DISTAL VEIN OF LOWER EXTREMITY, UNSPECIFIED LATERALITY (HCC): ICD-10-CM

## 2023-05-03 DIAGNOSIS — I48.0 PAROXYSMAL ATRIAL FIBRILLATION (HCC): ICD-10-CM

## 2023-05-03 DIAGNOSIS — D33.3 ACOUSTIC NEUROMA (HCC): ICD-10-CM

## 2023-05-03 DIAGNOSIS — F41.9 ANXIETY: ICD-10-CM

## 2023-05-03 DIAGNOSIS — E78.00 HYPERCHOLESTEREMIA: ICD-10-CM

## 2023-05-03 DIAGNOSIS — Z99.89 OSA ON CPAP: ICD-10-CM

## 2023-05-03 DIAGNOSIS — Z98.890 HX OF MELANOMA EXCISION: ICD-10-CM

## 2023-05-03 PROCEDURE — G8510 SCR DEP NEG, NO PLAN REQD: HCPCS | Performed by: STUDENT IN AN ORGANIZED HEALTH CARE EDUCATION/TRAINING PROGRAM

## 2023-05-03 PROCEDURE — 1101F PT FALLS ASSESS-DOCD LE1/YR: CPT | Performed by: STUDENT IN AN ORGANIZED HEALTH CARE EDUCATION/TRAINING PROGRAM

## 2023-05-03 PROCEDURE — 1123F ACP DISCUSS/DSCN MKR DOCD: CPT | Performed by: STUDENT IN AN ORGANIZED HEALTH CARE EDUCATION/TRAINING PROGRAM

## 2023-05-03 PROCEDURE — G8427 DOCREV CUR MEDS BY ELIG CLIN: HCPCS | Performed by: STUDENT IN AN ORGANIZED HEALTH CARE EDUCATION/TRAINING PROGRAM

## 2023-05-03 PROCEDURE — G8536 NO DOC ELDER MAL SCRN: HCPCS | Performed by: STUDENT IN AN ORGANIZED HEALTH CARE EDUCATION/TRAINING PROGRAM

## 2023-05-03 PROCEDURE — 99215 OFFICE O/P EST HI 40 MIN: CPT | Performed by: STUDENT IN AN ORGANIZED HEALTH CARE EDUCATION/TRAINING PROGRAM

## 2023-05-03 PROCEDURE — G8417 CALC BMI ABV UP PARAM F/U: HCPCS | Performed by: STUDENT IN AN ORGANIZED HEALTH CARE EDUCATION/TRAINING PROGRAM

## 2023-05-03 PROCEDURE — 3044F HG A1C LEVEL LT 7.0%: CPT | Performed by: STUDENT IN AN ORGANIZED HEALTH CARE EDUCATION/TRAINING PROGRAM

## 2023-05-03 PROCEDURE — G0463 HOSPITAL OUTPT CLINIC VISIT: HCPCS | Performed by: STUDENT IN AN ORGANIZED HEALTH CARE EDUCATION/TRAINING PROGRAM

## 2023-05-03 RX ORDER — BUSPIRONE HYDROCHLORIDE 5 MG/1
5 TABLET ORAL 2 TIMES DAILY
Qty: 180 TABLET | Refills: 0 | Status: SHIPPED | OUTPATIENT
Start: 2023-05-03

## 2023-05-04 PROBLEM — M1A.9XX0 CHRONIC GOUT WITHOUT TOPHUS: Status: RESOLVED | Noted: 2020-08-16 | Resolved: 2023-05-04

## 2023-05-04 LAB
ANION GAP SERPL CALC-SCNC: 4 MMOL/L (ref 5–15)
BUN SERPL-MCNC: 19 MG/DL (ref 6–20)
BUN/CREAT SERPL: 21 (ref 12–20)
CALCIUM SERPL-MCNC: 9 MG/DL (ref 8.5–10.1)
CHLORIDE SERPL-SCNC: 109 MMOL/L (ref 97–108)
CO2 SERPL-SCNC: 29 MMOL/L (ref 21–32)
CREAT SERPL-MCNC: 0.89 MG/DL (ref 0.7–1.3)
GLUCOSE SERPL-MCNC: 130 MG/DL (ref 65–100)
MAGNESIUM SERPL-MCNC: 1.9 MG/DL (ref 1.6–2.4)
POTASSIUM SERPL-SCNC: 5.5 MMOL/L (ref 3.5–5.1)
SODIUM SERPL-SCNC: 142 MMOL/L (ref 136–145)

## 2023-05-08 DIAGNOSIS — E87.5 HYPERKALEMIA: Primary | ICD-10-CM

## 2023-05-25 RX ORDER — BUSPIRONE HYDROCHLORIDE 5 MG/1
5 TABLET ORAL 2 TIMES DAILY
COMMUNITY
Start: 2023-05-03

## 2023-05-25 RX ORDER — LANCETS 30 GAUGE
EACH MISCELLANEOUS
COMMUNITY
Start: 2022-07-13

## 2023-05-25 RX ORDER — AMOXICILLIN 250 MG
1 CAPSULE ORAL 2 TIMES DAILY PRN
COMMUNITY
Start: 2021-06-24 | End: 2023-06-09

## 2023-05-25 RX ORDER — WARFARIN SODIUM 2.5 MG/1
TABLET ORAL
COMMUNITY
Start: 2022-12-20

## 2023-05-25 RX ORDER — WARFARIN SODIUM 1 MG/1
TABLET ORAL
COMMUNITY
Start: 2023-02-24

## 2023-05-25 RX ORDER — ACETAMINOPHEN 500 MG
500 TABLET ORAL PRN
COMMUNITY
Start: 2021-06-24

## 2023-05-25 RX ORDER — METFORMIN HYDROCHLORIDE 500 MG/1
500 TABLET, EXTENDED RELEASE ORAL
COMMUNITY
Start: 2023-03-14

## 2023-05-25 RX ORDER — PAROXETINE HYDROCHLORIDE 20 MG/1
20 TABLET, FILM COATED ORAL 2 TIMES DAILY
COMMUNITY
Start: 2023-02-20

## 2023-05-25 RX ORDER — ATORVASTATIN CALCIUM 10 MG/1
10 TABLET, FILM COATED ORAL DAILY
COMMUNITY
Start: 2022-10-11

## 2023-05-25 RX ORDER — SOTALOL HYDROCHLORIDE 80 MG/1
80 TABLET ORAL 2 TIMES DAILY
COMMUNITY
Start: 2023-03-06

## 2023-05-25 RX ORDER — FOLIC ACID 1 MG/1
TABLET ORAL DAILY
COMMUNITY

## 2023-05-25 RX ORDER — ASCORBIC ACID 500 MG
TABLET ORAL
COMMUNITY

## 2023-05-25 RX ORDER — ALLOPURINOL 100 MG/1
50 TABLET ORAL DAILY
COMMUNITY
Start: 2023-03-06

## 2023-06-28 ENCOUNTER — TELEPHONE (OUTPATIENT)
Age: 83
End: 2023-06-28

## 2023-06-28 RX ORDER — WARFARIN SODIUM 2.5 MG/1
TABLET ORAL
Qty: 180 TABLET | Refills: 0 | Status: CANCELLED | OUTPATIENT
Start: 2023-06-28

## 2023-06-28 NOTE — TELEPHONE ENCOUNTER
MD Lexie Abad,    Receiving refill request for warfarin 2.5mg. This was previously prescribed by MD Mimi Muir but noted at visit that it is monitored by cardiology. Is this a change? (Takes 2.5 on Monday and Friday and takes 3.5 all other days)     Has rx for 1mg as well by Mimi Muir.  Thanks, [facility]    Last appointment: 5/3/23 MD Lexie Abad  Next appointment: None  Previous refill encounter(s): 12/20/22 180     Requested Prescriptions     Pending Prescriptions Disp Refills    warfarin (COUMADIN) 2.5 MG tablet 180 tablet 0     Sig: Use as directed     For Pharmacy Admin Tracking Only    Program: Medication Refill  Intervention Detail: New Rx: 1, reason: Patient Preference  Time Spent (min): 10

## 2023-06-28 NOTE — TELEPHONE ENCOUNTER
MD Nasreen Shoemaker:    Patient called today stating he is almost out of the warfarin 2.5mg. Noted your response and tried contacting patient to advise. LVM for return call and also advised to contact Cardiologist for this refill.   Will try patient again in the am.  Thanks, Sky Cabrera

## 2023-06-29 NOTE — TELEPHONE ENCOUNTER
MD Ramila Cherry,      **Additional information: Spoke to patient and he states he self tests the INR. He stated he can send records. INR has been very stable and has been on 30 years. Stated he will send a [employer] message to you as he would like to set up that you fill this warfarin.  (Advised patient again to contact cardiologist for this refill). Thanks, Nataliia      Contacted patient again but had to LVM again. (He left  last evening stating he can call the cardiologist if needed but stated that he has been on this for 20+ years and MD Elmira Art has been providing the medication. ). Advised via  to contact the cardiologist to fill the warfarin. (Patient stated that the pharmacy was providing him a few to last til the weekend. ).   Thanks, [facility] Tracking Only    Program: Medication Refill  Intervention Detail: Discontinued Rx: 1, reason: Patient Preference  Time Spent (min): 20

## 2023-08-28 RX ORDER — PAROXETINE HYDROCHLORIDE 20 MG/1
TABLET, FILM COATED ORAL
Qty: 180 TABLET | Refills: 0 | Status: SHIPPED | OUTPATIENT
Start: 2023-08-28

## 2023-08-28 NOTE — TELEPHONE ENCOUNTER
PCP: Skylar Tierney MD    Last appt: 5/3/2023     Future Appointments   Date Time Provider [address]   12/18/2023 11:40 AM [place] [address] BS AMB       Requested Prescriptions     Pending Prescriptions Disp Refills    PARoxetine (PAXIL) 20 MG tablet [Pharmacy Med Name: PAROXETINE HCL 20 MG TABLET] 180 tablet 0     Sig: TAKE ONE TABLET BY MOUTH 2 TIMES A DAY       Prior labs and Blood pressures:  BP Readings from Last 3 Encounters:   05/03/23 102/70   02/27/23 100/62   12/12/22 125/72     Lab Results   Component Value Date/Time     06/05/2023 04:02 PM    K 5.3 06/05/2023 04:02 PM     06/05/2023 04:02 PM    CO2 28 06/05/2023 04:02 PM    BUN 23 06/05/2023 04:02 PM    GFRAA >60 09/25/2022 02:56 AM     No results found for: HBA1C, [id number]  Lab Results   Component Value Date/Time    CHOL 158 03/16/2023 09:11 AM    HDL 60 03/16/2023 09:11 AM     No results found for: VITD3, VD3RIA    Lab Results   Component Value Date/Time    TSH 2.91 03/16/2023 09:11 AM

## 2023-08-30 RX ORDER — PAROXETINE HYDROCHLORIDE 20 MG/1
20 TABLET, FILM COATED ORAL 2 TIMES DAILY
Qty: 180 TABLET | Refills: 0 | OUTPATIENT
Start: 2023-08-30

## 2023-08-30 NOTE — TELEPHONE ENCOUNTER
Regarding: Refill for Paroxetine HCL 20 mg  Contact: [phone]  Long Goodman. Can you refill my prescription for Paroxetine HCL 20 mg? My pharmacy is [facility], [phone]. I'll be out of the pills Wednesday morning and so will need more then.         PCP: Bridgett Gordon MD    Last appt: 5/3/2023     Future Appointments   Date Time Provider [address]   10/17/2023  9:40 AM Bridgett Gordon MD PAFP BS AMB   12/18/2023 11:40 AM JAGUAR Jay - NP [facility] BS AMB       Requested Prescriptions     Pending Prescriptions Disp Refills    PARoxetine (PAXIL) 20 MG tablet 180 tablet 0     Sig: Take 1 tablet by mouth 2 times daily       Prior labs and Blood pressures:  BP Readings from Last 3 Encounters:   05/03/23 102/70   02/27/23 100/62   12/12/22 125/72     Lab Results   Component Value Date/Time     06/05/2023 04:02 PM    K 5.3 06/05/2023 04:02 PM     06/05/2023 04:02 PM    CO2 28 06/05/2023 04:02 PM    BUN 23 06/05/2023 04:02 PM    GFRAA >60 09/25/2022 02:56 AM     No results found for: HBA1C, [id number]  Lab Results   Component Value Date/Time    CHOL 158 03/16/2023 09:11 AM    HDL 60 03/16/2023 09:11 AM     No results found for: VITD3, VD3RIA    Lab Results   Component Value Date/Time    TSH 2.91 03/16/2023 09:11 AM

## 2023-08-30 NOTE — TELEPHONE ENCOUNTER
Spoke to pt on 8.30.23 pt stated he already picked up his medication,and that Russ Duke made his AMW for 10.17.23 @ 9:40 AM with .

## 2023-08-30 NOTE — TELEPHONE ENCOUNTER
Call and spoke to patient, two ID confirmed. Writer informed patient that med was sent on 8/28. Patient stated that they have it now.

## 2023-08-30 NOTE — TELEPHONE ENCOUNTER
----- Message from Randy Ordonez sent at 8/30/2023  9:40 AM EDT -----  Subject: Medication Problem    Medication: PARoxetine (PAXIL) 20 MG tablet  Dosage: 20MG, TAKE ONE TABLET BY MOUTH 2 TIMES A DAY  Ordering Provider: Parvin Luna    Question/Problem: The pharmacy is stating they didn't receive the med   approval, please advise      Pharmacy: [address] [place] [address], [address] [place] [address] Drive    ---------------------------------------------------------------------------  --------------  Florecita Villanueva INFO  [phone]; OK to leave message on voicemail  ---------------------------------------------------------------------------  --------------    SCRIPT ANSWERS  Relationship to Patient: Self

## 2023-08-30 NOTE — TELEPHONE ENCOUNTER
----- Message from Graeme Abdul sent at 8/28/2023  9:45 AM EDT -----  Regarding: Refill for Paroxetine HCL 20 mg  Contact: [phone]  Long Michele. Can you refill my prescription for Paroxetine HCL 20 mg? My pharmacy is [facility], [phone]. I'll be out of the pills Wednesday morning and so will need more then. Thank you for your help and care.     Best regards, Graeme Abdul

## 2023-09-08 NOTE — TELEPHONE ENCOUNTER
----- Message from Dana Martin sent at 9/7/2023  8:51 AM EDT -----  Regarding: Refills for Sotalol and Allopurinol  Contact: [phone]  [facility] Dr. Gabriela Pablo--    Can you refill my prescriptions for Sotalol (80 mg.) and Allopurinol (100 mg.)? They are both still in the name of Dr. Cleveland Kaba, and they are out of refills. My pharmacy is [facility]. I'm OK for Sotalol through next Tuesday and all of next week for Allopurinol. Please let me know through [employer] when you renew them. Thank you for your help!     Best regards, Dana Martin

## 2023-09-08 NOTE — TELEPHONE ENCOUNTER
Can you refill my prescriptions for Sotalol (80 mg.) and Allopurinol (100 mg.)? They are both still in the name of Dr. Moisés Cantu, and they are out of refills. My pharmacy is [facility]. I'm OK for Sotalol through next Tuesday and all of next week for Allopurinol. Please let me know through MyChart when you renew them.         PCP: Alanis Basilio MD    Last appt: 5/3/2023     Future Appointments   Date Time Provider [address]   10/17/2023  9:40 AM Alanis Basilio MD PAFP BS AMB   12/18/2023 11:40 AM JAGUAR Overton - NP [facility] BS AMB       Requested Prescriptions     Pending Prescriptions Disp Refills    sotalol (BETAPACE) 80 MG tablet 60 tablet      Sig: Take 1 tablet by mouth 2 times daily    allopurinol (ZYLOPRIM) 100 MG tablet 30 tablet      Sig: Take 0.5 tablets by mouth daily       Prior labs and Blood pressures:  BP Readings from Last 3 Encounters:   05/03/23 102/70   02/27/23 100/62   12/12/22 125/72     Lab Results   Component Value Date/Time     06/05/2023 04:02 PM    K 5.3 06/05/2023 04:02 PM     06/05/2023 04:02 PM    CO2 28 06/05/2023 04:02 PM    BUN 23 06/05/2023 04:02 PM    GFRAA >60 09/25/2022 02:56 AM     No results found for: HBA1C, [id number]  Lab Results   Component Value Date/Time    CHOL 158 03/16/2023 09:11 AM    HDL 60 03/16/2023 09:11 AM     No results found for: VITD3, VD3RIA    Lab Results   Component Value Date/Time    TSH 2.91 03/16/2023 09:11 AM

## 2023-09-11 RX ORDER — ALLOPURINOL 100 MG/1
50 TABLET ORAL DAILY
Qty: 30 TABLET | Refills: 1 | Status: SHIPPED | OUTPATIENT
Start: 2023-09-11

## 2023-09-11 RX ORDER — SOTALOL HYDROCHLORIDE 80 MG/1
80 TABLET ORAL 2 TIMES DAILY
Qty: 60 TABLET | Refills: 1 | Status: SHIPPED | OUTPATIENT
Start: 2023-09-11

## 2023-09-20 RX ORDER — METFORMIN HYDROCHLORIDE 500 MG/1
500 TABLET, EXTENDED RELEASE ORAL
Qty: 270 TABLET | Refills: 1 | Status: SHIPPED | OUTPATIENT
Start: 2023-09-20

## 2023-09-20 NOTE — TELEPHONE ENCOUNTER
Patient also sent mychart request for refill. Thanks, Casie Pickard    Last appointment: 5/3/23 MD Alecia Wright  Next appointment: 10/17/23 MD Alecia Wright  Previous refill encounter(s): 3/14/23 270 + 1    Requested Prescriptions     Pending Prescriptions Disp Refills    metFORMIN (GLUCOPHAGE-XR) 500 MG extended release tablet 270 tablet 1     Sig: Take 1 tablet by mouth 3 times daily (with meals)     For Pharmacy Admin Tracking Only    Program: Medication Refill  CPA in place:    Recommendation Provided To:    Intervention Detail: New Rx: 1, reason: Patient Preference  Intervention Accepted By:   dEvin Malagon Closed?:    Time Spent (min): 5

## 2023-10-09 RX ORDER — ATORVASTATIN CALCIUM 10 MG/1
10 TABLET, FILM COATED ORAL DAILY
Qty: 90 TABLET | Refills: 3 | Status: SHIPPED | OUTPATIENT
Start: 2023-10-09

## 2023-10-09 NOTE — TELEPHONE ENCOUNTER
PCP: Jordan Borden MD      Future Appointments   Date Time Provider [address]   10/17/2023  9:40 AM Jordan Borden MD PAFP BS AMB   12/18/2023 11:40 AM JAGUAR Serna NP [facility] BS AMB       Requested Prescriptions     Pending Prescriptions Disp Refills    atorvastatin (LIPITOR) 10 MG tablet 30 tablet      Sig: Take 1 tablet by mouth daily

## 2023-10-14 SDOH — ECONOMIC STABILITY: TRANSPORTATION INSECURITY
IN THE PAST 12 MONTHS, HAS LACK OF TRANSPORTATION KEPT YOU FROM MEETINGS, WORK, OR FROM GETTING THINGS NEEDED FOR DAILY LIVING?: NO

## 2023-10-14 SDOH — HEALTH STABILITY: PHYSICAL HEALTH: ON AVERAGE, HOW MANY MINUTES DO YOU ENGAGE IN EXERCISE AT THIS LEVEL?: 40 MIN

## 2023-10-14 SDOH — ECONOMIC STABILITY: INCOME INSECURITY: HOW HARD IS IT FOR YOU TO PAY FOR THE VERY BASICS LIKE FOOD, HOUSING, MEDICAL CARE, AND HEATING?: NOT HARD AT ALL

## 2023-10-14 SDOH — ECONOMIC STABILITY: FOOD INSECURITY: WITHIN THE PAST 12 MONTHS, THE FOOD YOU BOUGHT JUST DIDN'T LAST AND YOU DIDN'T HAVE MONEY TO GET MORE.: NEVER TRUE

## 2023-10-14 SDOH — ECONOMIC STABILITY: HOUSING INSECURITY
IN THE LAST 12 MONTHS, WAS THERE A TIME WHEN YOU DID NOT HAVE A STEADY PLACE TO SLEEP OR SLEPT IN A SHELTER (INCLUDING NOW)?: NO

## 2023-10-14 SDOH — HEALTH STABILITY: PHYSICAL HEALTH: ON AVERAGE, HOW MANY DAYS PER WEEK DO YOU ENGAGE IN MODERATE TO STRENUOUS EXERCISE (LIKE A BRISK WALK)?: 6 DAYS

## 2023-10-14 SDOH — ECONOMIC STABILITY: FOOD INSECURITY: WITHIN THE PAST 12 MONTHS, YOU WORRIED THAT YOUR FOOD WOULD RUN OUT BEFORE YOU GOT MONEY TO BUY MORE.: NEVER TRUE

## 2023-10-14 ASSESSMENT — LIFESTYLE VARIABLES
HOW OFTEN DO YOU HAVE A DRINK CONTAINING ALCOHOL: 5
HOW OFTEN DO YOU HAVE SIX OR MORE DRINKS ON ONE OCCASION: 1
HAVE YOU OR SOMEONE ELSE BEEN INJURED AS A RESULT OF YOUR DRINKING: NO
HOW OFTEN DURING THE LAST YEAR HAVE YOU FOUND THAT YOU WERE NOT ABLE TO STOP DRINKING ONCE YOU HAD STARTED: 0
HOW OFTEN DURING THE LAST YEAR HAVE YOU NEEDED AN ALCOHOLIC DRINK FIRST THING IN THE MORNING TO GET YOURSELF GOING AFTER A NIGHT OF HEAVY DRINKING: 0
HOW OFTEN DO YOU HAVE A DRINK CONTAINING ALCOHOL: 4 OR MORE TIMES A WEEK
HAS A RELATIVE, FRIEND, DOCTOR, OR ANOTHER HEALTH PROFESSIONAL EXPRESSED CONCERN ABOUT YOUR DRINKING OR SUGGESTED YOU CUT DOWN: NO
HOW OFTEN DURING THE LAST YEAR HAVE YOU FOUND THAT YOU WERE NOT ABLE TO STOP DRINKING ONCE YOU HAD STARTED: NEVER
HOW OFTEN DURING THE LAST YEAR HAVE YOU BEEN UNABLE TO REMEMBER WHAT HAPPENED THE NIGHT BEFORE BECAUSE YOU HAD BEEN DRINKING: NEVER
HOW OFTEN DURING THE LAST YEAR HAVE YOU FAILED TO DO WHAT WAS NORMALLY EXPECTED FROM YOU BECAUSE OF DRINKING: 0
HOW OFTEN DURING THE LAST YEAR HAVE YOU HAD A FEELING OF GUILT OR REMORSE AFTER DRINKING: 0
HOW OFTEN DURING THE LAST YEAR HAVE YOU HAD A FEELING OF GUILT OR REMORSE AFTER DRINKING: NEVER
HAS A RELATIVE, FRIEND, DOCTOR, OR ANOTHER HEALTH PROFESSIONAL EXPRESSED CONCERN ABOUT YOUR DRINKING OR SUGGESTED YOU CUT DOWN: 0
HOW OFTEN DURING THE LAST YEAR HAVE YOU BEEN UNABLE TO REMEMBER WHAT HAPPENED THE NIGHT BEFORE BECAUSE YOU HAD BEEN DRINKING: 0
HOW OFTEN DURING THE LAST YEAR HAVE YOU NEEDED AN ALCOHOLIC DRINK FIRST THING IN THE MORNING TO GET YOURSELF GOING AFTER A NIGHT OF HEAVY DRINKING: NEVER
HOW OFTEN DURING THE LAST YEAR HAVE YOU FAILED TO DO WHAT WAS NORMALLY EXPECTED FROM YOU BECAUSE OF DRINKING: NEVER
HOW MANY STANDARD DRINKS CONTAINING ALCOHOL DO YOU HAVE ON A TYPICAL DAY: 1
HOW MANY STANDARD DRINKS CONTAINING ALCOHOL DO YOU HAVE ON A TYPICAL DAY: 1 OR 2
HAVE YOU OR SOMEONE ELSE BEEN INJURED AS A RESULT OF YOUR DRINKING: 0

## 2023-10-14 ASSESSMENT — PATIENT HEALTH QUESTIONNAIRE - PHQ9
SUM OF ALL RESPONSES TO PHQ QUESTIONS 1-9: 0
1. LITTLE INTEREST OR PLEASURE IN DOING THINGS: 0
SUM OF ALL RESPONSES TO PHQ9 QUESTIONS 1 & 2: 0
SUM OF ALL RESPONSES TO PHQ QUESTIONS 1-9: 0
2. FEELING DOWN, DEPRESSED OR HOPELESS: 0

## 2023-10-17 ENCOUNTER — OFFICE VISIT (OUTPATIENT)
Age: 83
End: 2023-10-17
Payer: MEDICARE

## 2023-10-17 VITALS
BODY MASS INDEX: 28 KG/M2 | TEMPERATURE: 97.8 F | SYSTOLIC BLOOD PRESSURE: 117 MMHG | HEART RATE: 61 BPM | WEIGHT: 195.6 LBS | DIASTOLIC BLOOD PRESSURE: 71 MMHG | OXYGEN SATURATION: 98 % | RESPIRATION RATE: 16 BRPM | HEIGHT: 70 IN

## 2023-10-17 DIAGNOSIS — Z91.81 AT RISK FOR FALLS: ICD-10-CM

## 2023-10-17 DIAGNOSIS — I35.0 AORTIC VALVE STENOSIS, ETIOLOGY OF CARDIAC VALVE DISEASE UNSPECIFIED: ICD-10-CM

## 2023-10-17 DIAGNOSIS — Z79.01 ANTICOAGULATED ON COUMADIN: ICD-10-CM

## 2023-10-17 DIAGNOSIS — I48.11 LONGSTANDING PERSISTENT ATRIAL FIBRILLATION (HCC): ICD-10-CM

## 2023-10-17 DIAGNOSIS — M1A.00X0 IDIOPATHIC CHRONIC GOUT WITHOUT TOPHUS, UNSPECIFIED SITE: ICD-10-CM

## 2023-10-17 DIAGNOSIS — E78.00 HYPERCHOLESTEREMIA: ICD-10-CM

## 2023-10-17 DIAGNOSIS — Z00.00 MEDICARE ANNUAL WELLNESS VISIT, SUBSEQUENT: Primary | ICD-10-CM

## 2023-10-17 DIAGNOSIS — F41.9 ANXIETY: ICD-10-CM

## 2023-10-17 DIAGNOSIS — E11.9 TYPE 2 DIABETES MELLITUS WITHOUT COMPLICATION, WITHOUT LONG-TERM CURRENT USE OF INSULIN (HCC): ICD-10-CM

## 2023-10-17 LAB
ALBUMIN SERPL-MCNC: 3.6 G/DL (ref 3.5–5)
ALBUMIN/GLOB SERPL: 1.3 (ref 1.1–2.2)
ALP SERPL-CCNC: 72 U/L (ref 45–117)
ALT SERPL-CCNC: 22 U/L (ref 12–78)
ANION GAP SERPL CALC-SCNC: 3 MMOL/L (ref 5–15)
AST SERPL-CCNC: 16 U/L (ref 15–37)
BILIRUB SERPL-MCNC: 0.4 MG/DL (ref 0.2–1)
BUN SERPL-MCNC: 17 MG/DL (ref 6–20)
BUN/CREAT SERPL: 21 (ref 12–20)
CALCIUM SERPL-MCNC: 8.6 MG/DL (ref 8.5–10.1)
CHLORIDE SERPL-SCNC: 108 MMOL/L (ref 97–108)
CHOLEST SERPL-MCNC: 152 MG/DL
CO2 SERPL-SCNC: 28 MMOL/L (ref 21–32)
CREAT SERPL-MCNC: 0.81 MG/DL (ref 0.7–1.3)
ERYTHROCYTE [DISTWIDTH] IN BLOOD BY AUTOMATED COUNT: 13.4 % (ref 11.5–14.5)
GLOBULIN SER CALC-MCNC: 2.7 G/DL (ref 2–4)
GLUCOSE SERPL-MCNC: 129 MG/DL (ref 65–100)
HCT VFR BLD AUTO: 37.5 % (ref 36.6–50.3)
HDLC SERPL-MCNC: 48 MG/DL
HDLC SERPL: 3.2 (ref 0–5)
HGB BLD-MCNC: 11.8 G/DL (ref 12.1–17)
LDLC SERPL CALC-MCNC: 47 MG/DL (ref 0–100)
MCH RBC QN AUTO: 32.2 PG (ref 26–34)
MCHC RBC AUTO-ENTMCNC: 31.5 G/DL (ref 30–36.5)
MCV RBC AUTO: 102.2 FL (ref 80–99)
NRBC # BLD: 0 K/UL (ref 0–0.01)
NRBC BLD-RTO: 0 PER 100 WBC
PLATELET # BLD AUTO: 227 K/UL (ref 150–400)
PMV BLD AUTO: 10.1 FL (ref 8.9–12.9)
POTASSIUM SERPL-SCNC: 5.7 MMOL/L (ref 3.5–5.1)
PROT SERPL-MCNC: 6.3 G/DL (ref 6.4–8.2)
RBC # BLD AUTO: 3.67 M/UL (ref 4.1–5.7)
SODIUM SERPL-SCNC: 139 MMOL/L (ref 136–145)
TRIGL SERPL-MCNC: 285 MG/DL
URATE SERPL-MCNC: 6.9 MG/DL (ref 3.5–7.2)
VLDLC SERPL CALC-MCNC: 57 MG/DL
WBC # BLD AUTO: 5.8 K/UL (ref 4.1–11.1)

## 2023-10-17 PROCEDURE — 1036F TOBACCO NON-USER: CPT | Performed by: STUDENT IN AN ORGANIZED HEALTH CARE EDUCATION/TRAINING PROGRAM

## 2023-10-17 PROCEDURE — G8427 DOCREV CUR MEDS BY ELIG CLIN: HCPCS | Performed by: STUDENT IN AN ORGANIZED HEALTH CARE EDUCATION/TRAINING PROGRAM

## 2023-10-17 PROCEDURE — G8419 CALC BMI OUT NRM PARAM NOF/U: HCPCS | Performed by: STUDENT IN AN ORGANIZED HEALTH CARE EDUCATION/TRAINING PROGRAM

## 2023-10-17 PROCEDURE — 3044F HG A1C LEVEL LT 7.0%: CPT | Performed by: STUDENT IN AN ORGANIZED HEALTH CARE EDUCATION/TRAINING PROGRAM

## 2023-10-17 PROCEDURE — G0439 PPPS, SUBSEQ VISIT: HCPCS | Performed by: STUDENT IN AN ORGANIZED HEALTH CARE EDUCATION/TRAINING PROGRAM

## 2023-10-17 PROCEDURE — G8484 FLU IMMUNIZE NO ADMIN: HCPCS | Performed by: STUDENT IN AN ORGANIZED HEALTH CARE EDUCATION/TRAINING PROGRAM

## 2023-10-17 PROCEDURE — 99214 OFFICE O/P EST MOD 30 MIN: CPT | Performed by: STUDENT IN AN ORGANIZED HEALTH CARE EDUCATION/TRAINING PROGRAM

## 2023-10-17 PROCEDURE — 1123F ACP DISCUSS/DSCN MKR DOCD: CPT | Performed by: STUDENT IN AN ORGANIZED HEALTH CARE EDUCATION/TRAINING PROGRAM

## 2023-10-17 RX ORDER — TURMERIC/TURMERIC EXT/PEPR EXT 900-100 MG
1 CAPSULE ORAL DAILY
COMMUNITY

## 2023-10-17 RX ORDER — CALCIUM CARBONATE/VITAMIN D3 500MG-5MCG
TABLET ORAL
COMMUNITY

## 2023-10-17 RX ORDER — BLOOD-GLUCOSE METER
EACH MISCELLANEOUS
COMMUNITY
Start: 2021-06-25

## 2023-10-17 RX ORDER — NIACINAMIDE 500 MG
220 TABLET, EXTENDED RELEASE ORAL DAILY
COMMUNITY

## 2023-10-17 ASSESSMENT — PATIENT HEALTH QUESTIONNAIRE - PHQ9
SUM OF ALL RESPONSES TO PHQ QUESTIONS 1-9: 0
1. LITTLE INTEREST OR PLEASURE IN DOING THINGS: 0
SUM OF ALL RESPONSES TO PHQ QUESTIONS 1-9: 0
2. FEELING DOWN, DEPRESSED OR HOPELESS: 0
SUM OF ALL RESPONSES TO PHQ QUESTIONS 1-9: 0
SUM OF ALL RESPONSES TO PHQ QUESTIONS 1-9: 0
SUM OF ALL RESPONSES TO PHQ9 QUESTIONS 1 & 2: 0

## 2023-10-17 ASSESSMENT — LIFESTYLE VARIABLES
HOW MANY STANDARD DRINKS CONTAINING ALCOHOL DO YOU HAVE ON A TYPICAL DAY: 1 OR 2
HOW MANY STANDARD DRINKS CONTAINING ALCOHOL DO YOU HAVE ON A TYPICAL DAY: 5 OR 6
HOW OFTEN DO YOU HAVE A DRINK CONTAINING ALCOHOL: 4 OR MORE TIMES A WEEK
HOW OFTEN DO YOU HAVE A DRINK CONTAINING ALCOHOL: 4 OR MORE TIMES A WEEK

## 2023-10-17 NOTE — PATIENT INSTRUCTIONS
Followup with insurance regarding silver sneakers program specifically for balance training. Preventing Falls: Care Instructions    Talk to your doctor about the medicines you take. Ask if any of them increase the risk of falls and whether they can be changed or stopped. Try to exercise regularly. It can help improve your strength and balance. This can help lower your risk of falling. Practice fall safety and prevention. Wear low-heeled shoes that fit well and give your feet good support. Talk to your doctor if you have foot problems that make this hard. Carry a cellphone or wear a medical alert device that you can use to call for help. Use stepladders instead of chairs to reach high objects. Don't climb if you're at risk for falls. Ask for help, if needed. Wear the correct eyeglasses, if you need them. Make your home safer. Remove rugs, cords, clutter, and furniture from walkways. Keep your house well lit. Use night-lights in hallways and bathrooms. Install and use sturdy handrails on stairways. Wear nonskid footwear, even inside. Don't walk barefoot or in socks without shoes. Be safe outside. Use handrails, curb cuts, and ramps whenever possible. Keep your hands free by using a shoulder bag or backpack. Try to walk in well-lit areas. Watch out for uneven ground, changes in pavement, and debris. Be careful in the winter. Walk on the grass or gravel when sidewalks are slippery. Use de-icer on steps and walkways. Add non-slip devices to shoes. Put grab bars and nonskid mats in your shower or tub and near the toilet. Try to use a shower chair or bath bench when bathing. Get into a tub or shower by putting in your weaker leg first. Get out with your strong side first. Have a phone or medical alert device in the bathroom with you. Where can you learn more?   Go to http://www.wilson.com/ and enter G117 to learn more about \"Preventing Falls: Care

## 2023-10-17 NOTE — PROGRESS NOTES
Medicare Annual Wellness Visit    Anoop Jack is here for Medicare AWV    Assessment & Plan   Medicare annual wellness visit, subsequent  Updated medical, surgical, family and social history and completed or discussed all age, gender and comorbidity-related screenings and placed appropriate orders c/w this discussion. Anticoagulated on Coumadin  -     CBC; Future  Anxiety: stable on paxil, buspar  Type 2 diabetes mellitus without complication, without long-term current use of insulin ([address]): stable on metformin 500mg TID  -     Hemoglobin A1C; Future  Longstanding persistent atrial fibrillation ([address]): stable and followed by cardiology  - on coumadin, metoprolol for rate control  -     Comprehensive Metabolic Panel; Future  Hypercholesteremia: last LDL at goal  - continue atorvastatin 10mg   -     Lipid Panel; Future  Idiopathic chronic gout without tophus, unspecified site: stable without flare on allopurinol  - continue allopurinol 50g daily   -     Uric Acid; Future  Aortic valve stenosis, etiology of cardiac valve disease unspecified: review of last ECHO 8/2023 with mild sclerosis of AV. Audible murmur today so advised for increased SOB/BEGUM to followup with Dr Chato Porter for evaluation as concern for progression of AVS     Recommendations for Preventive Services Due: see orders and patient instructions/AVS.  Recommended screening schedule for the next 5-10 years is provided to the patient in written form: see Patient Instructions/AVS.     Return in about 6 months (around 4/17/2024) for chronic conditions. Subjective     P A fib s/p ablation, chronic DVT: followed by dr Meredith Nichols, on coumadin for chronic DVT as well. + murmur, more SOB/BEGUM with ambulation - has ECHO this year with Dr Chato Porter without significant change     DMII:  last a1c 6.6% on metformin 500mg TID with meals    Acoustic Neuroma: he last had visit with ENT (Dr. Nina Rob) in fall, 2022.  MRI ordered  on 11/8/22 by ENT, order no longer appears

## 2023-10-18 LAB
EST. AVERAGE GLUCOSE BLD GHB EST-MCNC: 148 MG/DL
HBA1C MFR BLD: 6.8 % (ref 4–5.6)

## 2023-10-30 DIAGNOSIS — E87.5 HYPERKALEMIA: Primary | ICD-10-CM

## 2023-11-03 ENCOUNTER — TELEPHONE (OUTPATIENT)
Age: 83
End: 2023-11-03

## 2023-11-03 NOTE — TELEPHONE ENCOUNTER
Called patient Due to the provider Vernell Becerra, the Nurse Practitioner not being available on that day. We have to reschedule your appointment for virtual on a different date and time. Patient will call back to schedule virtual appointment once he checks his schedule.

## 2023-11-07 NOTE — TELEPHONE ENCOUNTER
PCP: Beatrice Garcia MD    Last appt: 10/17/2023     Future Appointments   Date Time Provider Department Center   12/18/2023 11:40 AM Daniela Smith, APRN - NP [facility] BS AMB   4/17/2024  9:40 AM Beatrice Garcia MD [facility] BS AMB       Requested Prescriptions     Pending Prescriptions Disp Refills    sotalol (BETAPACE) 80 MG tablet [Pharmacy Med Name: SOTALOL 80 MG TABLET] 60 tablet 0     Sig: TAKE ONE TABLET BY MOUTH 2 TIMES A DAY       Prior labs and Blood pressures:  BP Readings from Last 3 Encounters:   10/17/23 117/71   05/03/23 102/70   02/27/23 100/62     Lab Results   Component Value Date/Time     10/17/2023 11:01 AM    K 5.7 10/17/2023 11:01 AM     10/17/2023 11:01 AM    CO2 28 10/17/2023 11:01 AM    BUN 17 10/17/2023 11:01 AM    GFRAA >60 09/25/2022 02:56 AM     No results found for: \"HBA1C\", \"[id number]\"  Lab Results   Component Value Date/Time    CHOL 152 10/17/2023 11:01 AM    HDL 48 10/17/2023 11:01 AM     No results found for: \"VITD3\", \"VD3RIA\"    Lab Results   Component Value Date/Time    TSH 2.91 03/16/2023 09:11 AM

## 2023-11-08 RX ORDER — SOTALOL HYDROCHLORIDE 80 MG/1
80 TABLET ORAL 2 TIMES DAILY
Qty: 180 TABLET | Refills: 1 | Status: SHIPPED | OUTPATIENT
Start: 2023-11-08 | End: 2023-12-12

## 2023-11-27 ENCOUNTER — TELEPHONE (OUTPATIENT)
Age: 83
End: 2023-11-27

## 2023-11-27 DIAGNOSIS — F41.9 ANXIETY: Primary | ICD-10-CM

## 2023-11-27 RX ORDER — PAROXETINE HYDROCHLORIDE 20 MG/1
20 TABLET, FILM COATED ORAL 2 TIMES DAILY
Qty: 180 TABLET | Refills: 0 | Status: SHIPPED | OUTPATIENT
Start: 2023-11-27 | End: 2023-11-29 | Stop reason: SDUPTHER

## 2023-11-27 RX ORDER — PAROXETINE HYDROCHLORIDE 20 MG/1
TABLET, FILM COATED ORAL
Qty: 180 TABLET | Refills: 0 | OUTPATIENT
Start: 2023-11-27

## 2023-11-27 NOTE — TELEPHONE ENCOUNTER
----- Message from Getachew Lujan sent at 11/27/2023  9:02 AM EST -----  Regarding: Refill for Paroxetine HCL 20 mg  Contact: [phone]  Long Burkett--    Can you refill my prescription for Paroxetine HCL 20 mg (180 tablets) today at [facility]? Thank you for your help!     Best regards, Getachew Lujan

## 2023-11-29 ENCOUNTER — TELEPHONE (OUTPATIENT)
Age: 83
End: 2023-11-29

## 2023-11-29 DIAGNOSIS — F41.9 ANXIETY: ICD-10-CM

## 2023-11-29 RX ORDER — PAROXETINE HYDROCHLORIDE 20 MG/1
20 TABLET, FILM COATED ORAL 2 TIMES DAILY
Qty: 180 TABLET | Refills: 0 | Status: SHIPPED | OUTPATIENT
Start: 2023-11-29

## 2023-11-29 NOTE — TELEPHONE ENCOUNTER
Norman Mckenzie from [employer] called stating that the pharmacy we sent the Paroxetine to is not closed, and was hoping to send it to their pharmacy. The address for their pharmacy is [facility].     Best call back number  [phone]

## 2023-12-01 DIAGNOSIS — F41.9 ANXIETY: ICD-10-CM

## 2023-12-01 RX ORDER — PAROXETINE HYDROCHLORIDE 20 MG/1
20 TABLET, FILM COATED ORAL 2 TIMES DAILY
Qty: 180 TABLET | Refills: 0 | OUTPATIENT
Start: 2023-12-01

## 2023-12-11 NOTE — TELEPHONE ENCOUNTER
PCP: Aparna Collins MD    Last appt: 10/17/2023     Future Appointments   Date Time Provider [address]   12/13/2023  9:15 AM [facility] MRI 1 [facility]   12/18/2023 11:40 AM JAGUAR Rogel - NP [facility] BS AMB   4/17/2024  9:40 AM Aparna Collins MD [facility] BS AMB       Requested Prescriptions     Pending Prescriptions Disp Refills    sotalol (BETAPACE) 80 MG tablet [Pharmacy Med Name: SOTALOL 80 MG TABLET] 60 tablet 0     Sig: TAKE ONE TABLET BY MOUTH 2 TIMES A DAY       Prior labs and Blood pressures:  BP Readings from Last 3 Encounters:   10/17/23 117/71   05/03/23 102/70   02/27/23 100/62     Lab Results   Component Value Date/Time     10/17/2023 11:01 AM    K 5.7 10/17/2023 11:01 AM     10/17/2023 11:01 AM    CO2 28 10/17/2023 11:01 AM    BUN 17 10/17/2023 11:01 AM    GFRAA >60 09/25/2022 02:56 AM     No results found for: \"HBA1C\", \"[id number]\"  Lab Results   Component Value Date/Time    CHOL 152 10/17/2023 11:01 AM    HDL 48 10/17/2023 11:01 AM     No results found for: \"VITD3\", \"VD3RIA\"    Lab Results   Component Value Date/Time    TSH 2.91 03/16/2023 09:11 AM

## 2023-12-12 RX ORDER — SOTALOL HYDROCHLORIDE 80 MG/1
80 TABLET ORAL 2 TIMES DAILY
Qty: 180 TABLET | Refills: 0 | Status: SHIPPED | OUTPATIENT
Start: 2023-12-12

## 2023-12-13 ENCOUNTER — HOSPITAL ENCOUNTER (OUTPATIENT)
Facility: HOSPITAL | Age: 83
Discharge: HOME | End: 2023-12-16
Attending: SPECIALIST
Payer: MEDICARE

## 2023-12-13 DIAGNOSIS — D33.3: ICD-10-CM

## 2023-12-13 DIAGNOSIS — H83.2X1 LABYRINTHINE DYSFUNCTION OF RIGHT EAR: ICD-10-CM

## 2023-12-13 DIAGNOSIS — H90.3 SENSORINEURAL HEARING LOSS, BILATERAL: ICD-10-CM

## 2023-12-13 PROCEDURE — 70553 MRI BRAIN STEM W/O & W/DYE: CPT

## 2023-12-13 PROCEDURE — 6360000004 HC RX CONTRAST MEDICATION: Performed by: SPECIALIST

## 2023-12-13 PROCEDURE — A9579 GAD-BASE MR CONTRAST NOS,1ML: HCPCS | Performed by: SPECIALIST

## 2023-12-13 RX ADMIN — GADOTERIDOL 17 ML: 279.3 INJECTION, SOLUTION INTRAVENOUS at 10:19

## 2024-01-11 RX ORDER — ALLOPURINOL 100 MG/1
50 TABLET ORAL DAILY
Qty: 60 TABLET | Refills: 0 | Status: SHIPPED | OUTPATIENT
Start: 2024-01-11

## 2024-01-11 NOTE — TELEPHONE ENCOUNTER
PCP: Beatrice Garcia MD    Last appt: 10/17/2023     Future Appointments   Date Time Provider Department Center   2/26/2024  3:00 PM Milan Smith MD [facility] BS AMB   4/17/2024  9:40 AM Beatrice Garcia MD [facility] BS AMB       Requested Prescriptions     Pending Prescriptions Disp Refills    allopurinol (ZYLOPRIM) 100 MG tablet [Pharmacy Med Name: ALLOPURINOL 100 MG TABLET] 30 tablet 0     Sig: TAKE 1/2 TABLET BY MOUTH EVERY DAY       Prior labs and Blood pressures:  BP Readings from Last 3 Encounters:   10/17/23 117/71   05/03/23 102/70   02/27/23 100/62     Lab Results   Component Value Date/Time     10/17/2023 11:01 AM    K 5.7 10/17/2023 11:01 AM     10/17/2023 11:01 AM    CO2 28 10/17/2023 11:01 AM    BUN 17 10/17/2023 11:01 AM    GFRAA >60 09/25/2022 02:56 AM     No results found for: \"HBA1C\", \"[id number]\"  Lab Results   Component Value Date/Time    CHOL 152 10/17/2023 11:01 AM    HDL 48 10/17/2023 11:01 AM     No results found for: \"VITD3\", \"VD3RIA\"    Lab Results   Component Value Date/Time    TSH 2.91 03/16/2023 09:11 AM

## 2024-02-13 NOTE — TELEPHONE ENCOUNTER
Last appointment: 10/17/23 Radha  Next appointment: 4/17/24 (noted as LVM of provider no longer at this facility on that date)  Previous refill encounter(s): 2/9/23 180 + 1 and 5/3/23 180 (last refill 11/7/23 per fax)    Requested Prescriptions     Pending Prescriptions Disp Refills    busPIRone (BUSPAR) 5 MG tablet 180 tablet 1     Sig: Take 1 tablet by mouth 2 times daily     For Pharmacy Admin Tracking Only    Program: Medication Refill  CPA in place:    Recommendation Provided To:   Intervention Detail: New Rx: 1, reason: Patient Preference  Intervention Accepted By:   Gap Closed?:    Time Spent (min): 5

## 2024-02-14 RX ORDER — BUSPIRONE HYDROCHLORIDE 5 MG/1
5 TABLET ORAL 2 TIMES DAILY
Qty: 180 TABLET | Refills: 1 | Status: SHIPPED | OUTPATIENT
Start: 2024-02-14

## (undated) DEVICE — DRAPE,U/ SHT,SPLIT,PLAS,STERIL: Brand: MEDLINE

## (undated) DEVICE — TOTAL TRAY, 16FR 10ML SIL FOLEY, URN: Brand: MEDLINE

## (undated) DEVICE — MARKER,SKIN,WI/RULER AND LABELS: Brand: MEDLINE

## (undated) DEVICE — SUTURE STRATAFIX SPRL SZ 1 L14IN ABSRB VLT L48CM CTX 1/2 SXPD2B405

## (undated) DEVICE — SCRUB DRY SURG EZ SCRUB BRUSH PREOPERATIVE GRN

## (undated) DEVICE — 3M™ STERI-DRAPE™ 2 INCISE DRAPE 2050: Brand: STERI-DRAPE™

## (undated) DEVICE — STERILE POLYISOPRENE POWDER-FREE SURGICAL GLOVES WITH EMOLLIENT COATING: Brand: PROTEXIS

## (undated) DEVICE — Device

## (undated) DEVICE — PATIENT PROTECTIVE PAD FOR IMP UNIVERSAL LATERAL HIP POSITIONER (ULP) (6/CASE): Brand: PATIENT PROTECTIVE PAD

## (undated) DEVICE — ELECTRODE BLDE L4IN NONINSULATED EDGE

## (undated) DEVICE — SPONGE GZ W4XL4IN COT 12 PLY TYP VII WVN C FLD DSGN

## (undated) DEVICE — SYR 20ML LL STRL LF --

## (undated) DEVICE — GOWN,PREVENTION PLUS,XLN/2XL,ST,22/CS: Brand: MEDLINE

## (undated) DEVICE — DECANTER BAG 9": Brand: MEDLINE INDUSTRIES, INC.

## (undated) DEVICE — SOL IRR STRL H2O 1000ML BTL --

## (undated) DEVICE — HANDPIECE SET WITH BONE CLEANING TIP AND SUCTION TUBE: Brand: INTERPULSE

## (undated) DEVICE — DRAPE,REIN 53X77,STERILE: Brand: MEDLINE

## (undated) DEVICE — T4 HOOD

## (undated) DEVICE — 3M™ IOBAN™ 2 ANTIMICROBIAL INCISE DRAPE 6651EZ: Brand: IOBAN™ 2

## (undated) DEVICE — SUTURE VCRL SZ 2-0 L36IN ABSRB UD L40MM CT 1/2 CIR J957H

## (undated) DEVICE — DRESSING HYDROCOLLOID BORDER 35X10 IN ALUM PRIMASEAL

## (undated) DEVICE — PAD BD MATTRESS 73X32 IN STD CONVOLUTED FOAM LTX FREE

## (undated) DEVICE — STERILE POLYISOPRENE POWDER-FREE SURGICAL GLOVES: Brand: PROTEXIS

## (undated) DEVICE — BLADE SAW W098XL354IN THK0047IN CUT THK0047IN SAG

## (undated) DEVICE — SOL IRR SOD CL 0.9% 3000ML --

## (undated) DEVICE — CONTAINER,SPECIMEN,3OZ,OR STRL: Brand: MEDLINE

## (undated) DEVICE — YANKAUER,OPEN TIP,W/O VENT,STERILE: Brand: MEDLINE INDUSTRIES, INC.

## (undated) DEVICE — NEEDLE HYPO 21GA L1.5IN INTRAMUSCULAR S STL LATCH BVL UP

## (undated) DEVICE — PREP SKN CHLRAPRP APL 26ML STR --

## (undated) DEVICE — REM POLYHESIVE ADULT PATIENT RETURN ELECTRODE: Brand: VALLEYLAB

## (undated) DEVICE — TIP SUCT CRV REG REDI

## (undated) DEVICE — SUTURE ETHBND EXCEL SZ 2 L30IN NONABSORBABLE GRN L40MM V-37 MX69G

## (undated) DEVICE — ALCOHOL RUBBING ISO 16OZ 70%

## (undated) DEVICE — BIT DRL L5IN DIA2MM STD ST S STL TWST BUSA

## (undated) DEVICE — COVER,MAYO STAND,STERILE: Brand: MEDLINE

## (undated) DEVICE — SOLUTION IV 50ML 0.9% SOD CHL

## (undated) DEVICE — DERMABOND SKIN ADH 0.7ML -- DERMABOND ADVANCED 12/BX

## (undated) DEVICE — SUTURE VCRL SZ 1 L36IN ABSRB UD L36MM CT-1 1/2 CIR J947H

## (undated) DEVICE — SUTURE MCRYL SZ 3-0 L27IN ABSRB UD L24MM PS-1 3/8 CIR PRIM Y936H

## (undated) DEVICE — SOLUTION SURG PREP 26 CC PURPREP